# Patient Record
Sex: FEMALE | Race: WHITE | NOT HISPANIC OR LATINO | Employment: OTHER | URBAN - METROPOLITAN AREA
[De-identification: names, ages, dates, MRNs, and addresses within clinical notes are randomized per-mention and may not be internally consistent; named-entity substitution may affect disease eponyms.]

---

## 2017-01-10 DIAGNOSIS — M05.731 RHEUMATOID ARTHRITIS INVOLVING BOTH WRISTS WITH POSITIVE RHEUMATOID FACTOR: ICD-10-CM

## 2017-01-10 DIAGNOSIS — M05.732 RHEUMATOID ARTHRITIS INVOLVING BOTH WRISTS WITH POSITIVE RHEUMATOID FACTOR: ICD-10-CM

## 2017-01-10 RX ORDER — METHOTREXATE 2.5 MG/1
TABLET ORAL
Qty: 20 TABLET | Refills: 0 | Status: SHIPPED | OUTPATIENT
Start: 2017-01-10 | End: 2017-01-17

## 2017-01-16 ENCOUNTER — NURSE TRIAGE (OUTPATIENT)
Dept: ADMINISTRATIVE | Facility: CLINIC | Age: 72
End: 2017-01-16

## 2017-01-16 NOTE — TELEPHONE ENCOUNTER
Reason for Disposition   General information question, no triage required and triager able to answer question    Protocols used: ST INFORMATION ONLY CALL-A-AH    Patient has an appointment for lab work and with Dr. Garza but now patient has flu like symptoms.  Patient wants to cancel lab work and appointment, maybe reschedule.

## 2017-01-17 ENCOUNTER — OFFICE VISIT (OUTPATIENT)
Dept: RHEUMATOLOGY | Facility: CLINIC | Age: 72
End: 2017-01-17
Payer: MEDICARE

## 2017-01-17 ENCOUNTER — TELEPHONE (OUTPATIENT)
Dept: RHEUMATOLOGY | Facility: CLINIC | Age: 72
End: 2017-01-17

## 2017-01-17 ENCOUNTER — LAB VISIT (OUTPATIENT)
Dept: LAB | Facility: HOSPITAL | Age: 72
End: 2017-01-17
Attending: INTERNAL MEDICINE
Payer: MEDICARE

## 2017-01-17 VITALS
DIASTOLIC BLOOD PRESSURE: 69 MMHG | BODY MASS INDEX: 17.42 KG/M2 | HEART RATE: 90 BPM | WEIGHT: 98.31 LBS | SYSTOLIC BLOOD PRESSURE: 124 MMHG | HEIGHT: 63 IN

## 2017-01-17 DIAGNOSIS — M05.732 RHEUMATOID ARTHRITIS INVOLVING BOTH WRISTS WITH POSITIVE RHEUMATOID FACTOR: Primary | ICD-10-CM

## 2017-01-17 DIAGNOSIS — M05.731 RHEUMATOID ARTHRITIS INVOLVING BOTH WRISTS WITH POSITIVE RHEUMATOID FACTOR: Primary | ICD-10-CM

## 2017-01-17 DIAGNOSIS — M81.0 OSTEOPOROSIS: ICD-10-CM

## 2017-01-17 DIAGNOSIS — M05.732 RHEUMATOID ARTHRITIS INVOLVING BOTH WRISTS WITH POSITIVE RHEUMATOID FACTOR: ICD-10-CM

## 2017-01-17 DIAGNOSIS — M05.731 RHEUMATOID ARTHRITIS INVOLVING BOTH WRISTS WITH POSITIVE RHEUMATOID FACTOR: ICD-10-CM

## 2017-01-17 LAB
ALBUMIN SERPL BCP-MCNC: 3.1 G/DL
ALP SERPL-CCNC: 126 U/L
ALT SERPL W/O P-5'-P-CCNC: 11 U/L
ANION GAP SERPL CALC-SCNC: 8 MMOL/L
AST SERPL-CCNC: 42 U/L
BASOPHILS # BLD AUTO: 0.1 K/UL
BASOPHILS NFR BLD: 0.9 %
BILIRUB SERPL-MCNC: 0.3 MG/DL
BUN SERPL-MCNC: 11 MG/DL
CALCIUM SERPL-MCNC: 9.7 MG/DL
CHLORIDE SERPL-SCNC: 104 MMOL/L
CO2 SERPL-SCNC: 25 MMOL/L
CREAT SERPL-MCNC: 0.9 MG/DL
CRP SERPL-MCNC: 20.7 MG/L
DIFFERENTIAL METHOD: ABNORMAL
EOSINOPHIL # BLD AUTO: 0 K/UL
EOSINOPHIL NFR BLD: 0.5 %
ERYTHROCYTE [DISTWIDTH] IN BLOOD BY AUTOMATED COUNT: 14 %
ERYTHROCYTE [SEDIMENTATION RATE] IN BLOOD BY WESTERGREN METHOD: 85 MM/HR
EST. GFR  (AFRICAN AMERICAN): >60 ML/MIN/1.73 M^2
EST. GFR  (NON AFRICAN AMERICAN): >60 ML/MIN/1.73 M^2
GLUCOSE SERPL-MCNC: 118 MG/DL
HCT VFR BLD AUTO: 34.4 %
HGB BLD-MCNC: 11.4 G/DL
LYMPHOCYTES # BLD AUTO: 2 K/UL
LYMPHOCYTES NFR BLD: 29.7 %
MCH RBC QN AUTO: 31.2 PG
MCHC RBC AUTO-ENTMCNC: 33.1 %
MCV RBC AUTO: 94 FL
MONOCYTES # BLD AUTO: 0.8 K/UL
MONOCYTES NFR BLD: 12.1 %
NEUTROPHILS # BLD AUTO: 3.8 K/UL
NEUTROPHILS NFR BLD: 56.8 %
PLATELET # BLD AUTO: 412 K/UL
PMV BLD AUTO: 8.1 FL
POTASSIUM SERPL-SCNC: 4.6 MMOL/L
PROT SERPL-MCNC: 7 G/DL
RBC # BLD AUTO: 3.65 M/UL
SODIUM SERPL-SCNC: 137 MMOL/L
WBC # BLD AUTO: 6.7 K/UL

## 2017-01-17 PROCEDURE — 1157F ADVNC CARE PLAN IN RCRD: CPT | Mod: S$GLB,,, | Performed by: INTERNAL MEDICINE

## 2017-01-17 PROCEDURE — 99213 OFFICE O/P EST LOW 20 MIN: CPT | Mod: S$GLB,,, | Performed by: INTERNAL MEDICINE

## 2017-01-17 PROCEDURE — 36415 COLL VENOUS BLD VENIPUNCTURE: CPT

## 2017-01-17 PROCEDURE — 80053 COMPREHEN METABOLIC PANEL: CPT

## 2017-01-17 PROCEDURE — 85025 COMPLETE CBC W/AUTO DIFF WBC: CPT

## 2017-01-17 PROCEDURE — 86140 C-REACTIVE PROTEIN: CPT

## 2017-01-17 PROCEDURE — 99999 PR PBB SHADOW E&M-EST. PATIENT-LVL III: CPT | Mod: PBBFAC,,, | Performed by: INTERNAL MEDICINE

## 2017-01-17 PROCEDURE — 85651 RBC SED RATE NONAUTOMATED: CPT

## 2017-01-17 PROCEDURE — 1159F MED LIST DOCD IN RCRD: CPT | Mod: S$GLB,,, | Performed by: INTERNAL MEDICINE

## 2017-01-17 PROCEDURE — 1160F RVW MEDS BY RX/DR IN RCRD: CPT | Mod: S$GLB,,, | Performed by: INTERNAL MEDICINE

## 2017-01-17 RX ORDER — HYDROCODONE BITARTRATE AND ACETAMINOPHEN 5; 325 MG/1; MG/1
TABLET ORAL
Refills: 0 | COMMUNITY
Start: 2016-12-30 | End: 2017-01-27

## 2017-01-17 NOTE — TELEPHONE ENCOUNTER
I received a message that the patient wanted to cancel her follow up appt with Dr. Garza and her lab appointment. I attempted to call the pt to reschedule, but I did not get an answer nor was able to leave a voice message.

## 2017-01-17 NOTE — PROGRESS NOTES
"Subjective:       Patient ID: Aurelia Massey is a 71 y.o. female.    Chief Complaint: Seropositive rheumatoid arthritis  HPI 70 yo female with h/o Osteoporosis is here for follow-up for seropositive rheumatoid arthritis.  Currently on methotrexate 10 mg a week.  Since the last visit, she has been diagnosed with multiple hepatic masses -S/P biopsy -pending results of this time malignancy is being suspected.  Today, she doesn't have any pain in hands but does have mild pain in left shoulder.  No joint swelling.  Denies any chest pain, shortness of breath, palpitations.   Sister has RA  Review of Systems   Constitutional: Negative for fatigue and fever.   Eyes: Negative for pain and redness.   Respiratory: Negative for cough and shortness of breath.    Cardiovascular: Negative for chest pain and palpitations.   Gastrointestinal: Negative for abdominal distention and abdominal pain.   Musculoskeletal: Positive for arthralgias.   Neurological: Negative for tremors and seizures.   Psychiatric/Behavioral: Negative for agitation and hallucinations.         Objective:     Visit Vitals    /69 (BP Location: Left arm, Patient Position: Sitting, BP Method: Automatic)    Pulse 90    Ht 5' 3" (1.6 m)    Wt 44.6 kg (98 lb 5.2 oz)    BMI 17.42 kg/m2        Physical Exam   Constitutional: She is oriented to person, place, and time and well-developed, well-nourished, and in no distress.   HENT:   Head: Normocephalic and atraumatic.   Eyes: Conjunctivae and EOM are normal. Pupils are equal, round, and reactive to light.   Neck: Neck supple. No tracheal deviation present. No thyromegaly present.   Cardiovascular: Normal rate and regular rhythm.    Pulmonary/Chest: Effort normal and breath sounds normal.   Abdominal: Soft. Bowel sounds are normal.       Right Side Rheumatological Exam     Examination finds the shoulder, elbow, wrist, knee, 1st PIP, 1st MCP, 2nd PIP, 2nd MCP, 3rd PIP, 3rd MCP, 4th PIP, 4th MCP, 5th PIP " and 5th MCP normal.    Left Side Rheumatological Exam     Examination finds the elbow, wrist, knee, 1st PIP, 1st MCP, 2nd PIP, 2nd MCP, 3rd PIP, 3rd MCP, 4th PIP, 4th MCP, 5th PIP and 5th MCP normal.    The patient is tender to palpation of the shoulder.      Neurological: She is alert and oriented to person, place, and time.   Skin: Skin is warm and dry.     Psychiatric: Memory and affect normal.   Musculoskeletal: She exhibits no edema.                   Lab Results   Component Value Date    WBC 6.70 01/17/2017    HGB 11.4 (L) 01/17/2017    HCT 34.4 (L) 01/17/2017    MCV 94 01/17/2017     (H) 01/17/2017     CMP  Sodium   Date Value Ref Range Status   01/17/2017 137 136 - 145 mmol/L Final     Potassium   Date Value Ref Range Status   01/17/2017 4.6 3.5 - 5.1 mmol/L Final     Chloride   Date Value Ref Range Status   01/17/2017 104 95 - 110 mmol/L Final     CO2   Date Value Ref Range Status   01/17/2017 25 23 - 29 mmol/L Final     Glucose   Date Value Ref Range Status   01/17/2017 118 (H) 70 - 110 mg/dL Final     BUN, Bld   Date Value Ref Range Status   01/17/2017 11 8 - 23 mg/dL Final     Creatinine   Date Value Ref Range Status   01/17/2017 0.9 0.5 - 1.4 mg/dL Final     Calcium   Date Value Ref Range Status   01/17/2017 9.7 8.7 - 10.5 mg/dL Final     Total Protein   Date Value Ref Range Status   01/17/2017 7.0 6.0 - 8.4 g/dL Final     Albumin   Date Value Ref Range Status   01/17/2017 3.1 (L) 3.5 - 5.2 g/dL Final     Total Bilirubin   Date Value Ref Range Status   01/17/2017 0.3 0.1 - 1.0 mg/dL Final     Comment:     For infants and newborns, interpretation of results should be based  on gestational age, weight and in agreement with clinical  observations.  Premature Infant recommended reference ranges:  Up to 24 hours.............<8.0 mg/dL  Up to 48 hours............<12.0 mg/dL  3-5 days..................<15.0 mg/dL  6-29 days.................<15.0 mg/dL       Alkaline Phosphatase   Date Value Ref Range  Status   01/17/2017 126 55 - 135 U/L Final     AST   Date Value Ref Range Status   01/17/2017 42 (H) 10 - 40 U/L Final     ALT   Date Value Ref Range Status   01/17/2017 11 10 - 44 U/L Final     Anion Gap   Date Value Ref Range Status   01/17/2017 8 8 - 16 mmol/L Final     eGFR if    Date Value Ref Range Status   01/17/2017 >60 >60 mL/min/1.73 m^2 Final     eGFR if non    Date Value Ref Range Status   01/17/2017 >60 >60 mL/min/1.73 m^2 Final     Comment:     Calculation used to obtain the estimated glomerular filtration  rate (eGFR) is the CKD-EPI equation. Since race is unknown   in our information system, the eGFR values for   -American and Non--American patients are given   for each creatinine result.       Lab Results   Component Value Date    SEDRATE 85 (H) 01/17/2017     Lab Results   Component Value Date    CRP 20.7 (H) 01/17/2017   Results for JOSE ROLDAN (MRN [id number]) as of 12/13/2016 10:42   Ref. Range 10/31/2016 11:43   Anti-SSA Antibody Latest Ref Range: 0.00 - 19.99 EU 1.62   Anti-SSA Interpretation Latest Ref Range: Negative  Negative   CCP Antibodies Latest Ref Range: <5.0 U/mL 1084.6 (H)      Reviewed outside results from 9/1/2016  RF 54 ESR 38 KRISTIAN negative  Assessment:   Seropositive rheumatoid arthritis- TJC 1 SJC 0  Multiple hepatic and jd hepatic masses -S/P biopsy-pending results  Osteoporosis  ITP s/p splenectomy  GERD  Plan:   Hold methotrexate for now  Continue Norco for pain  On Fosamax for osteoporosis  Follow-up with oncology for further evaluation  Call if any worsening of joint pain or swelling  Return to clinic in 3 months

## 2017-01-17 NOTE — MR AVS SNAPSHOT
"    [facility]   [address]  [place] [address]  Phone: [phone]  Fax: [phone]                  Aurelia Massey   2017 1:00 PM   Office Visit    Description:  Female : 1945   Provider:  Melanie Garza MD   Department:  [facility]           Reason for Visit     Follow-up                To Do List           Goals (5 Years of Data)     None      [facility]     [facility] On Call Nurse Care Line -  Assistance  Registered nurses in the [facility] On Call Center provide clinical advisement, health education, appointment booking, and other advisory services.  Call for this free service at [phone].             Medications           Message regarding Medications     Verify the changes and/or additions to your medication regime listed below are the same as discussed with your clinician today.  If any of these changes or additions are incorrect, please notify your healthcare provider.             Verify that the below list of medications is an accurate representation of the medications you are currently taking.  If none reported, the list may be blank. If incorrect, please contact your healthcare provider. Carry this list with you in case of emergency.           Current Medications     alendronate (FOSAMAX) 70 MG tablet TAKE 1 TABLET ONCE WEEKLY    folic acid (FOLVITE) 1 MG tablet Take 1 tablet (1 mg total) by mouth once daily.    hydrocodone-acetaminophen 5-325mg (NORCO) 5-325 mg per tablet TAKE 1 TABLET BY MOUTH EVERY 6 HOURS AS NEEDED FOR SEVERE PAIN    methotrexate 2.5 MG Tab TAKE 4 TABLETS (10 MG TOTAL) BY MOUTH EVERY 7 DAYS.           Clinical Reference Information           Vital Signs - Last Recorded  Most recent update: 2017  2:34 PM by [place] Montana LPN    BP Pulse Ht Wt BMI    124/69 (BP Location: Left arm, Patient Position: Sitting, BP Method: Automatic) 90 5' 3" (1.6 m) 44.6 kg (98 lb 5.2 oz) 17.42 kg/m2      Blood Pressure       "    Most Recent Value    BP  124/69      Allergies as of 1/17/2017     Plaquenil [Hydroxychloroquine]      Immunizations Administered on Date of Encounter - 1/17/2017     None

## 2017-01-26 ENCOUNTER — HISTORICAL (OUTPATIENT)
Dept: ADMINISTRATIVE | Facility: HOSPITAL | Age: 72
End: 2017-01-26

## 2017-01-27 ENCOUNTER — HOSPITAL ENCOUNTER (EMERGENCY)
Facility: HOSPITAL | Age: 72
Discharge: HOME | End: 2017-01-27
Attending: EMERGENCY MEDICINE
Payer: MEDICARE

## 2017-01-27 VITALS
HEART RATE: 93 BPM | HEIGHT: 63 IN | WEIGHT: 102 LBS | RESPIRATION RATE: 18 BRPM | SYSTOLIC BLOOD PRESSURE: 124 MMHG | DIASTOLIC BLOOD PRESSURE: 66 MMHG | BODY MASS INDEX: 18.07 KG/M2 | OXYGEN SATURATION: 99 % | TEMPERATURE: 98 F

## 2017-01-27 DIAGNOSIS — C80.1 METASTATIC CARCINOMA INVOLVING LIVER WITH UNKNOWN PRIMARY SITE: Primary | ICD-10-CM

## 2017-01-27 DIAGNOSIS — R10.84 DIFFUSE ABDOMINAL PAIN: ICD-10-CM

## 2017-01-27 DIAGNOSIS — C78.7 METASTATIC CARCINOMA INVOLVING LIVER WITH UNKNOWN PRIMARY SITE: Primary | ICD-10-CM

## 2017-01-27 LAB
ALBUMIN SERPL BCP-MCNC: 3.1 G/DL
ALP SERPL-CCNC: 135 U/L
ALT SERPL W/O P-5'-P-CCNC: 15 U/L
ANION GAP SERPL CALC-SCNC: 11 MMOL/L
AST SERPL-CCNC: 51 U/L
BILIRUB SERPL-MCNC: 0.6 MG/DL
BILIRUB UR QL STRIP: ABNORMAL
BUN SERPL-MCNC: 11 MG/DL
CALCIUM SERPL-MCNC: 9.7 MG/DL
CHLORIDE SERPL-SCNC: 104 MMOL/L
CLARITY UR: ABNORMAL
CO2 SERPL-SCNC: 21 MMOL/L
COLOR UR: YELLOW
CREAT SERPL-MCNC: 0.8 MG/DL
EST. GFR  (AFRICAN AMERICAN): >60 ML/MIN/1.73 M^2
EST. GFR  (NON AFRICAN AMERICAN): >60 ML/MIN/1.73 M^2
GLUCOSE SERPL-MCNC: 83 MG/DL
GLUCOSE UR QL STRIP: NEGATIVE
HGB UR QL STRIP: NEGATIVE
KETONES UR QL STRIP: ABNORMAL
LACTATE SERPL-SCNC: 2 MMOL/L
LEUKOCYTE ESTERASE UR QL STRIP: NEGATIVE
LIPASE SERPL-CCNC: 39 U/L
NITRITE UR QL STRIP: NEGATIVE
PH UR STRIP: 7 [PH] (ref 5–8)
POTASSIUM SERPL-SCNC: 4.2 MMOL/L
PROT SERPL-MCNC: 7 G/DL
PROT UR QL STRIP: NEGATIVE
SODIUM SERPL-SCNC: 136 MMOL/L
SP GR UR STRIP: 1.02 (ref 1–1.03)
URN SPEC COLLECT METH UR: ABNORMAL
UROBILINOGEN UR STRIP-ACNC: 1 EU/DL

## 2017-01-27 PROCEDURE — 81003 URINALYSIS AUTO W/O SCOPE: CPT

## 2017-01-27 PROCEDURE — 25000003 PHARM REV CODE 250: Performed by: EMERGENCY MEDICINE

## 2017-01-27 PROCEDURE — 25500020 PHARM REV CODE 255

## 2017-01-27 PROCEDURE — 83605 ASSAY OF LACTIC ACID: CPT

## 2017-01-27 PROCEDURE — 36415 COLL VENOUS BLD VENIPUNCTURE: CPT

## 2017-01-27 PROCEDURE — 83690 ASSAY OF LIPASE: CPT

## 2017-01-27 PROCEDURE — 96374 THER/PROPH/DIAG INJ IV PUSH: CPT

## 2017-01-27 PROCEDURE — 80053 COMPREHEN METABOLIC PANEL: CPT

## 2017-01-27 PROCEDURE — 63600175 PHARM REV CODE 636 W HCPCS: Performed by: EMERGENCY MEDICINE

## 2017-01-27 PROCEDURE — 99284 EMERGENCY DEPT VISIT MOD MDM: CPT | Mod: 25

## 2017-01-27 RX ORDER — FENTANYL 25 UG/1
1 PATCH TRANSDERMAL
Status: DISCONTINUED | OUTPATIENT
Start: 2017-01-27 | End: 2017-01-27 | Stop reason: HOSPADM

## 2017-01-27 RX ORDER — OXYCODONE AND ACETAMINOPHEN 5; 325 MG/1; MG/1
1-2 TABLET ORAL EVERY 4 HOURS PRN
Qty: 30 TABLET | Refills: 0 | Status: SHIPPED | OUTPATIENT
Start: 2017-01-27 | End: 2017-07-21

## 2017-01-27 RX ORDER — MORPHINE SULFATE 10 MG/ML
5 INJECTION INTRAMUSCULAR; INTRAVENOUS; SUBCUTANEOUS
Status: COMPLETED | OUTPATIENT
Start: 2017-01-27 | End: 2017-01-27

## 2017-01-27 RX ADMIN — IOHEXOL 30 ML: 350 INJECTION, SOLUTION INTRAVENOUS at 09:01

## 2017-01-27 RX ADMIN — MORPHINE SULFATE 5 MG: 10 INJECTION INTRAVENOUS at 09:01

## 2017-01-27 RX ADMIN — FENTANYL 1 PATCH: 25 PATCH, EXTENDED RELEASE TRANSDERMAL at 12:01

## 2017-01-27 RX ADMIN — IOHEXOL 75 ML: 350 INJECTION, SOLUTION INTRAVENOUS at 09:01

## 2017-01-27 NOTE — DISCHARGE INSTRUCTIONS
Abdominal Pain    Abdominal pain is pain in the stomach or belly area. Everyone has this pain from time to time. In many cases it goes away on its own. But abdominal pain can sometimes be due to a serious problem, such as appendicitis. So its important to know when to seek help.  Causes of abdominal pain  There are many possible causes of abdominal pain. Common causes in adults include:  · Constipation, diarrhea, or gas  · Stomach acid flowing back up into the esophagus (acid reflux or heartburn)  · Severe acid reflux, called GERD (gastroesophageal reflux disease)  · A sore in the lining of the stomach or small intestine (peptic ulcer)  · Inflammation of the gallbladder, liver, or pancreas  · Gallstones or kidney stones  · Appendicitis   · Intestinal blockage   · An internal organ pushing through a muscle or other tissue (hernia)  · Urinary tract infections  · In women, menstrual cramps, fibroids, or endometriosis  · Inflammation or infection of the intestines  Diagnosing the cause of abdominal pain  Your healthcare provider will do a physical exam help find the cause of your pain. If needed, tests will be ordered. Belly pain has many possible causes. So it can be hard to find the reason for your pain. Giving details about your pain can help. Tell your provider where and when you feel the pain, and what makes it better or worse. Also let your provider know if you have other symptoms such as:  · Fever  · Tiredness  · Upset stomach (nausea)  · Vomiting  · Changes in bathroom habits  Treating abdominal pain  Some causes of pain need emergency medical treatment right away. These include appendicitis or a bowel blockage. Other problems can be treated with rest, fluids, or medicines. Your healthcare provider can give you specific instructions for treatment or self-care based on what is causing your pain.  If you have vomiting or diarrhea, sip water or other clear fluids. When you are ready to eat solid foods again,  start with small amounts of easy-to-digest, low-fat foods. These include apple sauce, toast, or crackers.   When to seek medical care  Call 911 or go to the hospital right away if you:  · Cant pass stool and are vomiting  · Are vomiting blood or have bloody diarrhea or black, tarry diarrhea  · Have chest, neck, or shoulder pain  · Feel like you might pass out  · Have pain in your shoulder blades with nausea  · Have sudden, severe belly pain  · Have new, severe pain unlike any you have felt before  · Have a belly that is rigid, hard, and tender to touch  Call your healthcare provider if you have:  · Pain for more than 5 days  · Bloating for more than 2 days  · Diarrhea for more than 5 days  · A fever of 100.4°F (38.0°C) or higher, or as directed by your provider  · Pain that gets worse  · Weight loss for no reason  · Continued lack of appetite  · Blood in your stool  How to prevent abdominal pain  Here are some tips to help prevent abdominal pain:  · Eat smaller amounts of food at one time.  · Avoid greasy, fried, or other high-fat foods.  · Avoid foods that give you gas.  · Exercise regularly.  · Drink plenty of fluids.  To help prevent GERD symptoms:  · Quit smoking.  · Reduce alcohol and certain foods that increase stomach acid.  · Avoid aspirin and over-the-counter pain and fever medicines (NSAIDS or nonsteroidal anti-inflammatory drugs), if possible  · Lose extra weight.  · Finish eating at least 2 hours before you go to bed or lie down.  · Raise the head of your bed.  © 6195-7384 [employer]. [address] [place] [address], [place] [address]. All rights reserved. This information is not intended as a substitute for professional medical care. Always follow your healthcare professional's instructions.

## 2017-01-27 NOTE — ED AVS SNAPSHOT
[facility] [address] [place] [address]               Aurelia Massey   2017  8:10 AM   ED    Description:  Female : 1945   Department:  [facility]           Your Care was Coordinated By:     Provider Role From To    Moy Garcia III, MD Attending Provider 17 0834 --      Reason for Visit     Abdominal Pain           Diagnoses this Visit        Comments    Metastatic carcinoma involving liver with unknown primary site    -  Primary     Diffuse abdominal pain           ED Disposition     None           To Do List           Follow-up Information     Follow up with Compa Lay Jr, MD In 3 days.    Specialty:  Family Medicine    Contact information:    [address] [place] [id number]  [phone]         These Medications        Disp Refills Start End    oxycodone-acetaminophen (PERCOCET) 5-325 mg per tablet 30 tablet 0 2017     Take 1-2 tablets by mouth every 4 (four) hours as needed for Pain. - Oral      [facility] On Call     [facility] On [facility] Nurse Care Line -  Assistance  Registered nurses in the [facility] On Call Center provide clinical advisement, health education, appointment booking, and other advisory services.  Call for this free service at [phone].             Medications           Message regarding Medications     Verify the changes and/or additions to your medication regime listed below are the same as discussed with your clinician today.  If any of these changes or additions are incorrect, please notify your healthcare provider.        START taking these NEW medications        Refills    oxycodone-acetaminophen (PERCOCET) 5-325 mg per tablet 0    Sig: Take 1-2 tablets by mouth every 4 (four) hours as needed for Pain.    Class: Print    Route: Oral      These medications were administered today        Dose Freq    morphine injection 5 mg 5 mg ED 1 Time    Sig: Inject 0.5 mLs (5 mg  "total) into the vein ED 1 Time.    Class: Normal    Route: Intravenous    omnipaque 350 iohexol 350 mg iodine/mL      Notes to Pharmacy: Created by cabinet override    omnipaque 350 iohexol 350 mg iodine/mL      Notes to Pharmacy: Created by cabinet override    fentaNYL 25 mcg/hr 1 patch 1 patch Every 72 hours    Sig: Place 1 patch onto the skin every 72 hours.    Class: Normal    Route: Transdermal      STOP taking these medications     folic acid (FOLVITE) 1 MG tablet Take 1 tablet (1 mg total) by mouth once daily.    alendronate (FOSAMAX) 70 MG tablet TAKE 1 TABLET ONCE WEEKLY    hydrocodone-acetaminophen 5-325mg (NORCO) 5-325 mg per tablet TAKE 1 TABLET BY MOUTH EVERY 6 HOURS AS NEEDED FOR SEVERE PAIN           Verify that the below list of medications is an accurate representation of the medications you are currently taking.  If none reported, the list may be blank. If incorrect, please contact your healthcare provider. Carry this list with you in case of emergency.           Current Medications     fentaNYL 25 mcg/hr 1 patch Place 1 patch onto the skin every 72 hours.    oxycodone-acetaminophen (PERCOCET) 5-325 mg per tablet Take 1-2 tablets by mouth every 4 (four) hours as needed for Pain.           Clinical Reference Information           Your Vitals Were     BP Pulse Temp Resp Height Weight    124/66 93 97.9 °F (36.6 °C) (Oral) 18 5' 3" (1.6 m) 46.3 kg (102 lb)    SpO2 BMI             99% 18.07 kg/m2         Allergies as of 1/27/2017        Reactions    Plaquenil [Hydroxychloroquine] Palpitations      Immunizations Administered on Date of Encounter - 1/27/2017     None      ED Micro, Lab, POCT     Start Ordered       Status Ordering Provider    01/27/17 0844 01/27/17 0843  Lipase  STAT      Final result     01/27/17 0844 01/27/17 0843  Lactic acid, plasma  STAT      Final result     01/27/17 0844 01/27/17 0843  Comprehensive metabolic panel  STAT      Final result     01/27/17 0844 01/27/17 0843  Urinalysis "  STAT      Final result       ED Imaging Orders     Start Ordered       Status Ordering Provider    01/27/17 0902 01/27/17 0902  CT Abdomen Pelvis With Contrast  1 time imaging      Final result         Discharge Instructions         Abdominal Pain    Abdominal pain is pain in the stomach or belly area. Everyone has this pain from time to time. In many cases it goes away on its own. But abdominal pain can sometimes be due to a serious problem, such as appendicitis. So its important to know when to seek help.  Causes of abdominal pain  There are many possible causes of abdominal pain. Common causes in adults include:  · Constipation, diarrhea, or gas  · Stomach acid flowing back up into the esophagus (acid reflux or heartburn)  · Severe acid reflux, called GERD (gastroesophageal reflux disease)  · A sore in the lining of the stomach or small intestine (peptic ulcer)  · Inflammation of the gallbladder, liver, or pancreas  · Gallstones or kidney stones  · Appendicitis   · Intestinal blockage   · An internal organ pushing through a muscle or other tissue (hernia)  · Urinary tract infections  · In women, menstrual cramps, fibroids, or endometriosis  · Inflammation or infection of the intestines  Diagnosing the cause of abdominal pain  Your healthcare provider will do a physical exam help find the cause of your pain. If needed, tests will be ordered. Belly pain has many possible causes. So it can be hard to find the reason for your pain. Giving details about your pain can help. Tell your provider where and when you feel the pain, and what makes it better or worse. Also let your provider know if you have other symptoms such as:  · Fever  · Tiredness  · Upset stomach (nausea)  · Vomiting  · Changes in bathroom habits  Treating abdominal pain  Some causes of pain need emergency medical treatment right away. These include appendicitis or a bowel blockage. Other problems can be treated with rest, fluids, or medicines. Your  healthcare provider can give you specific instructions for treatment or self-care based on what is causing your pain.  If you have vomiting or diarrhea, sip water or other clear fluids. When you are ready to eat solid foods again, start with small amounts of easy-to-digest, low-fat foods. These include apple sauce, toast, or crackers.   When to seek medical care  Call 911 or go to the hospital right away if you:  · Cant pass stool and are vomiting  · Are vomiting blood or have bloody diarrhea or black, tarry diarrhea  · Have chest, neck, or shoulder pain  · Feel like you might pass out  · Have pain in your shoulder blades with nausea  · Have sudden, severe belly pain  · Have new, severe pain unlike any you have felt before  · Have a belly that is rigid, hard, and tender to touch  Call your healthcare provider if you have:  · Pain for more than 5 days  · Bloating for more than 2 days  · Diarrhea for more than 5 days  · A fever of 100.4°F (38.0°C) or higher, or as directed by your provider  · Pain that gets worse  · Weight loss for no reason  · Continued lack of appetite  · Blood in your stool  How to prevent abdominal pain  Here are some tips to help prevent abdominal pain:  · Eat smaller amounts of food at one time.  · Avoid greasy, fried, or other high-fat foods.  · Avoid foods that give you gas.  · Exercise regularly.  · Drink plenty of fluids.  To help prevent GERD symptoms:  · Quit smoking.  · Reduce alcohol and certain foods that increase stomach acid.  · Avoid aspirin and over-the-counter pain and fever medicines (NSAIDS or nonsteroidal anti-inflammatory drugs), if possible  · Lose extra weight.  · Finish eating at least 2 hours before you go to bed or lie down.  · Raise the head of your bed.  © 1403-5656 [employer] [address] [place] [address], [place] [address]. All rights reserved. This information is not intended as a substitute for professional medical care. Always follow your healthcare  professional's instructions.           [facility] complies with applicable Federal civil rights laws and does not discriminate on the basis of race, color, national origin, age, disability, or sex.        Language Assistance Services     ATTENTION: Language assistance services are available, free of charge. Please call [phone].      ATENCIÓN: Si habla español, tiene a solano disposición servicios gratuitos de asistencia lingüística. Llame al [phone].     CHÚ Ý: N?u b?n nói Ti?ng Vi?t, có các d?ch v? h? tr? ngôn ng? mi?n phí dành cho b?n. G?i s? [phone].

## 2017-01-27 NOTE — ED PROVIDER NOTES
Encounter Date: 1/27/2017       History     Chief Complaint   Patient presents with    Abdominal Pain     mid. abd. pain started at 0300 / no N/V/D     Review of patient's allergies indicates:   Allergen Reactions    Plaquenil [hydroxychloroquine] Palpitations     HPI Comments: Chief complaint: Abdominal pain    History of present illness:Aurelia Massey is a 71 y.o. female who presents with  diffuse abdominal pain greatest on the right side.  She has approximately a 6 week history of abdominal pain and was recently diagnosed with metastatic hepatic carcinoma with unknown primary.  She reports that the pain became abruptly worse at approximately 3 AM and is located diffusely throughout the abdomen but greatest in the epigastrium and right side of the abdomen.  She has had no nausea, vomiting, diarrhea, melena, hematochezia or hematemesis.  She denies any dysuria and has had no fever.  He also has known history of cholelithiasis.    The history is provided by the patient.     Past Medical History   Diagnosis Date    Acute ITP     Cancer      No past medical history pertinent negatives.  Past Surgical History   Procedure Laterality Date    Splenectomy, total       Family History   Problem Relation Age of Onset    Rheum arthritis Sister     Cancer Mother     Cancer Father     Cancer Brother      Social History   Substance Use Topics    Smoking status: Never Smoker    Smokeless tobacco: None    Alcohol use No     Review of Systems   Constitutional: Negative for activity change, appetite change and fever.   HENT: Negative for voice change.    Eyes: Negative for visual disturbance.   Respiratory: Negative for apnea and shortness of breath.    Cardiovascular: Negative for chest pain.   Gastrointestinal: Positive for abdominal pain. Negative for vomiting.   Genitourinary: Negative for decreased urine volume.   Musculoskeletal: Negative for back pain and neck pain.   Skin: Negative for color change.    Neurological: Negative for weakness and headaches.   Hematological: Does not bruise/bleed easily.   Psychiatric/Behavioral: Negative for confusion.       Physical Exam   Initial Vitals   BP Pulse Resp Temp SpO2   01/27/17 0807 01/27/17 0807 01/27/17 0807 01/27/17 0807 01/27/17 0807   124/66 93 18 97.9 °F (36.6 °C) 99 %     Physical Exam    Nursing note and vitals reviewed.  Constitutional: She appears well-developed and well-nourished.   HENT:   Head: Normocephalic and atraumatic.   Eyes: Conjunctivae are normal.   Neck: Normal range of motion. Neck supple.   Cardiovascular: Normal rate.   Pulmonary/Chest: Effort normal and breath sounds normal. No respiratory distress.   Abdominal: Soft. She exhibits no distension. There is tenderness (Diffuse abdominal tenderness greaest on the side of the abdomen). There is no rebound and no guarding.   Musculoskeletal: Normal range of motion.   Neurological: She is alert and oriented to person, place, and time.   Skin: Skin is warm and dry. No erythema.   Psychiatric: She has a normal mood and affect.         ED Course   Procedures  Labs Reviewed   COMPREHENSIVE METABOLIC PANEL - Abnormal; Notable for the following:        Result Value    CO2 21 (*)     Albumin 3.1 (*)     AST 51 (*)     All other components within normal limits   URINALYSIS - Abnormal; Notable for the following:     Appearance, UA Hazy (*)     Ketones, UA 1+ (*)     Bilirubin (UA) 1+ (*)     All other components within normal limits   LIPASE   LACTIC ACID, PLASMA             Medical Decision Making:   History:   Old Records Summarized: records from previous admission(s).  Clinical Tests:   Lab Tests: Ordered and Reviewed  The following lab test(s) were unremarkable: CBC, CMP, Lipase, Lactate and Urinalysis  Radiological Study: Ordered and Reviewed  ED Management:  Aurelia Massey is a 71 y.o. female who presents with  worsening of her subacute abdominal pain which is predominantly upper abdominal pain.   I obtain a CT of the abdomen and pelvis to exclude bowel obstruction.  She has advanced been of her hepatic metastatic liver disease with compression of the hepatic vein.  She has no evidence of infection.  There is no evidence of venous thrombosis.  I discussed the case with Dr. Lamonte Whitehead who recommends fentanyl patch and oral oxycodone for pain control.  She is to begin chemotherapy in 3 days (Monday).  She is encouraged to return for worsening pain, persistent vomiting or fever.                   ED Course     Clinical Impression:   The primary encounter diagnosis was Metastatic carcinoma involving liver with unknown primary site. A diagnosis of Diffuse abdominal pain was also pertinent to this visit.          Moy Garcia III, MD  01/27/17 5962

## 2017-01-31 ENCOUNTER — HOSPITAL ENCOUNTER (INPATIENT)
Facility: HOSPITAL | Age: 72
LOS: 4 days | Discharge: HOME | DRG: 871 | End: 2017-02-04
Attending: EMERGENCY MEDICINE | Admitting: INTERNAL MEDICINE
Payer: MEDICARE

## 2017-01-31 DIAGNOSIS — M05.731 RHEUMATOID ARTHRITIS INVOLVING BOTH WRISTS WITH POSITIVE RHEUMATOID FACTOR: ICD-10-CM

## 2017-01-31 DIAGNOSIS — R65.21 SEPTIC SHOCK: ICD-10-CM

## 2017-01-31 DIAGNOSIS — J18.9 HCAP (HEALTHCARE-ASSOCIATED PNEUMONIA): Primary | ICD-10-CM

## 2017-01-31 DIAGNOSIS — R07.9 CHEST PAIN, UNSPECIFIED TYPE: ICD-10-CM

## 2017-01-31 DIAGNOSIS — A41.9 SEPTIC SHOCK: ICD-10-CM

## 2017-01-31 DIAGNOSIS — C22.1 CHOLANGIOCARCINOMA METASTATIC TO LIVER: ICD-10-CM

## 2017-01-31 DIAGNOSIS — M05.10 RHEUMATOID LUNG: ICD-10-CM

## 2017-01-31 DIAGNOSIS — C78.7 CHOLANGIOCARCINOMA METASTATIC TO LIVER: ICD-10-CM

## 2017-01-31 DIAGNOSIS — M05.732 RHEUMATOID ARTHRITIS INVOLVING BOTH WRISTS WITH POSITIVE RHEUMATOID FACTOR: ICD-10-CM

## 2017-01-31 DIAGNOSIS — R91.8 LUNG NODULES: ICD-10-CM

## 2017-01-31 DIAGNOSIS — Z90.81 H/O SPLENECTOMY: ICD-10-CM

## 2017-01-31 PROBLEM — R65.20 SEVERE SEPSIS: Status: ACTIVE | Noted: 2017-01-31

## 2017-01-31 LAB
ANION GAP SERPL CALC-SCNC: 14 MMOL/L
BASOPHILS NFR BLD: 0 %
BILIRUB UR QL STRIP: NEGATIVE
BUN SERPL-MCNC: 12 MG/DL
CALCIUM SERPL-MCNC: 9.7 MG/DL
CHLORIDE SERPL-SCNC: 101 MMOL/L
CLARITY UR: CLEAR
CO2 SERPL-SCNC: 19 MMOL/L
COLOR UR: YELLOW
CREAT SERPL-MCNC: 1 MG/DL
D DIMER PPP IA.FEU-MCNC: 3.04 MG/L FEU
DIFFERENTIAL METHOD: ABNORMAL
EOSINOPHIL NFR BLD: 0 %
ERYTHROCYTE [DISTWIDTH] IN BLOOD BY AUTOMATED COUNT: 14.2 %
EST. GFR  (AFRICAN AMERICAN): >60 ML/MIN/1.73 M^2
EST. GFR  (NON AFRICAN AMERICAN): 56 ML/MIN/1.73 M^2
GLUCOSE SERPL-MCNC: 86 MG/DL
GLUCOSE UR QL STRIP: NEGATIVE
HCT VFR BLD AUTO: 37.3 %
HGB BLD-MCNC: 12.1 G/DL
HGB UR QL STRIP: ABNORMAL
KETONES UR QL STRIP: ABNORMAL
LACTATE SERPL-SCNC: 1.3 MMOL/L
LACTATE SERPL-SCNC: 2.4 MMOL/L
LEUKOCYTE ESTERASE UR QL STRIP: NEGATIVE
LYMPHOCYTES NFR BLD: 6 %
MCH RBC QN AUTO: 30.6 PG
MCHC RBC AUTO-ENTMCNC: 32.5 %
MCV RBC AUTO: 94 FL
MONOCYTES NFR BLD: 0 %
NEUTROPHILS NFR BLD: 94 %
NITRITE UR QL STRIP: NEGATIVE
PH UR STRIP: 6 [PH] (ref 5–8)
PLATELET # BLD AUTO: 312 K/UL
PMV BLD AUTO: 8.6 FL
POTASSIUM SERPL-SCNC: 4.1 MMOL/L
PROT UR QL STRIP: ABNORMAL
RBC # BLD AUTO: 3.97 M/UL
SODIUM SERPL-SCNC: 134 MMOL/L
SP GR UR STRIP: 1.01 (ref 1–1.03)
TROPONIN I SERPL DL<=0.01 NG/ML-MCNC: 0.02 NG/ML
TROPONIN I SERPL DL<=0.01 NG/ML-MCNC: 0.02 NG/ML
URN SPEC COLLECT METH UR: ABNORMAL
UROBILINOGEN UR STRIP-ACNC: NEGATIVE EU/DL
WBC # BLD AUTO: 22.1 K/UL

## 2017-01-31 PROCEDURE — 96361 HYDRATE IV INFUSION ADD-ON: CPT

## 2017-01-31 PROCEDURE — 63600175 PHARM REV CODE 636 W HCPCS: Performed by: EMERGENCY MEDICINE

## 2017-01-31 PROCEDURE — 25500020 PHARM REV CODE 255

## 2017-01-31 PROCEDURE — 87040 BLOOD CULTURE FOR BACTERIA: CPT | Mod: 59

## 2017-01-31 PROCEDURE — 25000003 PHARM REV CODE 250: Performed by: NURSE PRACTITIONER

## 2017-01-31 PROCEDURE — 80048 BASIC METABOLIC PNL TOTAL CA: CPT

## 2017-01-31 PROCEDURE — 84484 ASSAY OF TROPONIN QUANT: CPT

## 2017-01-31 PROCEDURE — 99285 EMERGENCY DEPT VISIT HI MDM: CPT | Mod: 25

## 2017-01-31 PROCEDURE — 87086 URINE CULTURE/COLONY COUNT: CPT

## 2017-01-31 PROCEDURE — 96365 THER/PROPH/DIAG IV INF INIT: CPT

## 2017-01-31 PROCEDURE — 85379 FIBRIN DEGRADATION QUANT: CPT

## 2017-01-31 PROCEDURE — 25000003 PHARM REV CODE 250: Performed by: EMERGENCY MEDICINE

## 2017-01-31 PROCEDURE — 85027 COMPLETE CBC AUTOMATED: CPT

## 2017-01-31 PROCEDURE — 96375 TX/PRO/DX INJ NEW DRUG ADDON: CPT

## 2017-01-31 PROCEDURE — 83605 ASSAY OF LACTIC ACID: CPT

## 2017-01-31 PROCEDURE — 81003 URINALYSIS AUTO W/O SCOPE: CPT

## 2017-01-31 PROCEDURE — 85007 BL SMEAR W/DIFF WBC COUNT: CPT

## 2017-01-31 PROCEDURE — 36415 COLL VENOUS BLD VENIPUNCTURE: CPT

## 2017-01-31 PROCEDURE — 83605 ASSAY OF LACTIC ACID: CPT | Mod: 91

## 2017-01-31 PROCEDURE — 99223 1ST HOSP IP/OBS HIGH 75: CPT | Mod: ,,, | Performed by: INTERNAL MEDICINE

## 2017-01-31 PROCEDURE — 93005 ELECTROCARDIOGRAM TRACING: CPT

## 2017-01-31 PROCEDURE — 12000002 HC ACUTE/MED SURGE SEMI-PRIVATE ROOM

## 2017-01-31 RX ORDER — IBUPROFEN 600 MG/1
600 TABLET ORAL
Status: ACTIVE | OUTPATIENT
Start: 2017-01-31 | End: 2017-02-01

## 2017-01-31 RX ORDER — OXYCODONE AND ACETAMINOPHEN 5; 325 MG/1; MG/1
1 TABLET ORAL EVERY 4 HOURS PRN
Status: DISCONTINUED | OUTPATIENT
Start: 2017-02-01 | End: 2017-02-04 | Stop reason: HOSPADM

## 2017-01-31 RX ORDER — FOLIC ACID 1 MG/1
1 TABLET ORAL DAILY
COMMUNITY
End: 2017-01-31

## 2017-01-31 RX ORDER — ENOXAPARIN SODIUM 100 MG/ML
40 INJECTION SUBCUTANEOUS EVERY 24 HOURS
Status: DISCONTINUED | OUTPATIENT
Start: 2017-02-01 | End: 2017-02-04 | Stop reason: HOSPADM

## 2017-01-31 RX ORDER — ONDANSETRON 8 MG/1
4 TABLET, ORALLY DISINTEGRATING ORAL EVERY 6 HOURS PRN
COMMUNITY

## 2017-01-31 RX ORDER — MORPHINE SULFATE 2 MG/ML
4 INJECTION, SOLUTION INTRAMUSCULAR; INTRAVENOUS EVERY 4 HOURS PRN
Status: DISCONTINUED | OUTPATIENT
Start: 2017-01-31 | End: 2017-01-31

## 2017-01-31 RX ORDER — METHOTREXATE 2.5 MG/1
10 TABLET ORAL
COMMUNITY
End: 2017-01-31

## 2017-01-31 RX ORDER — MORPHINE SULFATE 2 MG/ML
4 INJECTION, SOLUTION INTRAMUSCULAR; INTRAVENOUS
Status: COMPLETED | OUTPATIENT
Start: 2017-01-31 | End: 2017-01-31

## 2017-01-31 RX ORDER — ONDANSETRON 2 MG/ML
4 INJECTION INTRAMUSCULAR; INTRAVENOUS EVERY 6 HOURS PRN
Status: DISCONTINUED | OUTPATIENT
Start: 2017-02-01 | End: 2017-02-04 | Stop reason: HOSPADM

## 2017-01-31 RX ORDER — MORPHINE SULFATE 4 MG/ML
4 INJECTION, SOLUTION INTRAMUSCULAR; INTRAVENOUS EVERY 4 HOURS PRN
Status: DISCONTINUED | OUTPATIENT
Start: 2017-01-31 | End: 2017-02-04 | Stop reason: HOSPADM

## 2017-01-31 RX ORDER — SODIUM CHLORIDE 9 MG/ML
INJECTION, SOLUTION INTRAVENOUS CONTINUOUS
Status: DISCONTINUED | OUTPATIENT
Start: 2017-01-31 | End: 2017-02-02

## 2017-01-31 RX ORDER — ONDANSETRON 2 MG/ML
4 INJECTION INTRAMUSCULAR; INTRAVENOUS EVERY 12 HOURS PRN
Status: DISCONTINUED | OUTPATIENT
Start: 2017-01-31 | End: 2017-01-31

## 2017-01-31 RX ORDER — ACETAMINOPHEN 325 MG/1
650 TABLET ORAL EVERY 6 HOURS PRN
Status: DISCONTINUED | OUTPATIENT
Start: 2017-01-31 | End: 2017-02-04 | Stop reason: HOSPADM

## 2017-01-31 RX ORDER — IPRATROPIUM BROMIDE AND ALBUTEROL SULFATE 2.5; .5 MG/3ML; MG/3ML
3 SOLUTION RESPIRATORY (INHALATION) EVERY 6 HOURS
Status: DISCONTINUED | OUTPATIENT
Start: 2017-02-01 | End: 2017-02-04 | Stop reason: HOSPADM

## 2017-01-31 RX ADMIN — MORPHINE SULFATE 4 MG: 2 INJECTION, SOLUTION INTRAMUSCULAR; INTRAVENOUS at 03:01

## 2017-01-31 RX ADMIN — SODIUM CHLORIDE 500 ML: 0.9 INJECTION, SOLUTION INTRAVENOUS at 10:01

## 2017-01-31 RX ADMIN — IOHEXOL 60 ML: 350 INJECTION, SOLUTION INTRAVENOUS at 04:01

## 2017-01-31 RX ADMIN — SODIUM CHLORIDE 1000 ML: 0.9 INJECTION, SOLUTION INTRAVENOUS at 03:01

## 2017-01-31 RX ADMIN — SODIUM CHLORIDE: 0.9 INJECTION, SOLUTION INTRAVENOUS at 09:01

## 2017-01-31 RX ADMIN — SODIUM CHLORIDE 1000 ML: 0.9 INJECTION, SOLUTION INTRAVENOUS at 06:01

## 2017-01-31 RX ADMIN — PIPERACILLIN SODIUM AND TAZOBACTAM SODIUM 4.5 G: 4; .5 INJECTION, POWDER, FOR SOLUTION INTRAVENOUS at 06:01

## 2017-01-31 RX ADMIN — ACETAMINOPHEN 650 MG: 325 TABLET, FILM COATED ORAL at 10:01

## 2017-01-31 RX ADMIN — VANCOMYCIN HYDROCHLORIDE 1000 MG: 1 INJECTION, POWDER, LYOPHILIZED, FOR SOLUTION INTRAVENOUS at 10:01

## 2017-01-31 NOTE — ED PROVIDER NOTES
"Encounter Date: 1/31/2017    SCRIBE #1 NOTE: I, Maryann Felder, am scribing for, and in the presence of, Dr. Campos.       History     Chief Complaint   Patient presents with    Chest Pain     s/s x 2 hr - denies nausea, SOB - reports "just had my first chemo tx (for liver CA)"      Review of patient's allergies indicates:   Allergen Reactions    Plaquenil [hydroxychloroquine] Palpitations     HPI Comments: 01/31/2017  2:18 PM     Chief Complaint: CP      The patient is a 71 y.o. 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 Pt described her CP as sharp. Her CP is exacerbated with breathing in. Pt took a pain pill, without any alleviation. No hx of similar symptoms. She also c/o chills. No rhinorrhea. No cough or SOB. No back pain. Her last chemotherapy treatment was 1 day ago. Pt denied radiation treatment. No PMHx of heart disease or blood clots. Pt has a past surgical history that includes Splenectomy, total.      The history is provided by the patient and the spouse.     Past Medical History   Diagnosis Date    Acute ITP     Cancer      liver first round chemo 1/30/2017      No past medical history pertinent negatives.  Past Surgical History   Procedure Laterality Date    Splenectomy, total       Family History   Problem Relation Age of Onset    Rheum arthritis Sister     Heart disease Sister      MI    Cancer Mother      leukemia    Heart disease Mother     Cancer Father      brain and lung    Cancer Brother      lung    Coronary artery disease Maternal Grandmother     Cancer Maternal Grandfather      lung     Social History   Substance Use Topics    Smoking status: Never Smoker    Smokeless tobacco: Never Used    Alcohol use No     Review of Systems   Constitutional: Positive for chills. Negative for fever.   HENT: Negative for rhinorrhea.    Eyes: " Negative for visual disturbance.   Respiratory: Negative for cough and shortness of breath.    Cardiovascular: Positive for chest pain.   Gastrointestinal: Negative for nausea.   Genitourinary: Negative for dysuria.   Musculoskeletal: Negative for back pain.   Skin: Negative for rash.   Neurological: Negative for weakness.   Hematological: Does not bruise/bleed easily.   Psychiatric/Behavioral: Negative for confusion.       Physical Exam   Initial Vitals   BP Pulse Resp Temp SpO2   01/31/17 1321 01/31/17 1321 01/31/17 1321 01/31/17 1321 01/31/17 1321   90/58 108 18 99.8 °F (37.7 °C) 96 %     Physical Exam    Nursing note and vitals reviewed.  Constitutional: She appears well-developed and well-nourished.   Pt has rigors on exam.   HENT:   Head: Normocephalic and atraumatic.   Mouth/Throat: Oropharynx is clear and moist.   Eyes: Conjunctivae and EOM are normal. Pupils are equal, round, and reactive to light.   Neck: Neck supple.   Cardiovascular: Normal rate, regular rhythm, normal heart sounds and intact distal pulses. Exam reveals no gallop and no friction rub.    No murmur heard.  Pulmonary/Chest: No accessory muscle usage. No tachypnea. She has rhonchi (Scattered rhonchi on right.).   Abdominal: Soft. She exhibits no distension. There is no tenderness.   Musculoskeletal:   Legs symmetric and non-tender.  No peripheral edema.   Neurological: She is alert and oriented to person, place, and time.   Skin: No rash noted. No erythema.   Psychiatric: She has a normal mood and affect.         ED Course   Procedures  Labs Reviewed   CBC W/ AUTO DIFFERENTIAL - Abnormal; Notable for the following:        Result Value    WBC 22.10 (*)     RBC 3.97 (*)     MPV 8.6 (*)     Gran% 94.0 (*)     Lymph% 6.0 (*)     Mono% 0.0 (*)     All other components within normal limits   BASIC METABOLIC PANEL - Abnormal; Notable for the following:     Sodium 134 (*)     CO2 19 (*)     eGFR if non  56 (*)     All other  components within normal limits   LACTIC ACID, PLASMA - Abnormal; Notable for the following:     Lactate (Lactic Acid) 2.4 (*)     All other components within normal limits   D DIMER, QUANTITATIVE - Abnormal; Notable for the following:     D-Dimer 3.04 (*)     All other components within normal limits   CULTURE, BLOOD   CULTURE, BLOOD   TROPONIN I   TROPONIN I                 Imaging Results         CTA Chest Non-Coronary (Final result) Result time:  01/31/17 17:09:29    Final result by Hilario Real Jr., MD (01/31/17 17:09:29)    Impression:     No CTA evidence of pulmonary embolus.  Small reticular nodular infiltrate in the right upper lobe laterally may represent an infectious process or neoplasm.  2 other soft tissue density nodules in the lung fields likely represent metastasis.  Large liver metastases are identified.  Prior granulomatous disease.      Electronically signed by: Hilario Real MD  Date:     01/31/17  Time:    17:09     Narrative:    Axial images are obtained through the chest during the  administration of IV contrast, 75 cc Omnipaque 350, and displayed at lung and soft tissue windows.  Multiplanar reformatted images were obtained through both pulmonary arterial trees and the mediastinum and aorta.     No pulmonary embolus is identified .     The cardiac size and contour is within normal limits.  Enlargement or aneurysm of the great vessels is not seen.  No dissection is seen in the aorta. Hemorrhage, adenopathy or soft tissue masses within the mediastinum are not identified.    Bilateral apical pleural scarring is noted.  Calcified granuloma in the right lower lung field and multiple calcified lymph nodes in the mediastinum and hilum are consistent with prior granulomatous disease.  A small reticular nodular infiltrate is identified in the right upper lobe laterally.  This could represent an infectious process or neoplasm in this patient with known liver metastases.  A 6 mm soft tissue  density nodule is noted in the right middle lobe as well as a 6 mm nodule adjacent to the heart and a 6 mm nodule in the left lingula.  No pneumothorax or pleural effusion is seen.    Large irregular liver masses are identified on the lower portion of the study consistent with neoplasm.  The spleen is absent.            X-Ray Chest AP Portable (Final result) Result time:  01/31/17 16:13:08    Procedure changed from X-Ray Chest PA And Lateral        Final result by Hilario Real Jr., MD (01/31/17 16:13:08)    Impression:     Negative portable chest.      Electronically signed by: Hilario Real MD  Date:     01/31/17  Time:    16:13     Narrative:    PORTABLE chest. at 1555     The mediastinal and cardiac size and contours are normal.  No intrapulmonary masses or infiltrates are seen. No pneumothorax is identified.            (radiology reading, CXR visualized by me)      The patient was informed of the incidental finding(s) as well as the need for PCP or specialist follow-up for reevaluation and possible further investigation or monitoring.       Scribe Attestation:   Scribe #1: I performed the above scribed service and the documentation accurately describes the services I performed. I attest to the accuracy of the note.    Attending Attestation:           Physician Attestation for Scribe:  Physician Attestation Statement for Scribe #1: I, Dr. Campos, reviewed documentation, as scribed by Maryann Felder in my presence, and it is both accurate and complete.         Aurelia Massey is a 71 y.o. female presenting with new onset of pleuritic chest pain with suspected healthcare associated pneumonia and possible lung metastases of known malignancy.  Infiltrate noted on CT.  No sign of PE.  Intermediate markers for sepsis with broad-spectrum antibodies initiated after cultures in the emergency department.  She does have low-grade temperature with leukocytosis also noted supporting possible infectious  cause.  No sign of respiratory distress here.  I do think she is appropriate for admission given multiple comorbidities, active chemotherapy, and possible infection.  2 L normal saline solution IV given in the emergency department.  I have discussed with Dr. Harris from the hospitalist service who will assume care.        ED Course   Comment By Time   EKG:  NSR, rate of 98, normal intervals and axis.  There are no acute ST or T wave changes suggestive of acute ischemia or infarction.   Cornelio Campos MD 01/31 8255     Clinical Impression:   The primary encounter diagnosis was Chest pain, unspecified type. A diagnosis of HCAP (healthcare-associated pneumonia) was also pertinent to this visit.          Cornelio Campos MD  01/31/17 8425

## 2017-01-31 NOTE — IP AVS SNAPSHOT
[address] [place] [address]  Phone: [phone]           Patient Discharge Instructions     Our goal is to set you up for success. This packet includes information on your condition, medications, and your home care. It will help you to care for yourself so you don't get sicker and need to go back to the hospital.     Please ask your nurse if you have any questions.        There are many details to remember when preparing to leave the hospital. Here is what you will need to do:    1. Take your medicine. If you are prescribed medications, review your Medication List in the following pages. You may have new medications to  at the pharmacy and others that you'll need to stop taking. Review the instructions for how and when to take your medications. Talk with your doctor or nurses if you are unsure of what to do.     2. Go to your follow-up appointments. Specific follow-up information is listed in the following pages. Your may be contacted by a transition nurse or clinical provider about future appointments. Be sure we have all of the phone numbers to reach you, if needed. Please contact your provider's office if you are unable to make an appointment.     3. Watch for warning signs. Your doctor or nurse will give you detailed warning signs to watch for and when to call for assistance. These instructions may also include educational information about your condition. If you experience any of warning signs to your health, call your doctor.               ** Verify the list of medication(s) below is accurate and up to date. Carry this with you in case of emergency. If your medications have changed, please notify your healthcare provider.             Medication List      START taking these medications        Additional Info                      levoFLOXacin 500 MG tablet   Commonly known as:  LEVAQUIN   Quantity:  7 tablet   Refills:  0   Dose:  500 mg    Start Date:   2/5/2017   Instructions:  Take 1 tablet (500 mg total) by mouth once daily.     Begin Date    AM    Noon    PM    Bedtime         CONTINUE taking these medications        Additional Info                      ondansetron 8 MG Tbdl   Commonly known as:  ZOFRAN-ODT   Refills:  0   Dose:  4 mg    Instructions:  Take 4 mg by mouth every 6 (six) hours as needed (nausea).     Begin Date    AM    Noon    PM    Bedtime       oxycodone-acetaminophen 5-325 mg per tablet   Commonly known as:  PERCOCET   Quantity:  30 tablet   Refills:  0   Dose:  1-2 tablet    Instructions:  Take 1-2 tablets by mouth every 4 (four) hours as needed for Pain.     Begin Date    AM    Noon    PM    Bedtime            Where to Get Your Medications      These medications were sent to [facility]/pharmacy #[id number] - [place], [place] - [address] AT [facility]  [address], [place] [address]     Phone:  [phone]     levoFLOXacin 500 MG tablet                  Please bring to all follow up appointments:    1. A copy of your discharge instructions.  2. All medicines you are currently taking in their original bottles.  3. Identification and insurance card.    Please arrive 15 minutes ahead of scheduled appointment time.    Please call 24 hours in advance if you must reschedule your appointment and/or time.        Follow-up Information     Follow up with SADIA Whitehead MD In 3 days.    Specialty:  Hematology and Oncology    Contact information:    [address] [facility]  [address]  [facility] [address]  [phone]          Discharge Instructions     Future Orders    Activity as tolerated     Call MD for:  difficulty breathing or increased cough     Call MD for:  increased confusion or weakness     Diet general     Questions:    Total calories:      Fat restriction, if any:      Protein restriction, if any:      Na restriction, if any:      Fluid restriction:      Additional restrictions:          Primary Diagnosis     Your primary diagnosis was:   "Healthcare-Associated Pneumonia      Admission Information     Date & Time Provider Department CSN    1/31/2017  1:23 PM Epi Harris MD [facility] [id number]      Care Providers     Provider Role Specialty Primary office phone    Epi Harris MD Attending Provider Internal Medicine [phone]    SADIA Whitehead MD Consulting Physician  Hematology and Oncology [phone]    Gilson Aburto MD Consulting Physician  Pulmonary Disease [phone]      Your Vitals Were     BP Pulse Temp Resp Height Weight    130/77 (BP Location: Right arm) 86 99 °F (37.2 °C) (Oral) 18 5' 3" (1.6 m) 49.4 kg (108 lb 14.5 oz)    SpO2 BMI             99% 19.29 kg/m2         Recent Lab Values     No lab values to display.      Pending Labs     Order Current Status    Blood culture Preliminary result    Blood culture Preliminary result    Culture, Respiratory with Gram Stain Preliminary result      Allergies as of 2/4/2017        Reactions    Plaquenil [Hydroxychloroquine] Palpitations      [facility]     [facility] Nurse Care Line - 24/7 Assistance  Unless otherwise directed by your provider, please contact [facility], our nurse care line that is available for 24/7 assistance.     Registered nurses in the [facility] provide clinical advisement, health education, appointment booking, and other advisory services.  Call for this free service at [phone].        Advance Directives     An advance directive is a document which, in the event you are no longer able to make decisions for yourself, tells your healthcare team what kind of treatment you do or do not want to receive, or who you would like to make those decisions for you.  If you do not currently have an advance directive, [facility] encourages you to create one.  For more information call:  [phone]), [phone] ([phone]),  or log on to [email or web address].        Language Assistance Services     ATTENTION: " Language assistance services are available, free of charge. Please call [phone].      ATENCIÓN: Si habla español, tiene a solano disposición servicios gratuitos de asistencia lingüística. Llame al [phone].     CHÚ Ý: N?u b?n nói Ti?ng Vi?t, có các d?ch v? h? tr? ngôn ng? mi?n phí dành cho b?n. G?i s? [phone].        Pneumonmia Discharge Instructions                 [facility] complies with applicable Federal civil rights laws and does not discriminate on the basis of race, color, national origin, age, disability, or sex.

## 2017-02-01 PROBLEM — R91.8 LUNG NODULES: Status: ACTIVE | Noted: 2017-02-01

## 2017-02-01 PROBLEM — R65.21 SEPTIC SHOCK: Status: ACTIVE | Noted: 2017-01-31

## 2017-02-01 PROBLEM — M05.10 RHEUMATOID LUNG: Status: ACTIVE | Noted: 2017-02-01

## 2017-02-01 LAB
ALBUMIN SERPL BCP-MCNC: 2.3 G/DL
ALP SERPL-CCNC: 96 U/L
ALT SERPL W/O P-5'-P-CCNC: 14 U/L
ANION GAP SERPL CALC-SCNC: 10 MMOL/L
AST SERPL-CCNC: 80 U/L
BASOPHILS # BLD AUTO: 0.1 K/UL
BASOPHILS NFR BLD: 0.4 %
BILIRUB DIRECT SERPL-MCNC: 0.4 MG/DL
BILIRUB SERPL-MCNC: 0.8 MG/DL
BUN SERPL-MCNC: 10 MG/DL
CALCIUM SERPL-MCNC: 8.6 MG/DL
CHLORIDE SERPL-SCNC: 107 MMOL/L
CO2 SERPL-SCNC: 20 MMOL/L
CREAT SERPL-MCNC: 0.8 MG/DL
DIFFERENTIAL METHOD: ABNORMAL
EOSINOPHIL # BLD AUTO: 0 K/UL
EOSINOPHIL NFR BLD: 0 %
ERYTHROCYTE [DISTWIDTH] IN BLOOD BY AUTOMATED COUNT: 13.9 %
EST. GFR  (AFRICAN AMERICAN): >60 ML/MIN/1.73 M^2
EST. GFR  (NON AFRICAN AMERICAN): >60 ML/MIN/1.73 M^2
GLUCOSE SERPL-MCNC: 98 MG/DL
HCT VFR BLD AUTO: 30.5 %
HGB BLD-MCNC: 10 G/DL
INR PPP: 1.2
LYMPHOCYTES # BLD AUTO: 1.3 K/UL
LYMPHOCYTES NFR BLD: 6.9 %
MCH RBC QN AUTO: 31 PG
MCHC RBC AUTO-ENTMCNC: 32.8 %
MCV RBC AUTO: 95 FL
MONOCYTES # BLD AUTO: 0.2 K/UL
MONOCYTES NFR BLD: 1.1 %
NEUTROPHILS # BLD AUTO: 17.3 K/UL
NEUTROPHILS NFR BLD: 91.6 %
PLATELET # BLD AUTO: 253 K/UL
PMV BLD AUTO: 9.5 FL
POTASSIUM SERPL-SCNC: 3.8 MMOL/L
PROT SERPL-MCNC: 5.7 G/DL
PROTHROMBIN TIME: 12.7 SEC
RBC # BLD AUTO: 3.23 M/UL
SODIUM SERPL-SCNC: 137 MMOL/L
TROPONIN I SERPL DL<=0.01 NG/ML-MCNC: 0.01 NG/ML
WBC # BLD AUTO: 18.9 K/UL

## 2017-02-01 PROCEDURE — 63600175 PHARM REV CODE 636 W HCPCS: Performed by: INTERNAL MEDICINE

## 2017-02-01 PROCEDURE — 63600175 PHARM REV CODE 636 W HCPCS: Performed by: EMERGENCY MEDICINE

## 2017-02-01 PROCEDURE — 25000003 PHARM REV CODE 250: Performed by: EMERGENCY MEDICINE

## 2017-02-01 PROCEDURE — 63600175 PHARM REV CODE 636 W HCPCS: Performed by: NURSE PRACTITIONER

## 2017-02-01 PROCEDURE — 80076 HEPATIC FUNCTION PANEL: CPT

## 2017-02-01 PROCEDURE — 80048 BASIC METABOLIC PNL TOTAL CA: CPT

## 2017-02-01 PROCEDURE — 20000000 HC ICU ROOM

## 2017-02-01 PROCEDURE — 99223 1ST HOSP IP/OBS HIGH 75: CPT | Mod: ,,, | Performed by: INTERNAL MEDICINE

## 2017-02-01 PROCEDURE — 94761 N-INVAS EAR/PLS OXIMETRY MLT: CPT

## 2017-02-01 PROCEDURE — 94640 AIRWAY INHALATION TREATMENT: CPT

## 2017-02-01 PROCEDURE — 25000242 PHARM REV CODE 250 ALT 637 W/ HCPCS: Performed by: NURSE PRACTITIONER

## 2017-02-01 PROCEDURE — 85610 PROTHROMBIN TIME: CPT

## 2017-02-01 PROCEDURE — 36415 COLL VENOUS BLD VENIPUNCTURE: CPT

## 2017-02-01 PROCEDURE — 84484 ASSAY OF TROPONIN QUANT: CPT

## 2017-02-01 PROCEDURE — 25000003 PHARM REV CODE 250: Performed by: INTERNAL MEDICINE

## 2017-02-01 PROCEDURE — 25000003 PHARM REV CODE 250: Performed by: NURSE PRACTITIONER

## 2017-02-01 PROCEDURE — 99233 SBSQ HOSP IP/OBS HIGH 50: CPT | Mod: ,,, | Performed by: INTERNAL MEDICINE

## 2017-02-01 PROCEDURE — 85025 COMPLETE CBC W/AUTO DIFF WBC: CPT

## 2017-02-01 RX ORDER — NOREPINEPHRINE BITARTRATE/D5W 8 MG/250ML
0.04 PLASTIC BAG, INJECTION (ML) INTRAVENOUS CONTINUOUS
Status: DISCONTINUED | OUTPATIENT
Start: 2017-02-01 | End: 2017-02-02

## 2017-02-01 RX ORDER — MOXIFLOXACIN HYDROCHLORIDE 400 MG/250ML
400 INJECTION, SOLUTION INTRAVENOUS
Status: DISCONTINUED | OUTPATIENT
Start: 2017-02-01 | End: 2017-02-04 | Stop reason: HOSPADM

## 2017-02-01 RX ORDER — PANTOPRAZOLE SODIUM 40 MG/1
40 TABLET, DELAYED RELEASE ORAL DAILY
Status: DISCONTINUED | OUTPATIENT
Start: 2017-02-01 | End: 2017-02-04 | Stop reason: HOSPADM

## 2017-02-01 RX ADMIN — ENOXAPARIN SODIUM 40 MG: 100 INJECTION SUBCUTANEOUS at 11:02

## 2017-02-01 RX ADMIN — PIPERACILLIN SODIUM AND TAZOBACTAM SODIUM 4.5 G: 4; .5 INJECTION, POWDER, FOR SOLUTION INTRAVENOUS at 11:02

## 2017-02-01 RX ADMIN — SODIUM CHLORIDE 1000 ML: 0.9 INJECTION, SOLUTION INTRAVENOUS at 12:02

## 2017-02-01 RX ADMIN — MORPHINE SULFATE 4 MG: 4 INJECTION INTRAVENOUS at 09:02

## 2017-02-01 RX ADMIN — IPRATROPIUM BROMIDE AND ALBUTEROL SULFATE 3 ML: .5; 3 SOLUTION RESPIRATORY (INHALATION) at 07:02

## 2017-02-01 RX ADMIN — PIPERACILLIN SODIUM AND TAZOBACTAM SODIUM 4.5 G: 4; .5 INJECTION, POWDER, FOR SOLUTION INTRAVENOUS at 03:02

## 2017-02-01 RX ADMIN — MOXIFLOXACIN HYDROCHLORIDE 400 MG: 400 INJECTION, SOLUTION INTRAVENOUS at 03:02

## 2017-02-01 RX ADMIN — IPRATROPIUM BROMIDE AND ALBUTEROL SULFATE 3 ML: .5; 3 SOLUTION RESPIRATORY (INHALATION) at 01:02

## 2017-02-01 RX ADMIN — VANCOMYCIN HYDROCHLORIDE 750 MG: 750 INJECTION, POWDER, LYOPHILIZED, FOR SOLUTION INTRAVENOUS at 10:02

## 2017-02-01 RX ADMIN — ONDANSETRON 4 MG: 2 INJECTION INTRAMUSCULAR; INTRAVENOUS at 05:02

## 2017-02-01 RX ADMIN — PANTOPRAZOLE SODIUM 40 MG: 40 TABLET, DELAYED RELEASE ORAL at 11:02

## 2017-02-01 RX ADMIN — SODIUM CHLORIDE: 0.9 INJECTION, SOLUTION INTRAVENOUS at 11:02

## 2017-02-01 RX ADMIN — PIPERACILLIN SODIUM AND TAZOBACTAM SODIUM 4.5 G: 4; .5 INJECTION, POWDER, FOR SOLUTION INTRAVENOUS at 09:02

## 2017-02-01 RX ADMIN — IPRATROPIUM BROMIDE AND ALBUTEROL SULFATE 3 ML: .5; 3 SOLUTION RESPIRATORY (INHALATION) at 08:02

## 2017-02-01 RX ADMIN — ONDANSETRON 4 MG: 2 INJECTION INTRAMUSCULAR; INTRAVENOUS at 07:02

## 2017-02-01 NOTE — ASSESSMENT & PLAN NOTE
CTA chest indicative of nodular infiltrate in RUL possibly infectious vs. Neoplasm.  Given immunocompromised state s/p splenectomy and recent chemotherapy-- will treat patient with IV Vanc & Zosyn.  Consult pulmonologist for evaluation.  Supplemental O2, nebulizer therapy.  Check sputum culture.

## 2017-02-01 NOTE — ED NOTES
Report called by day shift with patient transported to floor by stretcher. To room. No request or concern by family or patient.

## 2017-02-01 NOTE — PROGRESS NOTES
"Progress Note  Hospital Medicine  Patient Name:Aurelia Massey  MRN:  [id number]  Patient Class: IP- Inpatient  Admit Date: 1/31/2017  Length of Stay: 1 days  Expected Discharge Date:   Attending Physician: Epi Harris MD  Primary Care Provider:  SADIA Whitehead MD    SUBJECTIVE:     Principal Problem: HCAP (healthcare-associated pneumonia)  Initial history of present illness:Aurelia Massey is a 71 y.o. 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 She was admitted to the service of hospital medicine with HCAP and severe sepsis. She reported to ED with complaint of sudden onset sternal CP that began approximately 1 hour PTA. She states the pain was a pressure-like, severe pain "unlike anything I've ever had" with radiation to the upper RIGHT chest wall and was accompanied by mild SOB. She reported the pain worsened with inspiration, did not respond to oral home pain medication, and was eventually alleviated with morphine in the ED. She denies any cough, does endorse fever and chills. She had her first chemotherapy treatment Monday and is under the care of Dr. Lamonte Whitehead. She called his office prior to coming to ED and was told to report for emergency evaluation. She denies any nausea, vomiting, or abdominal pain at present.     PMH/PSH/SH/FH/Meds: reviewed.    Symptoms/Review of Systems: Off pressors. Weak. Denied complaints. No shortness of breath, cough, chest pain or headache, fever or abdominal pain.     Diet:  Adequate intake.    Activity level: Normal.    Pain:  Patient reports no pain.       OBJECTIVE:   Vital Signs (Most Recent):      Temp: 99.7 °F (37.6 °C) (02/01/17 0730)  Pulse: 93 (02/01/17 1000)  Resp: (!) 21 (02/01/17 1000)  BP: (!) 105/59 (02/01/17 1000)  SpO2: 95 % (02/01/17 1000)       Vital Signs Range (Last 24H):  Temp:  [98.8 °F (37.1 °C)-100.1 °F (37.8 °C)]   Pulse:  []   Resp:  " [18-26]   BP: ()/(41-66)   SpO2:  [94 %-100 %]     Weight: 45.4 kg (100 lb)  Body mass index is 17.71 kg/(m^2).    Intake/Output Summary (Last 24 hours) at 02/01/17 1020  Last data filed at 02/01/17 0930   Gross per 24 hour   Intake             1895 ml   Output                0 ml   Net             1895 ml     Physical Examination:  Constitutional: She is oriented to person, place, and time. She appears well-developed and well-nourished. No distress.   HENT:   Head: Normocephalic and atraumatic.   Eyes: Conjunctivae and EOM are normal. Pupils are equal, round, and reactive to light. Right eye exhibits no discharge. Left eye exhibits no discharge.   Neck: Normal range of motion. Neck supple. No thyromegaly present.   Cardiovascular: Regular rhythm, normal heart sounds and intact distal pulses.   Tachycardiac--103   Pulmonary/Chest: She has no wheezes. She exhibits no tenderness.   Appears dyspneic.  Breath sounds slightly diminished on RIGHT with faint rhonchi heard on auscultation.   Abdominal: Soft. Bowel sounds are normal. She exhibits no distension. There is no tenderness. There is no guarding.   Musculoskeletal: Normal range of motion. She exhibits no edema or tenderness.   Neurological: She is alert and oriented to person, place, and time.   Skin: Skin is warm and dry. No erythema. No pallor.   Psychiatric: She has a normal mood and affect. Her behavior is normal. Judgment and thought content normal.     CBC:    Recent Labs  Lab 01/31/17  1507 02/01/17  0439   WBC 22.10* 18.90*   RBC 3.97* 3.23*   HGB 12.1 10.0*   HCT 37.3 30.5*    253   MCV 94 95   MCH 30.6 31.0   MCHC 32.5 32.8   BMP    Recent Labs  Lab 01/27/17  0850 01/31/17  1506 02/01/17  0439   GLU 83 86 98    134* 137   K 4.2 4.1 3.8    101 107   CO2 21* 19* 20*   BUN 11 12 10   CREATININE 0.8 1.0 0.8   CALCIUM 9.7 9.7 8.6*      Diagnostic Results:  Microbiology Results (last 7 days)     Procedure Component Value Units Date/Time     Urine culture [[id number]] Collected:  01/31/17 2235    Order Status:  Sent Specimen:  Urine from Urine, Clean Catch Updated:  02/01/17 1013    Blood culture [[id number]] Collected:  01/31/17 1753    Order Status:  Completed Specimen:  Blood Updated:  02/01/17 0345     Blood Culture, Routine No Growth to date    Blood culture [[id number]] Collected:  01/31/17 1759    Order Status:  Completed Specimen:  Blood Updated:  02/01/17 0345     Blood Culture, Routine No Growth to date    Culture, Respiratory with Gram Stain [[id number]]     Order Status:  No result Specimen:  Respiratory          Significant Imaging: CTA chest: No CTA evidence of pulmonary embolus.  Small reticular nodular infiltrate in the right upper lobe laterally may represent an infectious process or neoplasm.  2 other soft tissue density nodules in the lung fields likely represent metastasis.  Large liver metastases are identified.  Prior granulomatous disease.      CXR: The mediastinal and cardiac size and contours are normal.  No intrapulmonary masses or infiltrates are seen. No pneumothorax is identified.     Assessment/Plan:     HCAP (healthcare-associated pneumonia)  CTA chest indicative of nodular infiltrate in RUL possibly infectious vs. Neoplasm. Given immunocompromised state s/p splenectomy and recent chemotherapy-- will treat patient with IV Vanc & Zosyn. Consult pulmonologist for evaluation. Supplemental O2, nebulizer therapy. Check sputum culture.  Add avelox.      Septic shock  Wean IV pressors as tolerated.   Continue IVF hydration.  Vanc/Zosyn/Avelox for HCAP as above. Follow blood cultures. Lactate has normalized.        Chest pain  Likely related to PNA. Will trend troponin, monitor on telemetry.        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 Consulted Dr. Lamonte Samaniego for inpatient evaluation and management. 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    VTE Risk Mitigation         Ordered     enoxaparin injection 40 mg  Daily     Route:  Subcutaneous        01/31/17 2038        Epi Harris MD  Department of Hospital Medicine   [facility]

## 2017-02-01 NOTE — H&P
"[facility] Hospital Medicine  History & Physical    Patient Name: Aurelia Massey  MRN: [id number]  Admission Date: 1/31/2017  Attending Physician: Epi Harris MD   Primary Care Provider: SADIA Whitehead MD         Patient information was obtained from patient, spouse/SO and ER records.     Subjective:     Principal Problem:HCAP (healthcare-associated pneumonia)    Chief Complaint:  Chest pain     HPI: Aurelia Massey is a 71 y.o. 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  She was admitted to the service of hospital medicine with HCAP and severe sepsis.  She reported to ED with complaint of sudden onset sternal CP that began approximately 1 hour PTA.  She states the pain was a pressure-like, severe pain "unlike anything I've ever had" with radiation to the upper RIGHT chest wall and was accompanied by mild SOB.  She reported the pain worsened with inspiration, did not respond to oral home pain medication, and was eventually alleviated with morphine in the ED.  She denies any cough, does endorse fever and chills.  She had her first chemotherapy treatment Monday and is under the care of Dr. Lamonte Whitehead.  She called his office prior to coming to ED and was told to report for emergency evaluation.  She denies any nausea, vomiting, or abdominal pain at present.  Other pertinent medical history as below:    Past Medical History   Diagnosis Date    Acute ITP     Cancer      liver first round chemo 1/30/2017        Past Surgical History   Procedure Laterality Date    Splenectomy, total         Review of patient's allergies indicates:   Allergen Reactions    Plaquenil [hydroxychloroquine] Palpitations       No current facility-administered medications on file prior to encounter.      Current Outpatient Prescriptions on File Prior to Encounter   Medication Sig    oxycodone-acetaminophen " (PERCOCET) 5-325 mg per tablet Take 1-2 tablets by mouth every 4 (four) hours as needed for Pain.     Family History     Problem Relation (Age of Onset)    Cancer Mother, Father, Brother, Maternal Grandfather    Coronary artery disease Maternal Grandmother    Heart disease Sister, Mother    Rheum arthritis Sister        Social History Main Topics    Smoking status: Never Smoker    Smokeless tobacco: Never Used    Alcohol use No    Drug use: No    Sexual activity: Not on file     Review of Systems   Constitutional: Positive for chills, fatigue and fever. Negative for activity change and appetite change.   HENT: Negative for congestion, postnasal drip, sinus pressure, sore throat and trouble swallowing.    Eyes: Negative for photophobia and visual disturbance.   Respiratory: Positive for chest tightness and shortness of breath. Negative for cough and wheezing.    Cardiovascular: Positive for chest pain. Negative for palpitations and leg swelling.   Gastrointestinal: Negative for abdominal distention, abdominal pain, blood in stool, constipation, diarrhea, nausea and vomiting.   Genitourinary: Negative for difficulty urinating, dysuria, flank pain, frequency and hematuria.   Musculoskeletal: Positive for arthralgias. Negative for back pain, myalgias, neck pain and neck stiffness.   Skin: Negative for color change.   Allergic/Immunologic: Positive for immunocompromised state.   Neurological: Positive for headaches (HA earlier today). Negative for dizziness, weakness and light-headedness.   Hematological: Does not bruise/bleed easily.   Psychiatric/Behavioral: Negative for agitation and confusion. The patient is not nervous/anxious.      Objective:     Vital Signs (Most Recent):  Temp: 100.1 °F (37.8 °C) (01/31/17 2216)  Pulse: 106 (01/31/17 1948)  Resp: 18 (01/31/17 1927)  BP: (!) 105/55 (01/31/17 1948)  SpO2: 96 % (01/31/17 1948) Vital Signs (24h Range):  Temp:  [99.8 °F (37.7 °C)-100.1 °F (37.8 °C)] 100.1 °F  (37.8 °C)  Pulse:  [] 106  Resp:  [18] 18  SpO2:  [94 %-100 %] 96 %  BP: ()/(55-66) 105/55     Weight: 45.4 kg (100 lb)  Body mass index is 17.71 kg/(m^2).    Physical Exam   Constitutional: She is oriented to person, place, and time. She appears well-developed and well-nourished. No distress.   HENT:   Head: Normocephalic and atraumatic.   Eyes: Conjunctivae and EOM are normal. Pupils are equal, round, and reactive to light. Right eye exhibits no discharge. Left eye exhibits no discharge.   Neck: Normal range of motion. Neck supple. No thyromegaly present.   Cardiovascular: Regular rhythm, normal heart sounds and intact distal pulses.    Tachycardiac--103   Pulmonary/Chest: She has no wheezes. She exhibits no tenderness.   Appears dyspneic.  Breath sounds slightly diminished on RIGHT with faint rhonchi heard on auscultation.   Abdominal: Soft. Bowel sounds are normal. She exhibits no distension. There is no tenderness. There is no guarding.   Musculoskeletal: Normal range of motion. She exhibits no edema or tenderness.   Neurological: She is alert and oriented to person, place, and time.   Skin: Skin is warm and dry. No erythema. No pallor.   Psychiatric: She has a normal mood and affect. Her behavior is normal. Judgment and thought content normal.        Significant Labs:   CBC:   Recent Labs  Lab 01/31/17  1507   WBC 22.10*   HGB 12.1   HCT 37.3        CMP:   Recent Labs  Lab 01/31/17  1506   *   K 4.1      CO2 19*   GLU 86   BUN 12   CREATININE 1.0   CALCIUM 9.7   ANIONGAP 14   EGFRNONAA 56*     Lactic Acid:   Recent Labs  Lab 01/31/17  1507 01/31/17  2157   LACTATE 2.4* 1.3     Troponin:   Recent Labs  Lab 01/31/17  1506 01/31/17  2157   TROPONINI 0.016 0.019     Urine Studies:   Recent Labs  Lab 01/31/17  2234   COLORU Yellow   APPEARANCEUA Clear   PHUR 6.0   SPECGRAV 1.015   PROTEINUA Trace*   GLUCUA Negative   KETONESU 1+*   BILIRUBINUA Negative   OCCULTUA Trace*   NITRITE  Negative   UROBILINOGEN Negative   LEUKOCYTESUR Negative       Significant Imaging: CTA chest: No CTA evidence of pulmonary embolus.  Small reticular nodular infiltrate in the right upper lobe laterally may represent an infectious process or neoplasm.  2 other soft tissue density nodules in the lung fields likely represent metastasis.  Large liver metastases are identified.  Prior granulomatous disease.     CXR:   The mediastinal and cardiac size and contours are normal.  No intrapulmonary masses or infiltrates are seen. No pneumothorax is identified.    Assessment/Plan:     * HCAP (healthcare-associated pneumonia)  CTA chest indicative of nodular infiltrate in RUL possibly infectious vs. Neoplasm.  Given immunocompromised state s/p splenectomy and recent chemotherapy-- will treat patient with IV Vanc & Zosyn.  Consult pulmonologist for evaluation.  Supplemental O2, nebulizer therapy.  Check sputum culture.      Septic shock  Associated with tachycardia, leukocytosis, lactic acidosis, fever, and hypotension despite 30 cc/kg fluid bolus.  Will bolus 1L NS.  Transfer to ICU for intensive monitoring, central line placement, and vasopressor administration.  Vanc/Zosyn for HCAP as above.  Follow blood cultures. Lactate has normalized.      Chest pain  Likely related to PNA.  Will trend troponin, monitor on telemetry.      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  Consulted Dr. Lamonte Samaniego for inpatient evaluation and management.  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      VTE Risk Mitigation         Ordered     enoxaparin injection 40 mg  Daily     Route:  Subcutaneous        01/31/17 2038   Peptic ulcer prophylaxis: Protonix     Cordelia Mota NP  Department of Hospital Medicine   [facility]

## 2017-02-01 NOTE — PLAN OF CARE
Problem: Patient Care Overview  Goal: Plan of Care Review  Outcome: Revised  On room air all day without SOB or dyspnea. C/O pain throughout the day but refusing pain meds. Ambulated hallway this am to bathroom without difficulty. Poor appetite, Boost shakes provided. Skin intact. Safety maintained.

## 2017-02-01 NOTE — NURSING
[]Hover for attribution information  Pt arrived via stretcher accompanied by spouse. Pt ambulates to restroom without falls/injuries. VS obtained. Telemetry monitor connect. Pt oriented to room. Denies pain. Reports SOB. Oriented to room. Plan of care discussed. Will continue to monitor.

## 2017-02-01 NOTE — SUBJECTIVE & OBJECTIVE
Past Medical History   Diagnosis Date    Acute ITP     Cancer      liver first round chemo 1/30/2017        Past Surgical History   Procedure Laterality Date    Splenectomy, total         Review of patient's allergies indicates:   Allergen Reactions    Plaquenil [hydroxychloroquine] Palpitations       No current facility-administered medications on file prior to encounter.      Current Outpatient Prescriptions on File Prior to Encounter   Medication Sig    oxycodone-acetaminophen (PERCOCET) 5-325 mg per tablet Take 1-2 tablets by mouth every 4 (four) hours as needed for Pain.     Family History     Problem Relation (Age of Onset)    Cancer Mother, Father, Brother, Maternal Grandfather    Coronary artery disease Maternal Grandmother    Heart disease Sister, Mother    Rheum arthritis Sister        Social History Main Topics    Smoking status: Never Smoker    Smokeless tobacco: Never Used    Alcohol use No    Drug use: No    Sexual activity: Not on file     Review of Systems   Constitutional: Positive for chills, fatigue and fever. Negative for activity change and appetite change.   HENT: Negative for congestion, postnasal drip, sinus pressure, sore throat and trouble swallowing.    Eyes: Negative for photophobia and visual disturbance.   Respiratory: Positive for chest tightness and shortness of breath. Negative for cough and wheezing.    Cardiovascular: Positive for chest pain. Negative for palpitations and leg swelling.   Gastrointestinal: Negative for abdominal distention, abdominal pain, blood in stool, constipation, diarrhea, nausea and vomiting.   Genitourinary: Negative for difficulty urinating, dysuria, flank pain, frequency and hematuria.   Musculoskeletal: Positive for arthralgias. Negative for back pain, myalgias, neck pain and neck stiffness.   Skin: Negative for color change.   Allergic/Immunologic: Positive for immunocompromised state.   Neurological: Positive for headaches (HA earlier today).  Negative for dizziness, weakness and light-headedness.   Hematological: Does not bruise/bleed easily.   Psychiatric/Behavioral: Negative for agitation and confusion. The patient is not nervous/anxious.      Objective:     Vital Signs (Most Recent):  Temp: 100.1 °F (37.8 °C) (01/31/17 2216)  Pulse: 106 (01/31/17 1948)  Resp: 18 (01/31/17 1927)  BP: (!) 105/55 (01/31/17 1948)  SpO2: 96 % (01/31/17 1948) Vital Signs (24h Range):  Temp:  [99.8 °F (37.7 °C)-100.1 °F (37.8 °C)] 100.1 °F (37.8 °C)  Pulse:  [] 106  Resp:  [18] 18  SpO2:  [94 %-100 %] 96 %  BP: ()/(55-66) 105/55     Weight: 45.4 kg (100 lb)  Body mass index is 17.71 kg/(m^2).    Physical Exam   Constitutional: She is oriented to person, place, and time. She appears well-developed and well-nourished. No distress.   HENT:   Head: Normocephalic and atraumatic.   Eyes: Conjunctivae and EOM are normal. Pupils are equal, round, and reactive to light. Right eye exhibits no discharge. Left eye exhibits no discharge.   Neck: Normal range of motion. Neck supple. No thyromegaly present.   Cardiovascular: Regular rhythm, normal heart sounds and intact distal pulses.    Tachycardiac--103   Pulmonary/Chest: She has no wheezes. She exhibits no tenderness.   Appears dyspneic.  Breath sounds slightly diminished on RIGHT with faint rhonchi heard on auscultation.   Abdominal: Soft. Bowel sounds are normal. She exhibits no distension. There is no tenderness. There is no guarding.   Musculoskeletal: Normal range of motion. She exhibits no edema or tenderness.   Neurological: She is alert and oriented to person, place, and time.   Skin: Skin is warm and dry. No erythema. No pallor.   Psychiatric: She has a normal mood and affect. Her behavior is normal. Judgment and thought content normal.        Significant Labs:   CBC:   Recent Labs  Lab 01/31/17  1507   WBC 22.10*   HGB 12.1   HCT 37.3        CMP:   Recent Labs  Lab 01/31/17  1506   *   K 4.1       CO2 19*   GLU 86   BUN 12   CREATININE 1.0   CALCIUM 9.7   ANIONGAP 14   EGFRNONAA 56*     Lactic Acid:   Recent Labs  Lab 01/31/17  1507 01/31/17  2157   LACTATE 2.4* 1.3     Troponin:   Recent Labs  Lab 01/31/17  1506 01/31/17  2157   TROPONINI 0.016 0.019     Urine Studies:   Recent Labs  Lab 01/31/17  2234   COLORU Yellow   APPEARANCEUA Clear   PHUR 6.0   SPECGRAV 1.015   PROTEINUA Trace*   GLUCUA Negative   KETONESU 1+*   BILIRUBINUA Negative   OCCULTUA Trace*   NITRITE Negative   UROBILINOGEN Negative   LEUKOCYTESUR Negative       Significant Imaging: CTA chest: No CTA evidence of pulmonary embolus.  Small reticular nodular infiltrate in the right upper lobe laterally may represent an infectious process or neoplasm.  2 other soft tissue density nodules in the lung fields likely represent metastasis.  Large liver metastases are identified.  Prior granulomatous disease.     CXR:   The mediastinal and cardiac size and contours are normal.  No intrapulmonary masses or infiltrates are seen. No pneumothorax is identified.

## 2017-02-01 NOTE — PROGRESS NOTES
02/01/17 0110   Vital Signs   SpO2 96 %   Pulse Oximetry Type Intermittent   Pulse (!) 112   Resp 20   Positioning Sitting in chair   O2 Device (Oxygen Therapy) nasal cannula   Flow (L/min) 2   RML Breath Sounds coarse   Aerosol Therapy   $ Treatment (Aerosol Therapy) aerosol neb given   SVN/Inhaler Treatment Route mask   Patient Tolerance good   Post-Treatment   Post-treatment Heart Rate (beats/min) 97   Post-treatment Resp Rate (breaths/min) 18   All Fields Breath Sounds aeration increased

## 2017-02-01 NOTE — PLAN OF CARE
02/01/17 0814   Vital Signs   SpO2 97 %   Pulse Oximetry Type Continuous   Pulse 96   Resp (!) 22   All Fields Breath Sounds Lower:;diminished   Positioning HOB elevated 30 degrees   O2 Device (Oxygen Therapy) room air   Aerosol Therapy   $ Treatment (Aerosol Therapy) aerosol neb given   SVN/Inhaler Treatment Route mask;with air   Patient Tolerance good   Post-Treatment   Post-treatment Heart Rate (beats/min) 102   Post-treatment Resp Rate (breaths/min) 20   All Fields Breath Sounds crackles, fine;aeration increased   Pt assessed, no distress noted. Pt receives Duoneb Q6, tols txs well.

## 2017-02-01 NOTE — ASSESSMENT & PLAN NOTE
S/p 1 dose chemotherapy.  Consulted Dr. Lamonte Samaniego for inpatient evaluation and management.  ?pulmonary source

## 2017-02-01 NOTE — CONSULTS
Aurelia Massey [id number] is a 71 y.o. female who has been consulted for vancomycin dosing.    The patient has the following labs:     Date Creatinine (mg/dl)    BUN WBC Count   1/31/2017 Estimated Creatinine Clearance: 36.4 mL/min (based on Cr of 1). Lab Results   Component Value Date    BUN 12 01/31/2017     Lab Results   Component Value Date    WBC 22.10 (H) 01/31/2017        Current weight is 45.4 kg (100 lb)     The patient received 1000 mg on 01/31 at 2100 as first dose.  The patient will be started on vancomycin at a dose of 750 mg every 24 hours (16 mg/kg/dose).  The vancomycin trough has been ordered for 02/02 at 2030.  Target trough goal is 15 to 20 mg/dL for pneumonia.    Patient will be followed by pharmacy for changes in renal function, toxicity, and efficacy.   Thank you for allowing us to participate in this patient's care.     Nabeel Cano, PharmD

## 2017-02-01 NOTE — ASSESSMENT & PLAN NOTE
Associated with tachycardia, leukocytosis, lactic acidosis, fever, and hypotension despite 30 cc/kg fluid bolus.  Will bolus 1L NS.  Transfer to ICU for intensive monitoring, central line placement, and vasopressor administration.  Vanc/Zosyn for HCAP as above.  Follow blood cultures. Lactate has normalized.

## 2017-02-01 NOTE — PLAN OF CARE
Problem: Patient Care Overview  Goal: Plan of Care Review  Outcome: Ongoing (interventions implemented as appropriate)  Pt transferred in to ICU for hypotension. Pt given NS bolus on floor and BP improved upon arrival to ICU. Pt denied all pain and SOB. Pt BP stable without pressor support at this time.  at bedside. Safety measures in place.

## 2017-02-01 NOTE — CONSULTS
"2/1/2017      Admit Date: 1/31/2017  Aurelia Massey  New Patient Consult    Chief Complaint   Patient presents with    Chest Pain     s/s x 2 hr - denies nausea, SOB - reports "just had my first chemo tx (for liver CA)"        History of Present Illness:  Pt is never smoker with h/o itp post splenectomy.  Has rheumatoid dz and is off rx.   She developed ruq pain couple months agos with abrupt 20+ lbs loss.  Liver bx with maligancy and chemo dosed 3 days ago.  She had acute rigors, chest heaviness, nausea and sob.  Problem started yesterday morning.  Chest pain was heavy and sharp.  Pain pill no relief.    Pt was evaualted cxr which was similar to November film and ct.  Ct viewed directly-scattered mild reticular changes espicailly upper lungs, bronchiectasis suggested, could patchy areas that look more inflamatory than malignant to my review.     Pt 3 day out from chemo and is feeling fatigued but better.         PFSH:  Past Medical History   Diagnosis Date    Acute ITP     Cancer      liver first round chemo 1/30/2017      Past Surgical History   Procedure Laterality Date    Splenectomy, total       Social History   Substance Use Topics    Smoking status: Never Smoker    Smokeless tobacco: Never Used    Alcohol use No     Family History   Problem Relation Age of Onset    Rheum arthritis Sister     Heart disease Sister      MI    Cancer Mother      leukemia    Heart disease Mother     Cancer Father      brain and lung    Cancer Brother      lung    Coronary artery disease Maternal Grandmother     Cancer Maternal Grandfather      lung     Review of patient's allergies indicates:   Allergen Reactions    Plaquenil [hydroxychloroquine] Palpitations       Performance Status:Performance Status:The patient's activity level is functions out of house.    Review of Systems:  a review of eleven systems covering constitutional, Psych, Eye, HEENT, Respiratory, Cardiac, GI, , Musculoskeletal, Endocrine, " "Dermatologicwas negative except the above mentioned abnormalities and for any pertinent findings as listed below: wt loss, abd pain as above, rheumatoid off rx and stable.       Exam:Comprehensive exam done.   Visit Vitals    BP (!) 114/55    Pulse 96    Temp 98.8 °F (37.1 °C) (Axillary)    Resp (!) 25    Ht 5' 3" (1.6 m)    Wt 45.4 kg (100 lb)    SpO2 97%    Breastfeeding No    BMI 17.71 kg/m2     Exam included Vitals as listed, and patient's appearance and affect and alertness and mood, oral exam for yeast and hygiene and pharynx lesions and Mallapatti (M) score, neck with inspection for jvd and masses and thyroid abnormalities and lymph nodes (supraclavicular and infraclavicular nodes also examined and noted if abn), chest exam included symmetry and effort and fremitus and percussion and auscultation, cardiac exam included rhythm and gallops and murmur and rubs and jvd and edema, abdominal exam for mass and hepatosplenomegaly and tenderness and hernias and bowel sounds, Musculoskeletal exam with muscle tone and posture and mobility/gait and  strenght, and skin for rashes and cyanosis and pallor and turgor, extremity for clubbing.  Findings were normal except as listed below:  M3 , slender, moist mouth, post rales bilat, nl percussion, nl cor, no jvd or edema, tachy, liver min tender/enlarged,no spleen. No clubbing.    Radiographs reviewed: view by direct vision  Ct viewed directly-scattered mild reticular changes espicailly upper lungs, bronchiectasis suggested, could patchy areas that look more inflamatory than malignant to my review.  cxr stable from nov    Labs reviewed    Results for JOSE ROLDAN (MRN [id number]) as of 2/1/2017 13:47   Ref. Range 2/1/2017 04:39   WBC Latest Ref Range: 3.90 - 12.70 K/uL 18.90 (H)   RBC Latest Ref Range: 4.00 - 5.40 M/uL 3.23 (L)   Hemoglobin Latest Ref Range: 12.0 - 16.0 g/dL 10.0 (L)   Hematocrit Latest Ref Range: 37.0 - 48.5 % 30.5 (L)   MCV Latest Ref " Range: 82 - 98 fL 95   MCH Latest Ref Range: 27.0 - 31.0 pg 31.0   MCHC Latest Ref Range: 32.0 - 36.0 % 32.8   RDW Latest Ref Range: 11.5 - 14.5 % 13.9   Platelets Latest Ref Range: 150 - 350 K/uL 253   MPV Latest Ref Range: 9.2 - 12.9 fL 9.5   Gran% Latest Ref Range: 38.0 - 73.0 % 91.6 (H)   Gran # Latest Ref Range: 1.8 - 7.7 K/uL 17.3 (H)   Lymph% Latest Ref Range: 18.0 - 48.0 % 6.9 (L)   Lymph # Latest Ref Range: 1.0 - 4.8 K/uL 1.3   Mono% Latest Ref Range: 4.0 - 15.0 % 1.1 (L)   Mono # Latest Ref Range: 0.3 - 1.0 K/uL 0.2 (L)   Eosinophil% Latest Ref Range: 0.0 - 8.0 % 0.0   Eos # Latest Ref Range: 0.0 - 0.5 K/uL 0.0   Basophil% Latest Ref Range: 0.0 - 1.9 % 0.4   Baso # Latest Ref Range: 0.00 - 0.20 K/uL 0.10   Protime Latest Ref Range: 9.0 - 12.5 sec 12.7 (H)   Coumadin Monitoring INR Latest Ref Range: 0.8 - 1.2  1.2   Sodium Latest Ref Range: 136 - 145 mmol/L 137   Potassium Latest Ref Range: 3.5 - 5.1 mmol/L 3.8   Chloride Latest Ref Range: 95 - 110 mmol/L 107   CO2 Latest Ref Range: 23 - 29 mmol/L 20 (L)   Anion Gap Latest Ref Range: 8 - 16 mmol/L 10   BUN, Bld Latest Ref Range: 8 - 23 mg/dL 10   Creatinine Latest Ref Range: 0.5 - 1.4 mg/dL 0.8   eGFR if non African American Latest Ref Range: >60 mL/min/1.73 m^2 >60   eGFR if  Latest Ref Range: >60 mL/min/1.73 m^2 >60   Glucose Latest Ref Range: 70 - 110 mg/dL 98   Calcium Latest Ref Range: 8.7 - 10.5 mg/dL 8.6 (L)   Alkaline Phosphatase Latest Ref Range: 55 - 135 U/L 96   Total Protein Latest Ref Range: 6.0 - 8.4 g/dL 5.7 (L)   Albumin Latest Ref Range: 3.5 - 5.2 g/dL 2.3 (L)   Total Bilirubin Latest Ref Range: 0.1 - 1.0 mg/dL 0.8   Bilirubin, Direct Latest Ref Range: 0.1 - 0.3 mg/dL 0.4 (H)   AST Latest Ref Range: 10 - 40 U/L 80 (H)     Impression:  Patient Active Problem List   Diagnosis    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  Had splenectomy.  Has not had chr infections.     Expect acute pneumonia despite ct/cxr appearance.  abx and culures needed.  Sputum culture if avail would help.      Could go to floor from icu.  Consider abx with chemo or if rigors in future.     Lungs are not symptomatic chronically - doubt regular rx would help.  F/u  Lung nodules on ct will be deferred to oncology--lesions too sm to bx.  Suspect inflammatory.

## 2017-02-02 PROBLEM — E87.6 HYPOKALEMIA: Status: ACTIVE | Noted: 2017-02-02

## 2017-02-02 LAB
ALBUMIN SERPL BCP-MCNC: 2.1 G/DL
ALP SERPL-CCNC: 90 U/L
ALT SERPL W/O P-5'-P-CCNC: 11 U/L
ANION GAP SERPL CALC-SCNC: 8 MMOL/L
ANISOCYTOSIS BLD QL SMEAR: SLIGHT
AST SERPL-CCNC: 58 U/L
BACTERIA UR CULT: NO GROWTH
BASOPHILS NFR BLD: 0 %
BILIRUB SERPL-MCNC: 0.6 MG/DL
BUN SERPL-MCNC: 7 MG/DL
CALCIUM SERPL-MCNC: 8.5 MG/DL
CHLORIDE SERPL-SCNC: 110 MMOL/L
CO2 SERPL-SCNC: 20 MMOL/L
CREAT SERPL-MCNC: 0.7 MG/DL
DIFFERENTIAL METHOD: ABNORMAL
EOSINOPHIL NFR BLD: 0 %
ERYTHROCYTE [DISTWIDTH] IN BLOOD BY AUTOMATED COUNT: 14.9 %
EST. GFR  (AFRICAN AMERICAN): >60 ML/MIN/1.73 M^2
EST. GFR  (NON AFRICAN AMERICAN): >60 ML/MIN/1.73 M^2
GLUCOSE SERPL-MCNC: 118 MG/DL
HCT VFR BLD AUTO: 30.5 %
HGB BLD-MCNC: 10.1 G/DL
LYMPHOCYTES NFR BLD: 7 %
MCH RBC QN AUTO: 31.1 PG
MCHC RBC AUTO-ENTMCNC: 32.9 %
MCV RBC AUTO: 94 FL
MONOCYTES NFR BLD: 1 %
NEUTROPHILS NFR BLD: 92 %
PLATELET # BLD AUTO: 245 K/UL
PLATELET BLD QL SMEAR: ABNORMAL
PMV BLD AUTO: 9.4 FL
POTASSIUM SERPL-SCNC: 3 MMOL/L
PROT SERPL-MCNC: 5.5 G/DL
RBC # BLD AUTO: 3.23 M/UL
SODIUM SERPL-SCNC: 138 MMOL/L
VANCOMYCIN TROUGH SERPL-MCNC: 5.2 UG/ML
WBC # BLD AUTO: 15.6 K/UL

## 2017-02-02 PROCEDURE — 25000242 PHARM REV CODE 250 ALT 637 W/ HCPCS: Performed by: NURSE PRACTITIONER

## 2017-02-02 PROCEDURE — 27000221 HC OXYGEN, UP TO 24 HOURS

## 2017-02-02 PROCEDURE — 94761 N-INVAS EAR/PLS OXIMETRY MLT: CPT

## 2017-02-02 PROCEDURE — 36415 COLL VENOUS BLD VENIPUNCTURE: CPT

## 2017-02-02 PROCEDURE — 80053 COMPREHEN METABOLIC PANEL: CPT

## 2017-02-02 PROCEDURE — 85007 BL SMEAR W/DIFF WBC COUNT: CPT

## 2017-02-02 PROCEDURE — 25000003 PHARM REV CODE 250: Performed by: EMERGENCY MEDICINE

## 2017-02-02 PROCEDURE — 63600175 PHARM REV CODE 636 W HCPCS: Performed by: INTERNAL MEDICINE

## 2017-02-02 PROCEDURE — 99233 SBSQ HOSP IP/OBS HIGH 50: CPT | Mod: ,,, | Performed by: INTERNAL MEDICINE

## 2017-02-02 PROCEDURE — 63600175 PHARM REV CODE 636 W HCPCS: Performed by: NURSE PRACTITIONER

## 2017-02-02 PROCEDURE — 12000002 HC ACUTE/MED SURGE SEMI-PRIVATE ROOM

## 2017-02-02 PROCEDURE — 80202 ASSAY OF VANCOMYCIN: CPT

## 2017-02-02 PROCEDURE — 25000003 PHARM REV CODE 250: Performed by: INTERNAL MEDICINE

## 2017-02-02 PROCEDURE — 85027 COMPLETE CBC AUTOMATED: CPT

## 2017-02-02 PROCEDURE — 25000003 PHARM REV CODE 250: Performed by: NURSE PRACTITIONER

## 2017-02-02 PROCEDURE — 94640 AIRWAY INHALATION TREATMENT: CPT

## 2017-02-02 RX ORDER — POTASSIUM CHLORIDE 20 MEQ/1
40 TABLET, EXTENDED RELEASE ORAL ONCE
Status: COMPLETED | OUTPATIENT
Start: 2017-02-02 | End: 2017-02-02

## 2017-02-02 RX ORDER — DIPHENHYDRAMINE HCL 25 MG
25 CAPSULE ORAL EVERY 6 HOURS PRN
Status: DISCONTINUED | OUTPATIENT
Start: 2017-02-03 | End: 2017-02-04 | Stop reason: HOSPADM

## 2017-02-02 RX ADMIN — PIPERACILLIN SODIUM AND TAZOBACTAM SODIUM 4.5 G: 4; .5 INJECTION, POWDER, FOR SOLUTION INTRAVENOUS at 11:02

## 2017-02-02 RX ADMIN — POTASSIUM CHLORIDE 40 MEQ: 20 TABLET, EXTENDED RELEASE ORAL at 12:02

## 2017-02-02 RX ADMIN — PIPERACILLIN SODIUM AND TAZOBACTAM SODIUM 4.5 G: 4; .5 INJECTION, POWDER, FOR SOLUTION INTRAVENOUS at 12:02

## 2017-02-02 RX ADMIN — MOXIFLOXACIN HYDROCHLORIDE 400 MG: 400 INJECTION, SOLUTION INTRAVENOUS at 03:02

## 2017-02-02 RX ADMIN — IPRATROPIUM BROMIDE AND ALBUTEROL SULFATE 3 ML: .5; 3 SOLUTION RESPIRATORY (INHALATION) at 12:02

## 2017-02-02 RX ADMIN — DIPHENHYDRAMINE HYDROCHLORIDE 25 MG: 25 CAPSULE ORAL at 11:02

## 2017-02-02 RX ADMIN — PIPERACILLIN SODIUM AND TAZOBACTAM SODIUM 4.5 G: 4; .5 INJECTION, POWDER, FOR SOLUTION INTRAVENOUS at 03:02

## 2017-02-02 RX ADMIN — ENOXAPARIN SODIUM 40 MG: 100 INJECTION SUBCUTANEOUS at 12:02

## 2017-02-02 RX ADMIN — IPRATROPIUM BROMIDE AND ALBUTEROL SULFATE 3 ML: .5; 3 SOLUTION RESPIRATORY (INHALATION) at 07:02

## 2017-02-02 RX ADMIN — SODIUM CHLORIDE: 0.9 INJECTION, SOLUTION INTRAVENOUS at 03:02

## 2017-02-02 RX ADMIN — PANTOPRAZOLE SODIUM 40 MG: 40 TABLET, DELAYED RELEASE ORAL at 09:02

## 2017-02-02 NOTE — PLAN OF CARE
02/02/17 1230   Patient Assessment/Suction   Level of Consciousness (AVPU) alert   Respiratory Effort Normal   All Lung Fields Breath Sounds crackles fine   PRE-TX-O2-ETCO2   O2 Device (Oxygen Therapy) nasal cannula   Flow (L/min) 1   SpO2 97 %   Pulse 95   Resp (!) 22   Positioning HOB elevated 45 degrees   Aerosol Therapy   $ Aerosol Therapy Charges Aerosol Treatment   Respiratory Treatment Status given   SVN/Inhaler Treatment Route mask;with air   Position During Treatment HOB at 45 degrees   Patient Tolerance good   Post-Treatment   Post-treatment Heart Rate (beats/min) 92   Post-treatment Resp Rate (breaths/min) 20   All Fields Breath Sounds aeration increased   Pt assessed, no distress noted. Pt receives Duoneb Q6, tols txs well.

## 2017-02-02 NOTE — NURSING
Dr. Harris here earlier and now pt seen by Dr. Aburto. Transfer orders received and initiated.Report called to PCU and given to Camila. Pt transferred to room 206 per wc and accompanied by myself and spouse.No acute distress or discomfort noted. Up in chair at bedside and italo well

## 2017-02-02 NOTE — NURSING
Pt to room 206 via wheelchair. NAD. VSS. Pt sitting up in chair. Denies pain.  at bedside. Will continue to monitor.

## 2017-02-02 NOTE — PLAN OF CARE
Problem: Patient Care Overview  Goal: Plan of Care Review  Outcome: Ongoing (interventions implemented as appropriate)  Medicated for c/o right abd pain x 1 with morphine. Dozed for short periods. Awake most of night. Light off per patient request.  at bedside.    Problem: Sepsis/Septic Shock (Adult)  Intervention: Provide Oxygenation/Ventilation/Perfusion Support  WBC decreased from 18.9 to 15.6. Afebrile. Resp even and unlabored with 2 liters/nasal cannula. No further c/o hallucinations of dead people

## 2017-02-02 NOTE — PLAN OF CARE
02/02/17 0015   Vital Signs   SpO2 100 %   Pulse 88   Resp 15   All Fields Breath Sounds diminished   O2 Device (Oxygen Therapy) nasal cannula   Flow (L/min) 2   Aerosol Therapy   $ Treatment (Aerosol Therapy) aerosol neb given   SVN/Inhaler Treatment Route mask   Patient Tolerance good   Post-Treatment   Post-treatment Heart Rate (beats/min) 86   Post-treatment Resp Rate (breaths/min) 16   All Fields Breath Sounds unchanged       Pt. Stated the treatments were making her throat sore

## 2017-02-02 NOTE — PROGRESS NOTES
"Progress Note  Hospital Medicine  Patient Name:Aurelia Massey  MRN:  [id number]  Patient Class: IP- Inpatient  Admit Date: 1/31/2017  Length of Stay: 2 days  Expected Discharge Date:   Attending Physician: Epi Harris MD  Primary Care Provider:  SADIA Whitehead MD    SUBJECTIVE:     Principal Problem: HCAP (healthcare-associated pneumonia)  Initial history of present illness:Aurelia Massey is a 71 y.o. 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 She was admitted to the service of hospital medicine with HCAP and severe sepsis. She reported to ED with complaint of sudden onset sternal CP that began approximately 1 hour PTA. She states the pain was a pressure-like, severe pain "unlike anything I've ever had" with radiation to the upper RIGHT chest wall and was accompanied by mild SOB. She reported the pain worsened with inspiration, did not respond to oral home pain medication, and was eventually alleviated with morphine in the ED. She denies any cough, does endorse fever and chills. She had her first chemotherapy treatment Monday and is under the care of Dr. Lamonte Whitehead. She called his office prior to coming to ED and was told to report for emergency evaluation. She denies any nausea, vomiting, or abdominal pain at present.     PMH/PSH/SH/FH/Meds: reviewed.    Symptoms/Review of Systems: Off pressors. Weak, feeling a little stronger. Denied complaints. No shortness of breath, cough, chest pain or headache, fever or abdominal pain.     Diet:  Adequate intake.    Activity level: Normal.    Pain:  Patient reports no pain.       OBJECTIVE:   Vital Signs (Most Recent):      Temp: 98.7 °F (37.1 °C) (02/02/17 1100)  Pulse: 97 (02/02/17 1100)  Resp: (!) 29 (02/02/17 1100)  BP: 118/82 (02/02/17 1100)  SpO2: 97 % (02/02/17 1100)       Vital Signs Range (Last 24H):  Temp:  [97.5 °F (36.4 °C)-99 °F (37.2 °C)]   Pulse:  " []   Resp:  [15-44]   BP: ()/(55-82)   SpO2:  [93 %-100 %]     Weight: 49.4 kg (108 lb 14.5 oz)  Body mass index is 19.29 kg/(m^2).    Intake/Output Summary (Last 24 hours) at 02/02/17 1143  Last data filed at 02/02/17 0800   Gross per 24 hour   Intake             3480 ml   Output             1325 ml   Net             2155 ml     Physical Examination:  Constitutional: She is oriented to person, place, and time. She appears well-developed and well-nourished. No distress.   HENT:   Head: Normocephalic and atraumatic.   Eyes: Conjunctivae and EOM are normal. Pupils are equal, round, and reactive to light. Right eye exhibits no discharge. Left eye exhibits no discharge.   Neck: Normal range of motion. Neck supple. No thyromegaly present.   Cardiovascular: Regular rhythm, normal heart sounds and intact distal pulses.   Tachycardiac--103   Pulmonary/Chest: She has no wheezes. She exhibits no tenderness.   Bibasal rales.  Abdominal: Soft. Bowel sounds are normal. She exhibits no distension. There is no tenderness. There is no guarding.   Musculoskeletal: Normal range of motion. She exhibits no edema or tenderness.   Neurological: She is alert and oriented to person, place, and time.   Skin: Skin is warm and dry. No erythema. No pallor.   Psychiatric: She has a normal mood and affect. Her behavior is normal. Judgment and thought content normal.     CBC:    Recent Labs  Lab 01/31/17  1507 02/01/17  0439 02/02/17  0321   WBC 22.10* 18.90* 15.60*   RBC 3.97* 3.23* 3.23*   HGB 12.1 10.0* 10.1*   HCT 37.3 30.5* 30.5*    253 245   MCV 94 95 94   MCH 30.6 31.0 31.1*   MCHC 32.5 32.8 32.9   BMP    Recent Labs  Lab 01/31/17  1506 02/01/17  0439 02/02/17  0321   GLU 86 98 118*   * 137 138   K 4.1 3.8 3.0*    107 110   CO2 19* 20* 20*   BUN 12 10 7*   CREATININE 1.0 0.8 0.7   CALCIUM 9.7 8.6* 8.5*      Diagnostic Results:  Microbiology Results (last 7 days)     Procedure Component Value Units Date/Time     Urine culture [[id number]] Collected:  01/31/17 2235    Order Status:  Completed Specimen:  Urine from Urine, Clean Catch Updated:  02/02/17 1055     Urine Culture, Routine No growth    Blood culture [[id number]] Collected:  01/31/17 1753    Order Status:  Completed Specimen:  Blood Updated:  02/01/17 2212     Blood Culture, Routine No Growth to date     Blood Culture, Routine No Growth to date    Blood culture [[id number]] Collected:  01/31/17 1759    Order Status:  Completed Specimen:  Blood Updated:  02/01/17 2212     Blood Culture, Routine No Growth to date     Blood Culture, Routine No Growth to date    Culture, Respiratory with Gram Stain [[id number]]     Order Status:  No result Specimen:  Respiratory          Significant Imaging: CTA chest: No CTA evidence of pulmonary embolus.  Small reticular nodular infiltrate in the right upper lobe laterally may represent an infectious process or neoplasm.  2 other soft tissue density nodules in the lung fields likely represent metastasis.  Large liver metastases are identified.  Prior granulomatous disease.      CXR: The mediastinal and cardiac size and contours are normal.  No intrapulmonary masses or infiltrates are seen. No pneumothorax is identified.     Assessment/Plan:     HCAP (healthcare-associated pneumonia)  CTA chest indicative of nodular infiltrate in RUL possibly infectious vs. Neoplasm. Given immunocompromised state s/p splenectomy and recent chemotherapy-- will treat patient with IV Vanc & Zosyn. Consult pulmonologist for evaluation. Supplemental O2, nebulizer therapy. Check sputum culture.  Add avelox.      Septic shock - Improving  Heplock IVF  Vanc/Zosyn/Avelox for HCAP as above. Follow blood cultures. Lactate has normalized.     Chest pain  Likely related to PNA. Will trend troponin, monitor on telemetry.     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 Follow Dr. Lamonte Samaniego's recommendations.     Rheumatoid arthritis involving  both wrists with positive rheumatoid factor  PRN opiates for pain.    Transfer to [facility].     VTE Risk Mitigation         Ordered     Place sequential compression device  Until discontinued      02/01/17 1820     enoxaparin injection 40 mg  Daily     Route:  Subcutaneous        01/31/17 2038        Epi Harris MD  Department of Hospital Medicine   [facility]

## 2017-02-02 NOTE — CONSULTS
Consultation was requested per Dr. Harris.    DATE OF CONSULTATION:  02/01/2017    HISTORY OF PRESENT ILLNESS:  This 71-year-old female was admitted to the   hospital with shortness of breath and pleuritic chest pain.  She has been found   to have right upper lobe infiltrate per CAT scan study.  Temperature was 100.1.    She did have shortness of breath, but denied cough.  She is felt to have   hospital-associated pneumonia and has been started on antibiotics per Dr. Harris.  Cultures are pending.    She recently was hospitalized with abdominal pain and was found to have multiple   masses in the liver consistent with metastatic disease.  She had a liver   biopsy.  This showed poorly differentiated carcinoma.  She had upper and lower   endoscopy without positive findings.  Mammogram was done, results are pending.    At this time, she would be referred to as metastatic cancer of unknown primary.    I suspect that she has a cholangiocarcinoma or pancreaticobiliary cancer,   multiple masses in the liver with perihepatic lymphadenopathy was found on last   determination.    She has been started on palliative chemotherapy day 1 course 1 of Abraxane.    Gemzar was given 01/30/2017.  Chemotherapy is on hold for now.    Peripheral veins are poor.  She will need Port-A-Catheter placement at a later   date.  Chronic abdominal pain in the right upper quadrant is a problem for her.    She was seen in the Emergency Room on last Friday and was started on Duragesic   patch every 3 days and she takes Norco p.r.n. pain.  This will be continued.    Other history includes ITP with a history of splenectomy many years in the past   and she also has a recent diagnosis of rheumatoid arthritis treated with   methotrexate and folic acid.    She does not smoke.  She does not drink alcohol with regularity.  She does not   have a history of allergies to medications or drug reactions.    Long-term medications include Fosamax 70 mg weekly,  folic acid 1 mg daily,   methotrexate 2.5 mg four weekly.    Appetite is poor.  She does admit to shortness of breath and pleuritic chest   pain on deep inspiration.  She does not have palpable lymphadenopathy.  She does   have bilateral rhonchi on auscultation of the chest showed.  Breathing is   unlabored, O2 is in place.  She has a regular rhythm without murmur.  Abdomen is   nontender.  I do not palpate hepatomegaly or splenomegaly.  Abdomen is not   distended.  Calves are nontender.  She does not have peripheral edema, petechiae   or purpura.  She does not have palpable lymphadenopathy.  Grossly, she is   neurologically intact.  She does not have pallor, jaundice or icterus.    White blood count is 22,000, hemoglobin is running 10 to 12.  CAT scan of the   chest showed infiltrate in the right upper lobe.  Pulmonary emboli was not seen.    Chest x-ray was clear.    This individual comes in with pleuritic chest pain, shortness of breath,   low-grade temperature of 100.1 with infiltrate found in right upper lobe on CAT   scan study.  Clinical diagnosis is hospital-associated pneumonia.    She is on antibiotics per Dr. Harris.  Cultures are pending.    She has metastatic cancer of unknown primary, recently started on Abraxane,   Gemzar chemotherapy for palliation.  Chemotherapy is on hold for now.  I suspect   she has a cholangiocarcinoma or possible pancreaticobiliary carcinoma as her   primary of origin.    She has history of ITP status post splenectomy, stable at this time.  She has   history of rheumatoid arthritis, previously treated with Plaquenil with poor   tolerability and recently placed on methotrexate and folic acid with better   tolerance.    She is being given Lovenox prophylaxis to reduce the risk of blood clot events   here.  We will apply SCDs to the legs to try and reduce the risk of blood clot   events here.    Thank you for the consultation.  We will follow up on her intermittently while   in  [facility].      JAN  dd: 02/01/2017 18:17:23 (CST)  td: 02/01/2017 19:44:50 (CST)  Doc ID   #[id number]  Job ID #[id number]    CC:

## 2017-02-02 NOTE — PROGRESS NOTES
Progress Note  PULMONARY    Admit Date: 1/31/2017 2/2/2017        SUBJECTIVE:     Weak but feeling better, no c/o.      PFSH and Allergies reviewed.    OBJECTIVE:     Vitals (Most recent):  Vitals:    02/02/17 1300   BP: (!) 147/75   Pulse: 109   Resp: (!) 29   Temp:        Vitals (24 hour range):  Temp:  [97.5 °F (36.4 °C)-99 °F (37.2 °C)]   Pulse:  []   Resp:  [15-44]   BP: ()/(55-82)   SpO2:  [93 %-100 %]       Intake/Output Summary (Last 24 hours) at 02/02/17 1527  Last data filed at 02/02/17 0800   Gross per 24 hour   Intake             3300 ml   Output             1325 ml   Net             1975 ml          Physical Exam:  The patient's neuro status (alertness,orientation,cognitive function,motor skills,), pharyngeal exam (oral lesions, hygiene, abn dentition,), Neck (jvd,mass,thyroid,nodes in neck and above/below clavicle),RESPIRATORY(symmetry,effort,fremitus,percussion,auscultation),  Cor(rhythm,heart tones including gallops,perfusion,edema)ABD(distention,hepatic&splenomegaly,tenderness,masses), Skin(rash,cyanosis),Psyc(affect,judgement,).  Exam negative except for these pertinent findings:    Alert and frail, no jvd or edema, good bs, soft abd.     Diagnostic Results:  All labs last 4 entries of CBC,CMP/BMP,AGB, MICRO reviewed.  All rediographs of chest, lungs, & abd reviewed by direct vision last 3 days.       Results for JOSE ROLDAN (MRN [id number]) as of 2/2/2017 15:27   Ref. Range 2/2/2017 03:21   WBC Latest Ref Range: 3.90 - 12.70 K/uL 15.60 (H)   RBC Latest Ref Range: 4.00 - 5.40 M/uL 3.23 (L)   Hemoglobin Latest Ref Range: 12.0 - 16.0 g/dL 10.1 (L)   Hematocrit Latest Ref Range: 37.0 - 48.5 % 30.5 (L)   MCV Latest Ref Range: 82 - 98 fL 94   MCH Latest Ref Range: 27.0 - 31.0 pg 31.1 (H)   MCHC Latest Ref Range: 32.0 - 36.0 % 32.9   RDW Latest Ref Range: 11.5 - 14.5 % 14.9 (H)   Platelets Latest Ref Range: 150 - 350 K/uL 245   MPV Latest Ref Range: 9.2 - 12.9 fL 9.4   Gran% Latest  Ref Range: 38.0 - 73.0 % 92.0 (H)   Lymph% Latest Ref Range: 18.0 - 48.0 % 7.0 (L)   Mono% Latest Ref Range: 4.0 - 15.0 % 1.0 (L)   Eosinophil% Latest Ref Range: 0.0 - 8.0 % 0.0   Basophil% Latest Ref Range: 0.0 - 1.9 % 0.0   Aniso Unknown Slight   Platelet Estimate Unknown Appears normal   Sodium Latest Ref Range: 136 - 145 mmol/L 138   Potassium Latest Ref Range: 3.5 - 5.1 mmol/L 3.0 (L)   Chloride Latest Ref Range: 95 - 110 mmol/L 110   CO2 Latest Ref Range: 23 - 29 mmol/L 20 (L)   Anion Gap Latest Ref Range: 8 - 16 mmol/L 8   BUN, Bld Latest Ref Range: 8 - 23 mg/dL 7 (L)   Creatinine Latest Ref Range: 0.5 - 1.4 mg/dL 0.7   eGFR if non African American Latest Ref Range: >60 mL/min/1.73 m^2 >60   eGFR if  Latest Ref Range: >60 mL/min/1.73 m^2 >60   Glucose Latest Ref Range: 70 - 110 mg/dL 118 (H)   Calcium Latest Ref Range: 8.7 - 10.5 mg/dL 8.5 (L)   Alkaline Phosphatase Latest Ref Range: 55 - 135 U/L 90   Total Protein Latest Ref Range: 6.0 - 8.4 g/dL 5.5 (L)   Albumin Latest Ref Range: 3.5 - 5.2 g/dL 2.1 (L)   Total Bilirubin Latest Ref Range: 0.1 - 1.0 mg/dL 0.6   AST Latest Ref Range: 10 - 40 U/L 58 (H)   ALT Latest Ref Range: 10 - 44 U/L 11             ASSESSMENT/PLAN:     Patient Active Problem List   Diagnosis    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      Consider abx for prn use/post chemo with h/o spleen  resection and current sepsis.  Re check cxr , can go to floor.                                      .

## 2017-02-02 NOTE — CONSULTS
70 y/o female with pleuritic chest pain, SOB, no cough, Temp 100.1.  Rhonchi R>L chest.  L/S NP, Abd NT  Calf NT L & R  WBC 22,000  CXR clear, CAT RUL infiltrate.  Hospital acquired pneumonia, on antibiotics.  C/S pending.    Metastatic cancer of unknown primary.  Suspect cholangiocarcinoma or pancreaticobiliary cancer.  Multiple masses in the liver, para hepatic lymphadenopathy.  Day 1 Course 1 Abraxane/ Gemzar chemotherapy on 1/30/17.  Chemo on hold for now.    Hx of ITP, S/P splenectomy  RA, treated with MTX, folic acid.  Consult dictated.

## 2017-02-03 LAB
ALBUMIN SERPL BCP-MCNC: 2.1 G/DL
ALP SERPL-CCNC: 105 U/L
ALT SERPL W/O P-5'-P-CCNC: 18 U/L
ANION GAP SERPL CALC-SCNC: 8 MMOL/L
AST SERPL-CCNC: 54 U/L
BASOPHILS # BLD AUTO: 0 K/UL
BASOPHILS NFR BLD: 0 %
BILIRUB SERPL-MCNC: 0.7 MG/DL
BUN SERPL-MCNC: 8 MG/DL
CALCIUM SERPL-MCNC: 8.5 MG/DL
CHLORIDE SERPL-SCNC: 111 MMOL/L
CO2 SERPL-SCNC: 20 MMOL/L
CREAT SERPL-MCNC: 0.6 MG/DL
DIFFERENTIAL METHOD: ABNORMAL
EOSINOPHIL # BLD AUTO: 0.1 K/UL
EOSINOPHIL NFR BLD: 0.8 %
ERYTHROCYTE [DISTWIDTH] IN BLOOD BY AUTOMATED COUNT: 14.6 %
EST. GFR  (AFRICAN AMERICAN): >60 ML/MIN/1.73 M^2
EST. GFR  (NON AFRICAN AMERICAN): >60 ML/MIN/1.73 M^2
GLUCOSE SERPL-MCNC: 108 MG/DL
HCT VFR BLD AUTO: 30.8 %
HGB BLD-MCNC: 10.1 G/DL
LYMPHOCYTES # BLD AUTO: 1.4 K/UL
LYMPHOCYTES NFR BLD: 19.7 %
MCH RBC QN AUTO: 31 PG
MCHC RBC AUTO-ENTMCNC: 33 %
MCV RBC AUTO: 94 FL
MONOCYTES # BLD AUTO: 0 K/UL
MONOCYTES NFR BLD: 0.6 %
NEUTROPHILS # BLD AUTO: 5.6 K/UL
NEUTROPHILS NFR BLD: 78.9 %
NRBC BLD-RTO: 0 /100 WBC
PLATELET # BLD AUTO: 242 K/UL
PMV BLD AUTO: 9.8 FL
POTASSIUM SERPL-SCNC: 3.4 MMOL/L
PROT SERPL-MCNC: 5.5 G/DL
RBC # BLD AUTO: 3.27 M/UL
SODIUM SERPL-SCNC: 139 MMOL/L
WBC # BLD AUTO: 7.1 K/UL

## 2017-02-03 PROCEDURE — 94761 N-INVAS EAR/PLS OXIMETRY MLT: CPT

## 2017-02-03 PROCEDURE — 25000242 PHARM REV CODE 250 ALT 637 W/ HCPCS: Performed by: NURSE PRACTITIONER

## 2017-02-03 PROCEDURE — 99232 SBSQ HOSP IP/OBS MODERATE 35: CPT | Mod: ,,, | Performed by: INTERNAL MEDICINE

## 2017-02-03 PROCEDURE — 36415 COLL VENOUS BLD VENIPUNCTURE: CPT

## 2017-02-03 PROCEDURE — 85025 COMPLETE CBC W/AUTO DIFF WBC: CPT

## 2017-02-03 PROCEDURE — 25000003 PHARM REV CODE 250: Performed by: INTERNAL MEDICINE

## 2017-02-03 PROCEDURE — 97116 GAIT TRAINING THERAPY: CPT

## 2017-02-03 PROCEDURE — 12000002 HC ACUTE/MED SURGE SEMI-PRIVATE ROOM

## 2017-02-03 PROCEDURE — 63600175 PHARM REV CODE 636 W HCPCS: Performed by: INTERNAL MEDICINE

## 2017-02-03 PROCEDURE — 80053 COMPREHEN METABOLIC PANEL: CPT

## 2017-02-03 PROCEDURE — 63600175 PHARM REV CODE 636 W HCPCS: Performed by: NURSE PRACTITIONER

## 2017-02-03 PROCEDURE — 87205 SMEAR GRAM STAIN: CPT

## 2017-02-03 PROCEDURE — 25000003 PHARM REV CODE 250: Performed by: EMERGENCY MEDICINE

## 2017-02-03 PROCEDURE — 25000003 PHARM REV CODE 250: Performed by: NURSE PRACTITIONER

## 2017-02-03 PROCEDURE — 97161 PT EVAL LOW COMPLEX 20 MIN: CPT

## 2017-02-03 PROCEDURE — 87070 CULTURE OTHR SPECIMN AEROBIC: CPT

## 2017-02-03 PROCEDURE — 94640 AIRWAY INHALATION TREATMENT: CPT

## 2017-02-03 RX ORDER — POTASSIUM CHLORIDE 20 MEQ/1
40 TABLET, EXTENDED RELEASE ORAL ONCE
Status: COMPLETED | OUTPATIENT
Start: 2017-02-03 | End: 2017-02-03

## 2017-02-03 RX ORDER — LEVOFLOXACIN 500 MG/1
500 TABLET, FILM COATED ORAL DAILY
Qty: 7 TABLET | Refills: 2 | Status: SHIPPED | OUTPATIENT
Start: 2017-02-03 | End: 2017-02-04

## 2017-02-03 RX ORDER — POLYETHYLENE GLYCOL 3350 17 G/17G
17 POWDER, FOR SOLUTION ORAL DAILY PRN
Status: DISCONTINUED | OUTPATIENT
Start: 2017-02-03 | End: 2017-02-04 | Stop reason: HOSPADM

## 2017-02-03 RX ADMIN — PIPERACILLIN SODIUM AND TAZOBACTAM SODIUM 4.5 G: 4; .5 INJECTION, POWDER, FOR SOLUTION INTRAVENOUS at 06:02

## 2017-02-03 RX ADMIN — IPRATROPIUM BROMIDE AND ALBUTEROL SULFATE 3 ML: .5; 3 SOLUTION RESPIRATORY (INHALATION) at 01:02

## 2017-02-03 RX ADMIN — VANCOMYCIN HYDROCHLORIDE 500 MG: 500 INJECTION, POWDER, LYOPHILIZED, FOR SOLUTION INTRAVENOUS at 03:02

## 2017-02-03 RX ADMIN — POLYETHYLENE GLYCOL 3350 17 G: 17 POWDER, FOR SOLUTION ORAL at 06:02

## 2017-02-03 RX ADMIN — POTASSIUM CHLORIDE 40 MEQ: 1500 TABLET, EXTENDED RELEASE ORAL at 01:02

## 2017-02-03 RX ADMIN — VANCOMYCIN HYDROCHLORIDE 500 MG: 500 INJECTION, POWDER, LYOPHILIZED, FOR SOLUTION INTRAVENOUS at 08:02

## 2017-02-03 RX ADMIN — PIPERACILLIN SODIUM AND TAZOBACTAM SODIUM 4.5 G: 4; .5 INJECTION, POWDER, FOR SOLUTION INTRAVENOUS at 11:02

## 2017-02-03 RX ADMIN — PANTOPRAZOLE SODIUM 40 MG: 40 TABLET, DELAYED RELEASE ORAL at 08:02

## 2017-02-03 RX ADMIN — IPRATROPIUM BROMIDE AND ALBUTEROL SULFATE 3 ML: .5; 3 SOLUTION RESPIRATORY (INHALATION) at 07:02

## 2017-02-03 RX ADMIN — MOXIFLOXACIN HYDROCHLORIDE 400 MG: 400 INJECTION, SOLUTION INTRAVENOUS at 04:02

## 2017-02-03 RX ADMIN — ENOXAPARIN SODIUM 40 MG: 100 INJECTION SUBCUTANEOUS at 11:02

## 2017-02-03 RX ADMIN — PIPERACILLIN SODIUM AND TAZOBACTAM SODIUM 4.5 G: 4; .5 INJECTION, POWDER, FOR SOLUTION INTRAVENOUS at 03:02

## 2017-02-03 RX ADMIN — VANCOMYCIN HYDROCHLORIDE 500 MG: 500 INJECTION, POWDER, LYOPHILIZED, FOR SOLUTION INTRAVENOUS at 09:02

## 2017-02-03 NOTE — PLAN OF CARE
02/02/17 1930   Patient Assessment/Suction   Level of Consciousness (AVPU) alert   Respiratory Effort Normal   All Lung Fields Breath Sounds diminished   Cough Type good;nonproductive   PRE-TX-O2-ETCO2   Oxygen Concentration (%) 21   SpO2 98 %   Pulse 96   Resp 20   Aerosol Therapy   $ Aerosol Therapy Charges Aerosol Treatment   Respiratory Treatment Status given   SVN/Inhaler Treatment Route mask;with oxygen   Post-Treatment   Post-treatment Heart Rate (beats/min) 94   Post-treatment Resp Rate (breaths/min) 18   All Fields Breath Sounds diminished

## 2017-02-03 NOTE — PROGRESS NOTES
Progress Note  PULMONARY    Admit Date: 1/31/2017  2/3/2017        SUBJECTIVE:     Feb 2,Weak but feeling better, no c/o.   feb 3, better today but as expected weak.    PFSH and Allergies reviewed.    OBJECTIVE:     Vitals (Most recent):  Vitals:    02/03/17 1348   BP: 119/64   Pulse: 92   Resp: 16   Temp: 98.3 °F (36.8 °C)       Vitals (24 hour range):  Temp:  [97.1 °F (36.2 °C)-98.3 °F (36.8 °C)]   Pulse:  []   Resp:  [12-20]   BP: (101-129)/(64-78)   SpO2:  [95 %-99 %]     No intake or output data in the 24 hours ending 02/03/17 1626       Physical Exam:  The patient's neuro status (alertness,orientation,cognitive function,motor skills,), pharyngeal exam (oral lesions, hygiene, abn dentition,), Neck (jvd,mass,thyroid,nodes in neck and above/below clavicle),RESPIRATORY(symmetry,effort,fremitus,percussion,auscultation),  Cor(rhythm,heart tones including gallops,perfusion,edema)ABD(distention,hepatic&splenomegaly,tenderness,masses), Skin(rash,cyanosis),Psyc(affect,judgement,).  Exam negative except for these pertinent findings:    Alert and frail, no jvd or edema, good bs, soft abd.  Stable 2/3/2017      Diagnostic Results:  All labs last 4 entries of CBC,CMP/BMP,AGB, MICRO reviewed.  All rediographs of chest, lungs, & abd reviewed by direct vision last 3 days.        Results for JOSE ROLDAN (MRN [id number]) as of 2/3/2017 16:24   Ref. Range 2/3/2017 04:17   WBC Latest Ref Range: 3.90 - 12.70 K/uL 7.10   RBC Latest Ref Range: 4.00 - 5.40 M/uL 3.27 (L)   Hemoglobin Latest Ref Range: 12.0 - 16.0 g/dL 10.1 (L)   Hematocrit Latest Ref Range: 37.0 - 48.5 % 30.8 (L)   MCV Latest Ref Range: 82 - 98 fL 94   MCH Latest Ref Range: 27.0 - 31.0 pg 31.0   MCHC Latest Ref Range: 32.0 - 36.0 % 33.0   RDW Latest Ref Range: 11.5 - 14.5 % 14.6 (H)   Platelets Latest Ref Range: 150 - 350 K/uL 242   MPV Latest Ref Range: 9.2 - 12.9 fL 9.8   Gran% Latest Ref Range: 38.0 - 73.0 % 78.9 (H)   Gran # Latest Ref Range: 1.8 - 7.7  K/uL 5.6   Lymph% Latest Ref Range: 18.0 - 48.0 % 19.7   Lymph # Latest Ref Range: 1.0 - 4.8 K/uL 1.4   Mono% Latest Ref Range: 4.0 - 15.0 % 0.6 (L)   Mono # Latest Ref Range: 0.3 - 1.0 K/uL 0.0 (L)   Eosinophil% Latest Ref Range: 0.0 - 8.0 % 0.8   Eos # Latest Ref Range: 0.0 - 0.5 K/uL 0.1   Basophil% Latest Ref Range: 0.0 - 1.9 % 0.0   Baso # Latest Ref Range: 0.00 - 0.20 K/uL 0.00   nRBC Latest Ref Range: 0 /100 WBC 0   Sodium Latest Ref Range: 136 - 145 mmol/L 139   Potassium Latest Ref Range: 3.5 - 5.1 mmol/L 3.4 (L)   Chloride Latest Ref Range: 95 - 110 mmol/L 111 (H)   CO2 Latest Ref Range: 23 - 29 mmol/L 20 (L)   Anion Gap Latest Ref Range: 8 - 16 mmol/L 8   BUN, Bld Latest Ref Range: 8 - 23 mg/dL 8   Creatinine Latest Ref Range: 0.5 - 1.4 mg/dL 0.6   eGFR if non African American Latest Ref Range: >60 mL/min/1.73 m^2 >60   eGFR if  Latest Ref Range: >60 mL/min/1.73 m^2 >60   Glucose Latest Ref Range: 70 - 110 mg/dL 108   Calcium Latest Ref Range: 8.7 - 10.5 mg/dL 8.5 (L)   Alkaline Phosphatase Latest Ref Range: 55 - 135 U/L 105   Total Protein Latest Ref Range: 6.0 - 8.4 g/dL 5.5 (L)   Albumin Latest Ref Range: 3.5 - 5.2 g/dL 2.1 (L)   Total Bilirubin Latest Ref Range: 0.1 - 1.0 mg/dL 0.7   AST Latest Ref Range: 10 - 40 U/L 54 (H)   ALT Latest Ref Range: 10 - 44 U/L 18     cxr looks good feb 3, direct view    ASSESSMENT/PLAN:     Patient Active Problem List   Diagnosis    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      Consider abx for prn use/post chemo with h/o spleen resection and current sepsis.  Re check cxr , can go to floor.    Feb 3, doing better, no pos cultures, could go outpt 2/4 on 7 days levaquin,  Would give script for levaquin to start for any febrile illness with h/o spleen removal.      Dr Whitehead indicates chemo on hold.                                  .

## 2017-02-03 NOTE — CONSULTS
Aurelia Massey [id number] is a 71 y.o. female who has been consulted for vancomycin dosing.    The patient has the following labs:     Date Creatinine (mg/dl)    BUN WBC Count   2/2/2017 Estimated Creatinine Clearance: 56.7 mL/min (based on Cr of 0.7). Lab Results   Component Value Date    BUN 7 (L) 02/02/2017     Lab Results   Component Value Date    WBC 15.60 (H) 02/02/2017        Current weight is 49.4 kg (108 lb 14.5 oz)    Pt is receiving vancomycin 750 mg every 24 hours.  Vancomycin trough from 02/02 at 2025 was 5.2 mg/dL.  Target trough range is 15-20 mg/dL for pneumonia.   Trough was drawn about 2 hours early and anticipate it is sub/therapeutic. Pharmacy will increase regimen to a vancomycin dose of 500 mg every 12 hours. A vancomycin trough has been ordered prior to 4th dose due 02/04 at 0830.      Patient will be followed by pharmacy for changes in renal function, toxicity, and efficacy.  Thank you for allowing us to participate in this patient's care.     Nabeel Cano, JerilynD

## 2017-02-03 NOTE — PLAN OF CARE
Problem: Patient Care Overview  Goal: Plan of Care Review  Outcome: Ongoing (interventions implemented as appropriate)  Pt  denies shortness of breath. Receiving an assortment of iv antibiotics. Tolerating Iv rto right forearm. No redness, swelling, or tenderness. Safety maintained.  at bedside.

## 2017-02-03 NOTE — PT/OT/SLP EVAL
Physical Therapy  Evaluation    Aurelia Massey   MRN: [id number]   Admitting Diagnosis: HCAP (healthcare-associated pneumonia)    PT Received On: 17  PT Start Time: 0952     PT Stop Time: 1015    PT Total Time (min): 23 min       Billable Minutes:  Evaluation 10 and Gait [id number]    Diagnosis: HCAP (healthcare-associated pneumonia)      Past Medical History   Diagnosis Date    Acute ITP     Cancer      liver first round chemo 2017       Past Surgical History   Procedure Laterality Date    Splenectomy, total         Referring physician:   Date referred to PT: 2017    General Precautions: Standard,    Orthopedic Precautions: N/A   Braces:              Patient History:  Lives With: spouse  Living Arrangements: house  Equipment Currently Used at Home: none  DME owned (not currently used): none    Previous Level of Function:  Ambulation Skills: independent  Transfer Skills: independent  ADL Skills: independent    Subjective:  Communicated with nurse Hernandez prior to session.  Pt stated feeling better, my  will help me walk  Chief Complaint: no c/o pain  Patient goals: get home soon    Pain Ratin/10                    Objective:   Patient found with: telemetry     Cognitive Exam:  Oriented to: Person, Place, Time and Situation    Follows Commands/attention: Follows multistep  commands  Communication: clear/fluent  Safety awareness/insight to disability: intact    Physical Exam:  Postural examination/scapula alignment: Rounded shoulder and Head forward    Skin integrity: Dry  Edema: None noted     Sensation:   Intact    Upper Extremity Range of Motion:  Right Upper Extremity: WFL  Left Upper Extremity: WFL    Upper Extremity Strength:  Right Upper Extremity: WFL  Left Upper Extremity: WFL    Lower Extremity Range of Motion:  Right Lower Extremity: WFL  Left Lower Extremity: WFL    Lower Extremity Strength:  Right Lower Extremity: 4-/5  Left Lower Extremity: 4-/5     Fine motor  coordination:      Gross motor coordination:     Functional Mobility:  Bed Mobility:  Rolling/Turning Right: Independent  Supine to Sit: Independent    Transfers:  Sit <> Stand Assistance: Supervision  Sit <> Stand Assistive Device: No Assistive Device  Bed <> Chair Technique: Stand Pivot  Bed <> Chair Assistance: Supervision  Bed <> Chair Assistive Device: No Assistive Device    Gait:   Gait Distance: 240ft 100ft  Assistance 1: Contact Guard Assistance  Gait Assistive Device: Hand held assist    Stairs:      Balance:   Static Sit: GOOD-: Takes MODERATE challenges from all directions but inconsistently  Dynamic Sit: GOOD-: Maintains balance through MODERATE excursions of active trunk movement,     Static Stand: FAIR+: Takes MINIMAL challenges from all directions  Dynamic stand: FAIR+: Needs CLOSE SUPERVISION during gait and is able to right self with minor LOB    Therapeutic Activities and Exercises:  Pt ambulated to hallways with RW then HHA with no loss of balance  To sink to brush teeth with Supervision  OOB to chair with pillow behind back  Pt encouraged ankle pumps and frequent ambulation with spouse    AM-PAC 6 CLICK MOBILITY  How much help from another person does this patient currently need?   1 = Unable, Total/Dependent Assistance  2 = A lot, Maximum/Moderate Assistance  3 = A little, Minimum/Contact Guard/Supervision  4 = None, Modified [place]/Independent          AM-PAC Raw Score CMS G-Code Modifier Level of Impairment Assistance   6 % Total / Unable   7 - 9 CM 80 - 100% Maximal Assist   10 - 14 CL 60 - 80% Moderate Assist   15 - 19 CK 40 - 60% Moderate Assist   20 - 22 CJ 20 - 40% Minimal Assist   23 CI 1-20% SBA / CGA   24 CH 0% Independent/ Mod I     Patient left up in chair with all lines intact, call button in reach and spouse present.    Assessment:   Aurelia Massey is a 71 y.o. female with a medical diagnosis of HCAP (healthcare-associated pneumonia) and presents with   Functional mobility and did well ambulating to hallways. Pt was indep, drives and home with spouse- no acute PT needs.    Rehab identified problem list/impairments:      Rehab potential is fair.    Activity tolerance: Fair    Discharge recommendations: Discharge Facility/Level Of Care Needs: home with home health     Barriers to discharge:      Equipment recommendations: Equipment Needed After Discharge: none     GOALS:   Physical Therapy Goals     Not on file          PLAN:    Patient to be seen  (eval and treat- no acute PT needs) to address the above listed problems via    Plan of Care expires: 02/03/17  Plan of Care reviewed with: patient, spouse          Kristal Munoz, PT  02/03/2017

## 2017-02-03 NOTE — PROGRESS NOTES
"Progress Note  Hospital Medicine  Patient Name:Aurelia Massey  MRN:  [id number]  Patient Class: IP- Inpatient  Admit Date: 1/31/2017  Length of Stay: 3 days  Expected Discharge Date:   Attending Physician: Epi Harris MD  Primary Care Provider:  SADIA Whitehead MD    SUBJECTIVE:     Principal Problem: HCAP (healthcare-associated pneumonia)  Initial history of present illness:Aurelia Massey is a 71 y.o. 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 She was admitted to the service of hospital medicine with HCAP and severe sepsis. She reported to ED with complaint of sudden onset sternal CP that began approximately 1 hour PTA. She states the pain was a pressure-like, severe pain "unlike anything I've ever had" with radiation to the upper RIGHT chest wall and was accompanied by mild SOB. She reported the pain worsened with inspiration, did not respond to oral home pain medication, and was eventually alleviated with morphine in the ED. She denies any cough, does endorse fever and chills. She had her first chemotherapy treatment Monday and is under the care of Dr. Lamonte Whitehead. She called his office prior to coming to ED and was told to report for emergency evaluation. She denies any nausea, vomiting, or abdominal pain at present.     PMH/PSH/SH/FH/Meds: reviewed.    Symptoms/Review of Systems: Feeling a little stronger. Denied complaints. No shortness of breath, cough, chest pain or headache, fever or abdominal pain. Last night patient developed very failnt posterior back rash which resolved and no itching reported.  Diet:  Adequate intake.    Activity level: Normal.    Pain:  Patient reports no pain.       OBJECTIVE:   Vital Signs (Most Recent):      Temp: 97.1 °F (36.2 °C) (02/03/17 0730)  Pulse: 88 (02/03/17 0730)  Resp: 16 (02/03/17 0730)  BP: 126/73 (02/03/17 0730)  SpO2: 99 % (02/03/17 0730)       Vital " Signs Range (Last 24H):  Temp:  [97.1 °F (36.2 °C)-98.4 °F (36.9 °C)]   Pulse:  []   Resp:  [12-41]   BP: (101-147)/(68-78)   SpO2:  [95 %-100 %]     Weight: 49.4 kg (108 lb 14.5 oz)  Body mass index is 19.29 kg/(m^2).    Intake/Output Summary (Last 24 hours) at 02/03/17 1212  Last data filed at 02/02/17 1532   Gross per 24 hour   Intake              100 ml   Output              450 ml   Net             -350 ml     Physical Examination:  Constitutional: She is oriented to person, place, and time. She appears well-developed and well-nourished. No distress.   HENT:   Head: Normocephalic and atraumatic.   Eyes: Conjunctivae and EOM are normal. Pupils are equal, round, and reactive to light. Right eye exhibits no discharge. Left eye exhibits no discharge.   Neck: Normal range of motion. Neck supple. No thyromegaly present.   Cardiovascular: Regular rhythm, normal heart sounds and intact distal pulses.   Tachycardiac--103   Pulmonary/Chest: She has no wheezes. She exhibits no tenderness.   Bibasal rales.  Abdominal: Soft. Bowel sounds are normal. She exhibits no distension. There is no tenderness. There is no guarding.   Musculoskeletal: Normal range of motion. She exhibits no edema or tenderness.   Neurological: She is alert and oriented to person, place, and time.   Skin: Skin is warm and dry. No erythema. No pallor.   Psychiatric: She has a normal mood and affect. Her behavior is normal. Judgment and thought content normal.     CBC:    Recent Labs  Lab 02/01/17 0439 02/02/17 0321 02/03/17 0417   WBC 18.90* 15.60* 7.10   RBC 3.23* 3.23* 3.27*   HGB 10.0* 10.1* 10.1*   HCT 30.5* 30.5* 30.8*    245 242   MCV 95 94 94   MCH 31.0 31.1* 31.0   MCHC 32.8 32.9 33.0   BMP    Recent Labs  Lab 02/01/17  0439 02/02/17  0321 02/03/17 0417   GLU 98 118* 108    138 139   K 3.8 3.0* 3.4*    110 111*   CO2 20* 20* 20*   BUN 10 7* 8   CREATININE 0.8 0.7 0.6   CALCIUM 8.6* 8.5* 8.5*      Diagnostic  Results:  Microbiology Results (last 7 days)     Procedure Component Value Units Date/Time    Culture, Respiratory with Gram Stain [[id number]] Collected:  02/03/17 0832    Order Status:  Sent Specimen:  Respiratory from Sputum Updated:  02/03/17 0833    Blood culture [[id number]] Collected:  01/31/17 1753    Order Status:  Completed Specimen:  Blood Updated:  02/02/17 2212     Blood Culture, Routine No Growth to date     Blood Culture, Routine No Growth to date     Blood Culture, Routine No Growth to date    Blood culture [[id number]] Collected:  01/31/17 1759    Order Status:  Completed Specimen:  Blood Updated:  02/02/17 2212     Blood Culture, Routine No Growth to date     Blood Culture, Routine No Growth to date     Blood Culture, Routine No Growth to date    Urine culture [[id number]] Collected:  01/31/17 2235    Order Status:  Completed Specimen:  Urine from Urine, Clean Catch Updated:  02/02/17 1055     Urine Culture, Routine No growth         Significant Imaging: CTA chest: No CTA evidence of pulmonary embolus.  Small reticular nodular infiltrate in the right upper lobe laterally may represent an infectious process or neoplasm.  2 other soft tissue density nodules in the lung fields likely represent metastasis.  Large liver metastases are identified.  Prior granulomatous disease.      CXR: The mediastinal and cardiac size and contours are normal.  No intrapulmonary masses or infiltrates are seen. No pneumothorax is identified.    CXR: Negative portable chest.     Assessment/Plan:     HCAP (healthcare-associated pneumonia)  CTA chest indicative of nodular infiltrate in RUL possibly infectious vs. Neoplasm. Given immunocompromised state s/p splenectomy and recent chemotherapy-- will treat patient with IV Vanc & Zosyn. Consult pulmonologist for evaluation. Supplemental O2, nebulizer therapy. Check sputum culture.  Continue avelox.      Septic shock - Improving  Heplock IVF  Vanc/Zosyn/Avelox for HCAP as above.  Follow blood cultures. Lactate has normalized.     Chest pain  Likely related to PNA. Will trend troponin, monitor on telemetry.    Hypokalemia  Replete KCl. Follow BMP.     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 Follow Dr. Lamonte Samaniego's recommendations.     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    Discussed with significant other.  VTE Risk Mitigation         Ordered     Place sequential compression device  Until discontinued      02/01/17 1820     enoxaparin injection 40 mg  Daily     Route:  Subcutaneous        01/31/17 2038        Epi Harris MD  Department of Hospital Medicine   [facility]

## 2017-02-03 NOTE — CONSULTS
Aurelia Massey [id number] is a 71 y.o. female who has been consulted for vancomycin dosing.    The patient has the following labs:     Date Creatinine (mg/dl)    BUN WBC Count   2/3/2017 Estimated Creatinine Clearance: 56.7 mL/min (based on Cr of 0.7). Lab Results   Component Value Date    BUN 7 (L) 02/02/2017     Lab Results   Component Value Date    WBC 15.60 (H) 02/02/2017        Current weight is 49.4 kg (108 lb 14.5 oz)    Vancomycin trough has been changed to 2/4/17 at 1500.    Target goal is 15-20 mg/dL.     Patient will be followed by pharmacy for changes in renal function, toxicity, and efficacy.    Thank you for allowing us to participate in this patient's care.     Oneal Hayden RPH

## 2017-02-03 NOTE — PLAN OF CARE
Problem: Patient Care Overview  Goal: Plan of Care Review  Outcome: Ongoing (interventions implemented as appropriate)  Rivas aerosol tx well BBS decreased room air sats 96%

## 2017-02-04 VITALS
BODY MASS INDEX: 19.3 KG/M2 | RESPIRATION RATE: 18 BRPM | WEIGHT: 108.94 LBS | OXYGEN SATURATION: 99 % | HEIGHT: 63 IN | DIASTOLIC BLOOD PRESSURE: 77 MMHG | SYSTOLIC BLOOD PRESSURE: 130 MMHG | TEMPERATURE: 99 F | HEART RATE: 86 BPM

## 2017-02-04 PROBLEM — R07.9 CHEST PAIN: Status: RESOLVED | Noted: 2017-01-31 | Resolved: 2017-02-04

## 2017-02-04 PROBLEM — R65.21 SEPTIC SHOCK: Status: RESOLVED | Noted: 2017-01-31 | Resolved: 2017-02-04

## 2017-02-04 PROBLEM — E87.6 HYPOKALEMIA: Status: RESOLVED | Noted: 2017-02-02 | Resolved: 2017-02-04

## 2017-02-04 PROBLEM — A41.9 SEPTIC SHOCK: Status: RESOLVED | Noted: 2017-01-31 | Resolved: 2017-02-04

## 2017-02-04 LAB
ALBUMIN SERPL BCP-MCNC: 2.3 G/DL
ALP SERPL-CCNC: 100 U/L
ALT SERPL W/O P-5'-P-CCNC: 38 U/L
ANION GAP SERPL CALC-SCNC: 8 MMOL/L
AST SERPL-CCNC: 69 U/L
BASOPHILS # BLD AUTO: ABNORMAL K/UL
BASOPHILS NFR BLD: 0 %
BILIRUB SERPL-MCNC: 0.7 MG/DL
BUN SERPL-MCNC: 8 MG/DL
BURR CELLS BLD QL SMEAR: ABNORMAL
CALCIUM SERPL-MCNC: 8.8 MG/DL
CHLORIDE SERPL-SCNC: 109 MMOL/L
CO2 SERPL-SCNC: 23 MMOL/L
CREAT SERPL-MCNC: 0.7 MG/DL
DIFFERENTIAL METHOD: ABNORMAL
EOSINOPHIL # BLD AUTO: ABNORMAL K/UL
EOSINOPHIL NFR BLD: 4 %
ERYTHROCYTE [DISTWIDTH] IN BLOOD BY AUTOMATED COUNT: 13.7 %
EST. GFR  (AFRICAN AMERICAN): >60 ML/MIN/1.73 M^2
EST. GFR  (NON AFRICAN AMERICAN): >60 ML/MIN/1.73 M^2
GLUCOSE SERPL-MCNC: 105 MG/DL
HCT VFR BLD AUTO: 32.9 %
HGB BLD-MCNC: 10.7 G/DL
LYMPHOCYTES # BLD AUTO: ABNORMAL K/UL
LYMPHOCYTES NFR BLD: 42 %
MCH RBC QN AUTO: 30.7 PG
MCHC RBC AUTO-ENTMCNC: 32.6 %
MCV RBC AUTO: 94 FL
MONOCYTES # BLD AUTO: ABNORMAL K/UL
MONOCYTES NFR BLD: 1 %
NEUTROPHILS NFR BLD: 53 %
PLATELET # BLD AUTO: 292 K/UL
PLATELET BLD QL SMEAR: ABNORMAL
PMV BLD AUTO: 8.9 FL
POTASSIUM SERPL-SCNC: 4 MMOL/L
PROT SERPL-MCNC: 5.6 G/DL
RBC # BLD AUTO: 3.48 M/UL
SODIUM SERPL-SCNC: 140 MMOL/L
TARGETS BLD QL SMEAR: ABNORMAL
WBC # BLD AUTO: 2.7 K/UL

## 2017-02-04 PROCEDURE — 25000003 PHARM REV CODE 250: Performed by: INTERNAL MEDICINE

## 2017-02-04 PROCEDURE — 25000003 PHARM REV CODE 250: Performed by: EMERGENCY MEDICINE

## 2017-02-04 PROCEDURE — 99900035 HC TECH TIME PER 15 MIN (STAT)

## 2017-02-04 PROCEDURE — 63600175 PHARM REV CODE 636 W HCPCS: Performed by: NURSE PRACTITIONER

## 2017-02-04 PROCEDURE — 36415 COLL VENOUS BLD VENIPUNCTURE: CPT

## 2017-02-04 PROCEDURE — 85007 BL SMEAR W/DIFF WBC COUNT: CPT

## 2017-02-04 PROCEDURE — 25000242 PHARM REV CODE 250 ALT 637 W/ HCPCS: Performed by: NURSE PRACTITIONER

## 2017-02-04 PROCEDURE — 80053 COMPREHEN METABOLIC PANEL: CPT

## 2017-02-04 PROCEDURE — 99232 SBSQ HOSP IP/OBS MODERATE 35: CPT | Mod: ,,, | Performed by: INTERNAL MEDICINE

## 2017-02-04 PROCEDURE — 94640 AIRWAY INHALATION TREATMENT: CPT

## 2017-02-04 PROCEDURE — 85027 COMPLETE CBC AUTOMATED: CPT

## 2017-02-04 PROCEDURE — 94761 N-INVAS EAR/PLS OXIMETRY MLT: CPT

## 2017-02-04 RX ORDER — LEVOFLOXACIN 500 MG/1
500 TABLET, FILM COATED ORAL DAILY
Qty: 7 TABLET | Refills: 0 | Status: SHIPPED | OUTPATIENT
Start: 2017-02-05 | End: 2017-02-12

## 2017-02-04 RX ADMIN — PIPERACILLIN SODIUM AND TAZOBACTAM SODIUM 4.5 G: 4; .5 INJECTION, POWDER, FOR SOLUTION INTRAVENOUS at 10:02

## 2017-02-04 RX ADMIN — IPRATROPIUM BROMIDE AND ALBUTEROL SULFATE 3 ML: .5; 3 SOLUTION RESPIRATORY (INHALATION) at 12:02

## 2017-02-04 RX ADMIN — ONDANSETRON 4 MG: 2 INJECTION INTRAMUSCULAR; INTRAVENOUS at 07:02

## 2017-02-04 RX ADMIN — PANTOPRAZOLE SODIUM 40 MG: 40 TABLET, DELAYED RELEASE ORAL at 07:02

## 2017-02-04 NOTE — CONSULTS
Aurelia Massey [id number] is a 71 y.o. female who has been consulted for vancomycin dosing.    The patient has the following labs:     Date Creatinine (mg/dl)    BUN WBC Count   2/4/2017 Estimated Creatinine Clearance: 56.7 mL/min (based on Cr of 0.7). Lab Results   Component Value Date    BUN 8 02/04/2017     Lab Results   Component Value Date    WBC 2.70 (L) 02/04/2017        Current weight is 49.4 kg (108 lb 14.5 oz)    Pt was receiving vancomycin 500mg q12h. Due to compatibility issues, vancomycin was retimed and next dose rescheduled for >12hours after previous. Due to concern for low serum levels will restart vancomycin at a dose of 750mg (15mg/kg) q12h.The vancomycin trough has been ordered for 2/5 at 1330.    Patient will be followed by pharmacy for changes in renal function, toxicity, and efficacy.    Thank you for allowing us to participate in this patient's care.    Leidy Chan, PharmD

## 2017-02-04 NOTE — PLAN OF CARE
Problem: Patient Care Overview  Goal: Plan of Care Review  Outcome: Ongoing (interventions implemented as appropriate)  POC reviewed with pt, understanding verbalized. Pt denies SOB. Pt complain of mild pain but denies medication.  at bedside. Pt tolerating IV antibiotics well. Bed in lowest position, wheels locked, call light within reach. Will continue to monitor.

## 2017-02-04 NOTE — PROGRESS NOTES
Progress Note  PULMONARY    Admit Date: 1/31/2017 2/4/2017        SUBJECTIVE:     Feb 2,Weak but feeling better, no c/o.   feb 3, better today but as expected weak.  Feb 4, feels better and asking to go home.  PFSH and Allergies reviewed.    OBJECTIVE:     Vitals (Most recent):  Vitals:    02/04/17 0740   BP: 128/81   Pulse: 93   Resp: 18   Temp: 97.5 °F (36.4 °C)       Vitals (24 hour range):  Temp:  [97.5 °F (36.4 °C)-98.3 °F (36.8 °C)]   Pulse:  [65-98]   Resp:  [16-18]   BP: (105-146)/(64-91)   SpO2:  [95 %-100 %]       Intake/Output Summary (Last 24 hours) at 02/04/17 1044  Last data filed at 02/04/17 0400   Gross per 24 hour   Intake             1200 ml   Output                0 ml   Net             1200 ml          Physical Exam:  The patient's neuro status (alertness,orientation,cognitive function,motor skills,), pharyngeal exam (oral lesions, hygiene, abn dentition,), Neck (jvd,mass,thyroid,nodes in neck and above/below clavicle),RESPIRATORY(symmetry,effort,fremitus,percussion,auscultation),  Cor(rhythm,heart tones including gallops,perfusion,edema)ABD(distention,hepatic&splenomegaly,tenderness,masses), Skin(rash,cyanosis),Psyc(affect,judgement,).  Exam negative except for these pertinent findings:    Alert and frail, no jvd or edema, good bs, soft abd.  Stable 2/4/2017  Up in chair    Diagnostic Results:  All labs last 4 entries of CBC,CMP/BMP,AGB, MICRO reviewed.  All rediographs of chest, lungs, & abd reviewed by direct vision last 3 days.      Results for JOSE ROLDNA (MRN [id number]) as of 2/4/2017 10:45   Ref. Range 2/4/2017 04:33   WBC Latest Ref Range: 3.90 - 12.70 K/uL 2.70 (L)   RBC Latest Ref Range: 4.00 - 5.40 M/uL 3.48 (L)   Hemoglobin Latest Ref Range: 12.0 - 16.0 g/dL 10.7 (L)   Hematocrit Latest Ref Range: 37.0 - 48.5 % 32.9 (L)   MCV Latest Ref Range: 82 - 98 fL 94   MCH Latest Ref Range: 27.0 - 31.0 pg 30.7   MCHC Latest Ref Range: 32.0 - 36.0 % 32.6   RDW Latest Ref Range: 11.5 -  14.5 % 13.7   Platelets Latest Ref Range: 150 - 350 K/uL 292   MPV Latest Ref Range: 9.2 - 12.9 fL 8.9 (L)   Gran% Latest Ref Range: 38.0 - 73.0 % 53.0   Lymph% Latest Ref Range: 18.0 - 48.0 % 42.0   Lymph # Latest Ref Range: 1.0 - 4.8 K/uL CANCELED   Mono% Latest Ref Range: 4.0 - 15.0 % 1.0 (L)   Mono # Latest Ref Range: 0.3 - 1.0 K/uL CANCELED   Eosinophil% Latest Ref Range: 0.0 - 8.0 % 4.0   Eos # Latest Ref Range: 0.0 - 0.5 K/uL CANCELED   Basophil% Latest Ref Range: 0.0 - 1.9 % 0.0   Baso # Latest Ref Range: 0.00 - 0.20 K/uL CANCELED   Platelet Estimate Unknown Appears normal   Dearborn Cells Unknown Occasional   Target Cells Unknown Occasional   Sodium Latest Ref Range: 136 - 145 mmol/L 140   Potassium Latest Ref Range: 3.5 - 5.1 mmol/L 4.0   Chloride Latest Ref Range: 95 - 110 mmol/L 109   CO2 Latest Ref Range: 23 - 29 mmol/L 23   Anion Gap Latest Ref Range: 8 - 16 mmol/L 8   BUN, Bld Latest Ref Range: 8 - 23 mg/dL 8   Creatinine Latest Ref Range: 0.5 - 1.4 mg/dL 0.7   eGFR if non African American Latest Ref Range: >60 mL/min/1.73 m^2 >60   eGFR if  Latest Ref Range: >60 mL/min/1.73 m^2 >60   Glucose Latest Ref Range: 70 - 110 mg/dL 105   Calcium Latest Ref Range: 8.7 - 10.5 mg/dL 8.8   Alkaline Phosphatase Latest Ref Range: 55 - 135 U/L 100   Total Protein Latest Ref Range: 6.0 - 8.4 g/dL 5.6 (L)   Albumin Latest Ref Range: 3.5 - 5.2 g/dL 2.3 (L)          cxr looks good feb 3, direct view    ASSESSMENT/PLAN:     Patient Active Problem List   Diagnosis    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      Consider abx for prn use/post chemo with h/o spleen resection and current sepsis.  Re check cxr , can go to floor.    Feb 3, doing better, no pos cultures, could go outpt 2/4 on 7 days levaquin,  Would give script for levaquin to start for any febrile illness with h/o spleen removal.      Dr Whitehead indicates chemo on hold.      Feb 4, pt feeling well, needs to be outpt and good performance to resume chemo.  levaquin should be adequate.  Will make resp rx prn as ppt nausea?                            .

## 2017-02-04 NOTE — DISCHARGE SUMMARY
"[facility]  Discharge Summary      Patient Name: Aurelia Massey  MRN: [id number]  Admission Date: 1/31/2017  Hospital Length of Stay: 4 days  Discharge Date and Time: 2/4/2017 4:12 PM  Attending Physician: No att. providers found   Discharging Provider: Windy Mora MD  Primary Care Provider: SADIA Whitehead MD      HPI:   Aurelia Massey is a 71 y.o. 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  She was admitted to the service of hospital medicine with HCAP and severe sepsis.  She reported to ED with complaint of sudden onset sternal CP that began approximately 1 hour PTA.  She states the pain was a pressure-like, severe pain "unlike anything I've ever had" with radiation to the upper RIGHT chest wall and was accompanied by mild SOB.  She reported the pain worsened with inspiration, did not respond to oral home pain medication, and was eventually alleviated with morphine in the ED.  She denies any cough, does endorse fever and chills.  She had her first chemotherapy treatment Monday and is under the care of Dr. Lamonte Whitehead.  She called his office prior to coming to ED and was told to report for emergency evaluation.  She denies any nausea, vomiting, or abdominal pain at present.  Other pertinent medical history as below:    * No surgery found *      Indwelling Lines/Drains at time of discharge:   Lines/Drains/Airways          No matching active lines, drains, or airways        Hospital Course:   Pt sent to ICU for symptoms of septic shock. IV vanc and zosyn given. Pulmonology consulted for management. After improvement she was transferred to the floor. She remained hemodynamically stable. Pulmonology found her stable for oral antibiotic treatment. Patient examined at bedside on day of discharge. Exam findings within normal limits. She was deemed safe for " discharge.         Consults:   Consults         Status Ordering Provider     Inpatient consult to Oncology  Once     Provider:  SADIA Whitehead MD    Completed SOLOMON CHRISTINE     Inpatient consult to Pulmonology  Once     Provider:  Gilson Aburto MD    Completed ANDRESITSOLOMON     IP consult to case management  Once     Provider:  (Not yet assigned)    Acknowledged ELSA BAUMAN     Pharmacy to dose Vancomycin consult  Once     Provider:  (Not yet assigned)    Acknowledged TOI STEVENSON          Significant Diagnostic Studies: Labs:   CMP   Recent Labs  Lab 02/03/17  0417 02/04/17  0433    140   K 3.4* 4.0   * 109   CO2 20* 23    105   BUN 8 8   CREATININE 0.6 0.7   CALCIUM 8.5* 8.8   PROT 5.5* 5.6*   ALBUMIN 2.1* 2.3*   BILITOT 0.7 0.7   ALKPHOS 105 100   AST 54* 69*   ALT 18 38   ANIONGAP 8 8   ESTGFRAFRICA >60 >60   EGFRNONAA >60 >60    and CBC   Recent Labs  Lab 02/03/17  0417 02/04/17  0433   WBC 7.10 2.70*   HGB 10.1* 10.7*   HCT 30.8* 32.9*    292       Pending Diagnostic Studies:     None        Final Active Diagnoses:    Diagnosis Date Noted POA    PRINCIPAL PROBLEM:  HCAP (healthcare-associated pneumonia) [J18.9] 01/31/2017 Yes    Rheumatoid lung [M05.10] 02/01/2017 Yes    Lung nodules [R91.8] 02/01/2017 Yes    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 Complete 7 days of levaquin at home. Follow up with pcp/ pulmonology.       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 Whitehead.      Rheumatoid lung        Lung nodules        Chest pain, resolved as of 2/4/2017  Likely related to PNA.  Will trend troponin, monitor on telemetry.      Septic shock, resolved as of 2/4/2017  Associated with tachycardia, leukocytosis, lactic acidosis, fever, and hypotension despite 30 cc/kg fluid bolus.  Will bolus 1L NS.  Transfer to ICU for intensive monitoring, central line placement, and vasopressor administration.  Vanc/Zosyn for HCAP as above.  Follow blood cultures. Lactate has normalized.      Hypokalemia, resolved as of 2/4/2017          Discharged Condition: good    Disposition: Home or Self Care    Follow Up:  Follow-up Information     Follow up with SADIA Whitehead MD In 3 days.    Specialty:  Hematology and Oncology    Contact information:    [address] [facility]  [address] [place] [id number]  [phone]          Patient Instructions:     Diet general     Activity as tolerated     Call MD for:  difficulty breathing or increased cough     Call MD for:  increased confusion or weakness       Medications:  Reconciled Home Medications:   Discharge Medication List as of 2/4/2017  1:07 PM      CONTINUE these medications which have CHANGED    Details   levoFLOXacin (LEVAQUIN) 500 MG tablet Take 1 tablet (500 mg total) by mouth once daily., Starting 2/5/2017, Until Sun 2/12/17, Normal         CONTINUE these medications which have NOT CHANGED    Details   ondansetron (ZOFRAN-ODT) 8 MG TbDL Take 4 mg by mouth every 6 (six) hours as needed (nausea)., Until Discontinued, Historical Med      oxycodone-acetaminophen (PERCOCET) 5-325 mg per tablet Take 1-2 tablets by mouth every 4 (four) hours as needed for Pain., Starting 1/27/2017, Until Discontinued, Print           Time spent on the discharge of patient: 34 minutes    Windy Mora MD  Department of Hospital  Medicine  [facility]

## 2017-02-04 NOTE — PLAN OF CARE
Problem: Patient Care Overview  Goal: Plan of Care Review  Outcome: Ongoing (interventions implemented as appropriate)  Pt AAO  Continent to bowel and bladder  Denies pain and discomfort at present time   present at the bedside  IV infusing with no adverse reactions  Pt up sitting in the chair next to the bed  Call light in reach   Will continue to monitor

## 2017-02-04 NOTE — PLAN OF CARE
Problem: Patient Care Overview  Goal: Plan of Care Review  Outcome: Ongoing (interventions implemented as appropriate)  Pt rec duoneb treatment at this time, states it makes her nauseated. 98% sats on room air with 87 bpm HR. Pt states she's breathing good.

## 2017-02-05 LAB
BACTERIA BLD CULT: NORMAL
BACTERIA BLD CULT: NORMAL
BACTERIA SPEC AEROBE CULT: NORMAL
GRAM STN SPEC: NORMAL

## 2017-02-07 NOTE — PHYSICIAN QUERY
"PT Name: Aurelia Massey  MR #: [id number]  Physician Query Form - Nutrition Clarification   Reviewer  Ext Catherine Borges [phone]  2/8 Withdrawn. JT  This form is a permanent document in the medical record.     Query Date: February 7, 2017  By submitting this query, we are merely seeking further clarification of documentation.. Please utilize your independent clinical judgment when addressing the question(s) below.    The Medical record reflects the following:   Indicators     Supporting Clinical Findings Location in Medical Record   X Wt/ BMI/ Usual Body Weight BMI 19 flowsheet   X Alb          TProt            Pre-Albumin Albumin 2.3  Total Protein 5.7    X Acute or Chronic illness Cholangiocarcinoma w/ mets to liver, septic shock d/t PNA, appetite is poor Dc sum, H&P    D. Whitehead consult    % of estimated energy intake over a time frame from p.o., TF or TPN      Weight status over a time frame      Subcutaneous fat and/or muscle loss      Reduced  strength      "Delayed wound healing:, "Failure to thrive"      "Fluid accumulation" or "Edema"      "Hypoalbuminemia"     X Other:  Received Chemo 1/30/2017 H&P, dc sum     AND / ASPEN Clinical Characteristics (October 2011)  A minimum of two characteristics is recommended for diagnosing either moderate or severe malnutrition    Mild Malnutrition Moderate Malnutrition Severe Malnutrition    Energy Intake from p.o., TF or TPN. < 75% intake of estimated energy needs for less than 7 days. < 75% intake of estimated energy needs for greater than 7 days. < 50% intake of estimated energy needs for > 5 days   Weight Loss 1-2% in 1 month  5% in 3 months  7.5% in 6 months  10% in one year 1-2 % in 1 week  5% in 1 month  7.5% in 3 months  10% in 6 months  20% in 1 year > 2% in 1 week  > 5% in 1 month  >7.5% in 3 months  >10% in 6 months  >20% in 1 year   Physical Findings None *Mild subcutaneous fat and/or muscle loss  *Mild fluid accumulation  *Stage II " decubitus  *Surgical wound or non-healing wound *Mod subcutaneous fat and/or muscle loss  *Mod/severe fluid accumulation  *Stage III or IV decubitus  *Non-healing surgical wound     Provider, please specify the diagnosis or diagnoses associated with above clinical findings.                                [ ] Mild Protein-Calorie Malnutrition  [ ] Moderate Protein-Calorie Malnutrition  [ ] Severe Protein-CalorieMalnutrition  [ ] Cachexia  [ ] Anorexia  [ ] Other Nutritional Diagnosis (Specify) ____________________________________  [ ] Clinically Undetermined    Please document in your progress notes daily for the duration of treatment, until resolved, and include in your discharge summary.

## 2017-02-09 LAB
ALBUMIN SERPL-MCNC: 3.3 G/DL (ref 3.1–4.7)
ALP SERPL-CCNC: 104 IU/L (ref 40–104)
ALT (SGPT): 51 IU/L (ref 3–33)
AST SERPL-CCNC: 61 IU/L (ref 10–40)
BASOPHILS NFR BLD: 0 K/UL (ref 0–0.2)
BASOPHILS NFR BLD: 0.5 %
BILIRUB SERPL-MCNC: 0.6 MG/DL (ref 0.3–1)
BUN SERPL-MCNC: 8 MG/DL (ref 8–20)
CALCIUM SERPL-MCNC: 9 MG/DL (ref 7.7–10.4)
CHLORIDE: 102 MMOL/L (ref 98–110)
CO2 SERPL-SCNC: 24.7 MMOL/L (ref 22.8–31.6)
CREATININE: 0.89 MG/DL (ref 0.6–1.4)
EOSINOPHIL NFR BLD: 0.1 K/UL (ref 0–0.7)
EOSINOPHIL NFR BLD: 1.8 %
ERYTHROCYTE [DISTWIDTH] IN BLOOD BY AUTOMATED COUNT: 14.5 % (ref 12.5–14.5)
GLUCOSE: 85 MG/DL (ref 70–99)
GRAN #: 0.5 K/UL (ref 1.4–6.5)
GRAN%: 13.3 %
HCT VFR BLD AUTO: 34.7 % (ref 36–48)
HGB BLD-MCNC: 11.3 G/DL (ref 12–15)
IMMATURE GRANS (ABS): 0 K/UL (ref 0–1)
IMMATURE GRANULOCYTES: 0.3 %
LYMPH #: 2.7 K/UL (ref 1.2–3.4)
LYMPH%: 71.2 %
MCH RBC QN AUTO: 30.7 PG (ref 25–35)
MCHC RBC AUTO-ENTMCNC: 32.6 G/DL (ref 31–36)
MCV RBC AUTO: 94.3 FL (ref 79–98)
MONO #: 0.5 K/UL (ref 0.1–0.6)
MONO%: 12.9 %
NUCLEATED RBCS: 0 %
NUCLEATED RED BLOOD CELLS: 0 /100 WBC
PERFORMED BY:: ABNORMAL
PLATELET # BLD AUTO: 409 K/UL (ref 140–440)
PMV BLD AUTO: 9.9 FL (ref 7.4–10.4)
POTASSIUM SERPL-SCNC: 3.8 MMOL/L (ref 3.5–5)
PROT SERPL-MCNC: 6.9 G/DL (ref 6–8.2)
RBC # BLD AUTO: 3.68 M/UL (ref 3.5–5.5)
SODIUM: 135 MMOL/L (ref 134–144)
WBC # BLD: 3.8 K/UL (ref 5–10)

## 2017-02-16 LAB
ALBUMIN SERPL-MCNC: 3.4 G/DL (ref 3.1–4.7)
ALP SERPL-CCNC: 109 IU/L (ref 40–104)
ALT (SGPT): 31 IU/L (ref 3–33)
AST SERPL-CCNC: 48 IU/L (ref 10–40)
BASOPHILS NFR BLD: 0.1 K/UL (ref 0–0.2)
BASOPHILS NFR BLD: 1.8 %
BILIRUB SERPL-MCNC: 0.4 MG/DL (ref 0.3–1)
BUN SERPL-MCNC: 12 MG/DL (ref 8–20)
CALCIUM SERPL-MCNC: 8.9 MG/DL (ref 7.7–10.4)
CHLORIDE: 101 MMOL/L (ref 98–110)
CO2 SERPL-SCNC: 24.7 MMOL/L (ref 22.8–31.6)
CREATININE: 0.78 MG/DL (ref 0.6–1.4)
EOSINOPHIL NFR BLD: 0.5 K/UL (ref 0–0.7)
EOSINOPHIL NFR BLD: 6.5 %
ERYTHROCYTE [DISTWIDTH] IN BLOOD BY AUTOMATED COUNT: 16.2 % (ref 12.5–14.5)
GLUCOSE: 95 MG/DL (ref 70–99)
GRAN #: 2.1 K/UL (ref 1.4–6.5)
GRAN%: 29.1 %
HCT VFR BLD AUTO: 33.5 % (ref 36–48)
HGB BLD-MCNC: 11.2 G/DL (ref 12–15)
IMMATURE GRANS (ABS): 0 K/UL (ref 0–1)
IMMATURE GRANULOCYTES: 0.3 %
LYMPH #: 3.6 K/UL (ref 1.2–3.4)
LYMPH%: 48.8 %
MCH RBC QN AUTO: 32.3 PG (ref 25–35)
MCHC RBC AUTO-ENTMCNC: 33.4 G/DL (ref 31–36)
MCV RBC AUTO: 96.5 FL (ref 79–98)
MONO #: 1 K/UL (ref 0.1–0.6)
MONO%: 13.5 %
NUCLEATED RBCS: 0 %
NUCLEATED RED BLOOD CELLS: 0 /100 WBC
PERFORMED BY:: ABNORMAL
PLATELET # BLD AUTO: 605 K/UL (ref 140–440)
PMV BLD AUTO: 9.3 FL (ref 8.8–12.7)
POTASSIUM SERPL-SCNC: 4.3 MMOL/L (ref 3.5–5)
PROT SERPL-MCNC: 6.6 G/DL (ref 6–8.2)
RBC # BLD AUTO: 3.47 M/UL (ref 3.5–5.5)
SODIUM: 136 MMOL/L (ref 134–144)
WBC # BLD: 7.4 K/UL (ref 5–10)

## 2017-02-22 ENCOUNTER — HOSPITAL ENCOUNTER (OUTPATIENT)
Dept: PREADMISSION TESTING | Facility: HOSPITAL | Age: 72
Discharge: HOME | End: 2017-02-22
Attending: SURGERY
Payer: MEDICARE

## 2017-02-22 ENCOUNTER — OFFICE VISIT (OUTPATIENT)
Dept: SURGERY | Facility: CLINIC | Age: 72
End: 2017-02-22
Payer: MEDICARE

## 2017-02-22 VITALS — BODY MASS INDEX: 18.25 KG/M2 | WEIGHT: 103 LBS | HEIGHT: 63 IN

## 2017-02-22 DIAGNOSIS — I87.2 VENOUS INSUFFICIENCY, PERIPHERAL: Primary | ICD-10-CM

## 2017-02-22 PROCEDURE — 99900103 DSU ONLY-NO CHARGE-INITIAL HR (STAT)

## 2017-02-22 PROCEDURE — 99999 PR PBB SHADOW E&M-EST. PATIENT-LVL III: CPT | Mod: PBBFAC,,, | Performed by: SURGERY

## 2017-02-22 PROCEDURE — 1159F MED LIST DOCD IN RCRD: CPT | Mod: S$GLB,,, | Performed by: SURGERY

## 2017-02-22 PROCEDURE — 1160F RVW MEDS BY RX/DR IN RCRD: CPT | Mod: S$GLB,,, | Performed by: SURGERY

## 2017-02-22 PROCEDURE — 1126F AMNT PAIN NOTED NONE PRSNT: CPT | Mod: S$GLB,,, | Performed by: SURGERY

## 2017-02-22 PROCEDURE — 99900104 DSU ONLY-NO CHARGE-EA ADD'L HR (STAT)

## 2017-02-22 PROCEDURE — 1157F ADVNC CARE PLAN IN RCRD: CPT | Mod: S$GLB,,, | Performed by: SURGERY

## 2017-02-22 PROCEDURE — 99214 OFFICE O/P EST MOD 30 MIN: CPT | Mod: S$GLB,,, | Performed by: SURGERY

## 2017-02-22 RX ORDER — SODIUM CHLORIDE 9 MG/ML
INJECTION, SOLUTION INTRAVENOUS CONTINUOUS
Status: CANCELLED | OUTPATIENT
Start: 2017-02-24

## 2017-02-22 NOTE — PROGRESS NOTES
Subjective:       Patient ID: Aurelia Massey is a 71 y.o. female.    Chief Complaint: Consult (needs port placement)    HPI Comments: Referred by Dr. Lamonte Whitehead for port.    Patient with diagnosis of metastatic cholangiocarcinoma has received 2 rounds of chemotherapy and is in need of venous access.  No spontaneous complaints except weight loss.     Review of Systems   Constitutional: Positive for appetite change and unexpected weight change. Negative for chills, diaphoresis, fatigue and fever.   HENT: Negative for hearing loss, sore throat, trouble swallowing and voice change.    Eyes: Negative for visual disturbance.   Respiratory: Negative for cough, shortness of breath and wheezing.    Cardiovascular: Negative for chest pain, palpitations and leg swelling.   Gastrointestinal: Negative for abdominal distention, abdominal pain, anal bleeding, blood in stool, constipation, diarrhea, nausea, rectal pain and vomiting.   Genitourinary: Negative for difficulty urinating, dysuria, flank pain, frequency, hematuria, menstrual problem and urgency.   Musculoskeletal: Negative for arthralgias, back pain, joint swelling, myalgias and neck pain.   Skin: Negative for pallor and rash.   Neurological: Negative for dizziness, syncope, weakness and headaches.   Hematological: Negative for adenopathy. Does not bruise/bleed easily.   Psychiatric/Behavioral: Negative for suicidal ideas. The patient is not nervous/anxious.        Objective:      Physical Exam   Constitutional: She is oriented to person, place, and time. She appears well-developed and well-nourished. No distress.   HENT:   Head: Normocephalic and atraumatic.   Right Ear: External ear normal.   Left Ear: External ear normal.   Eyes: Conjunctivae are normal. Pupils are equal, round, and reactive to light. Right eye exhibits no discharge. Left eye exhibits no discharge.   Neck: No tracheal deviation present. No thyromegaly present.   Cardiovascular: Normal rate  and regular rhythm.    Pulmonary/Chest: Effort normal. No respiratory distress.   Musculoskeletal: She exhibits no edema or tenderness.   Lymphadenopathy:     She has no cervical adenopathy.   Neurological: She is alert and oriented to person, place, and time. No cranial nerve deficit.   Skin: Skin is warm and dry. No rash noted. She is not diaphoretic. No pallor.   Psychiatric: She has a normal mood and affect. Her behavior is normal. Judgment and thought content normal.       Assessment:       1. Venous insufficiency, peripheral        Plan:       For OP port on 2/24/17.  All aspects of procedure including risks and possible complications were discussed in detail with the patient who agrees to proceed with procedure.  All questions were answered and consent was obtained. Preop orders were written.

## 2017-02-23 ENCOUNTER — ANESTHESIA EVENT (OUTPATIENT)
Dept: SURGERY | Facility: HOSPITAL | Age: 72
End: 2017-02-23
Payer: MEDICARE

## 2017-02-24 ENCOUNTER — ANESTHESIA (OUTPATIENT)
Dept: SURGERY | Facility: HOSPITAL | Age: 72
End: 2017-02-24
Payer: MEDICARE

## 2017-02-24 ENCOUNTER — SURGERY (OUTPATIENT)
Age: 72
End: 2017-02-24

## 2017-02-24 ENCOUNTER — HOSPITAL ENCOUNTER (OUTPATIENT)
Facility: HOSPITAL | Age: 72
Discharge: HOME | End: 2017-02-24
Attending: SURGERY | Admitting: SURGERY
Payer: MEDICARE

## 2017-02-24 DIAGNOSIS — I87.2 VENOUS INSUFFICIENCY, PERIPHERAL: ICD-10-CM

## 2017-02-24 LAB
ALBUMIN SERPL BCP-MCNC: 2.8 G/DL
ALP SERPL-CCNC: 100 U/L
ALT SERPL W/O P-5'-P-CCNC: 21 U/L
ANION GAP SERPL CALC-SCNC: 9 MMOL/L
ANISOCYTOSIS BLD QL SMEAR: SLIGHT
AST SERPL-CCNC: 34 U/L
BASOPHILS NFR BLD: 1 %
BILIRUB SERPL-MCNC: 0.5 MG/DL
BUN SERPL-MCNC: 7 MG/DL
CALCIUM SERPL-MCNC: 9.1 MG/DL
CHLORIDE SERPL-SCNC: 102 MMOL/L
CO2 SERPL-SCNC: 20 MMOL/L
CREAT SERPL-MCNC: 0.8 MG/DL
DIFFERENTIAL METHOD: ABNORMAL
EOSINOPHIL NFR BLD: 0 %
ERYTHROCYTE [DISTWIDTH] IN BLOOD BY AUTOMATED COUNT: 15.9 %
EST. GFR  (AFRICAN AMERICAN): >60 ML/MIN/1.73 M^2
EST. GFR  (NON AFRICAN AMERICAN): >60 ML/MIN/1.73 M^2
GLUCOSE SERPL-MCNC: 118 MG/DL
HCT VFR BLD AUTO: 33.5 %
HGB BLD-MCNC: 11 G/DL
LYMPHOCYTES NFR BLD: 5 %
MCH RBC QN AUTO: 31.4 PG
MCHC RBC AUTO-ENTMCNC: 32.8 %
MCV RBC AUTO: 96 FL
MONOCYTES NFR BLD: 0 %
NEUTROPHILS NFR BLD: 94 %
PLATELET # BLD AUTO: 344 K/UL
PLATELET BLD QL SMEAR: ABNORMAL
PMV BLD AUTO: 8.8 FL
POTASSIUM SERPL-SCNC: 4.6 MMOL/L
PROT SERPL-MCNC: 6.2 G/DL
RBC # BLD AUTO: 3.51 M/UL
SODIUM SERPL-SCNC: 131 MMOL/L
WBC # BLD AUTO: 6.2 K/UL

## 2017-02-24 PROCEDURE — 36561 INSERT TUNNELED CV CATH: CPT | Mod: ,,, | Performed by: SURGERY

## 2017-02-24 PROCEDURE — 99900104 DSU ONLY-NO CHARGE-EA ADD'L HR (STAT): Performed by: SURGERY

## 2017-02-24 PROCEDURE — 85007 BL SMEAR W/DIFF WBC COUNT: CPT

## 2017-02-24 PROCEDURE — 71000033 HC RECOVERY, INTIAL HOUR: Performed by: SURGERY

## 2017-02-24 PROCEDURE — 77001 FLUOROGUIDE FOR VEIN DEVICE: CPT | Mod: 26,,, | Performed by: SURGERY

## 2017-02-24 PROCEDURE — D9220A PRA ANESTHESIA: Mod: CRNA,,, | Performed by: NURSE ANESTHETIST, CERTIFIED REGISTERED

## 2017-02-24 PROCEDURE — 99900103 DSU ONLY-NO CHARGE-INITIAL HR (STAT): Performed by: SURGERY

## 2017-02-24 PROCEDURE — 63600175 PHARM REV CODE 636 W HCPCS: Performed by: SURGERY

## 2017-02-24 PROCEDURE — 80053 COMPREHEN METABOLIC PANEL: CPT

## 2017-02-24 PROCEDURE — 85027 COMPLETE CBC AUTOMATED: CPT

## 2017-02-24 PROCEDURE — 63600175 PHARM REV CODE 636 W HCPCS: Performed by: NURSE ANESTHETIST, CERTIFIED REGISTERED

## 2017-02-24 PROCEDURE — C1788 PORT, INDWELLING, IMP: HCPCS | Performed by: SURGERY

## 2017-02-24 PROCEDURE — 36000707: Performed by: SURGERY

## 2017-02-24 PROCEDURE — 71000015 HC POSTOP RECOV 1ST HR: Performed by: SURGERY

## 2017-02-24 PROCEDURE — 71000016 HC POSTOP RECOV ADDL HR: Performed by: SURGERY

## 2017-02-24 PROCEDURE — 36000706: Performed by: SURGERY

## 2017-02-24 PROCEDURE — 36415 COLL VENOUS BLD VENIPUNCTURE: CPT

## 2017-02-24 PROCEDURE — 37000009 HC ANESTHESIA EA ADD 15 MINS: Performed by: SURGERY

## 2017-02-24 PROCEDURE — 25000003 PHARM REV CODE 250: Performed by: ANESTHESIOLOGY

## 2017-02-24 PROCEDURE — 27200651 HC AIRWAY, LMA: Performed by: NURSE ANESTHETIST, CERTIFIED REGISTERED

## 2017-02-24 PROCEDURE — D9220A PRA ANESTHESIA: Mod: ANES,,, | Performed by: ANESTHESIOLOGY

## 2017-02-24 PROCEDURE — 25000003 PHARM REV CODE 250: Performed by: SURGERY

## 2017-02-24 PROCEDURE — 25000003 PHARM REV CODE 250: Performed by: NURSE ANESTHETIST, CERTIFIED REGISTERED

## 2017-02-24 PROCEDURE — 37000008 HC ANESTHESIA 1ST 15 MINUTES: Performed by: SURGERY

## 2017-02-24 DEVICE — PORT POWER MRI 8FR: Type: IMPLANTABLE DEVICE | Site: CHEST | Status: FUNCTIONAL

## 2017-02-24 RX ORDER — ONDANSETRON 2 MG/ML
INJECTION INTRAMUSCULAR; INTRAVENOUS
Status: DISCONTINUED | OUTPATIENT
Start: 2017-02-24 | End: 2017-02-24

## 2017-02-24 RX ORDER — BUPIVACAINE HYDROCHLORIDE AND EPINEPHRINE 2.5; 5 MG/ML; UG/ML
INJECTION, SOLUTION EPIDURAL; INFILTRATION; INTRACAUDAL; PERINEURAL
Status: DISCONTINUED | OUTPATIENT
Start: 2017-02-24 | End: 2017-02-24 | Stop reason: HOSPADM

## 2017-02-24 RX ORDER — FENTANYL CITRATE 50 UG/ML
25 INJECTION, SOLUTION INTRAMUSCULAR; INTRAVENOUS EVERY 5 MIN PRN
Status: DISCONTINUED | OUTPATIENT
Start: 2017-02-24 | End: 2017-02-24 | Stop reason: HOSPADM

## 2017-02-24 RX ORDER — HEPARIN 100 UNIT/ML
SYRINGE INTRAVENOUS
Status: DISCONTINUED | OUTPATIENT
Start: 2017-02-24 | End: 2017-02-24 | Stop reason: HOSPADM

## 2017-02-24 RX ORDER — FENTANYL CITRATE 50 UG/ML
INJECTION, SOLUTION INTRAMUSCULAR; INTRAVENOUS
Status: DISCONTINUED | OUTPATIENT
Start: 2017-02-24 | End: 2017-02-24

## 2017-02-24 RX ORDER — FERROUS SULFATE, DRIED 160(50) MG
2 TABLET, EXTENDED RELEASE ORAL EVERY OTHER DAY
COMMUNITY

## 2017-02-24 RX ORDER — SODIUM CHLORIDE 0.9 % (FLUSH) 0.9 %
3 SYRINGE (ML) INJECTION
Status: DISCONTINUED | OUTPATIENT
Start: 2017-02-24 | End: 2017-02-24 | Stop reason: HOSPADM

## 2017-02-24 RX ORDER — PHENYLEPHRINE HYDROCHLORIDE 10 MG/ML
INJECTION INTRAVENOUS
Status: DISCONTINUED | OUTPATIENT
Start: 2017-02-24 | End: 2017-02-24

## 2017-02-24 RX ORDER — HYDROMORPHONE HYDROCHLORIDE 2 MG/ML
1 INJECTION, SOLUTION INTRAMUSCULAR; INTRAVENOUS; SUBCUTANEOUS EVERY 4 HOURS PRN
Status: DISCONTINUED | OUTPATIENT
Start: 2017-02-24 | End: 2017-02-24 | Stop reason: HOSPADM

## 2017-02-24 RX ORDER — LIDOCAINE HYDROCHLORIDE 10 MG/ML
1 INJECTION, SOLUTION EPIDURAL; INFILTRATION; INTRACAUDAL; PERINEURAL ONCE
Status: COMPLETED | OUTPATIENT
Start: 2017-02-24 | End: 2017-02-24

## 2017-02-24 RX ORDER — CEFAZOLIN SODIUM 2 G/50ML
2 SOLUTION INTRAVENOUS
Status: COMPLETED | OUTPATIENT
Start: 2017-02-24 | End: 2017-02-24

## 2017-02-24 RX ORDER — SODIUM CHLORIDE 9 MG/ML
INJECTION, SOLUTION INTRAVENOUS CONTINUOUS
Status: DISCONTINUED | OUTPATIENT
Start: 2017-02-24 | End: 2017-02-24 | Stop reason: HOSPADM

## 2017-02-24 RX ORDER — LIDOCAINE HCL/PF 100 MG/5ML
SYRINGE (ML) INTRAVENOUS
Status: DISCONTINUED | OUTPATIENT
Start: 2017-02-24 | End: 2017-02-24

## 2017-02-24 RX ORDER — SODIUM CHLORIDE, SODIUM LACTATE, POTASSIUM CHLORIDE, CALCIUM CHLORIDE 600; 310; 30; 20 MG/100ML; MG/100ML; MG/100ML; MG/100ML
INJECTION, SOLUTION INTRAVENOUS CONTINUOUS
Status: DISCONTINUED | OUTPATIENT
Start: 2017-02-24 | End: 2017-02-24 | Stop reason: HOSPADM

## 2017-02-24 RX ORDER — DEXAMETHASONE SODIUM PHOSPHATE 4 MG/ML
INJECTION, SOLUTION INTRA-ARTICULAR; INTRALESIONAL; INTRAMUSCULAR; INTRAVENOUS; SOFT TISSUE
Status: DISCONTINUED | OUTPATIENT
Start: 2017-02-24 | End: 2017-02-24

## 2017-02-24 RX ORDER — PROPOFOL 10 MG/ML
VIAL (ML) INTRAVENOUS
Status: DISCONTINUED | OUTPATIENT
Start: 2017-02-24 | End: 2017-02-24

## 2017-02-24 RX ADMIN — LIDOCAINE HYDROCHLORIDE 60 MG: 20 INJECTION, SOLUTION INTRAVENOUS at 07:02

## 2017-02-24 RX ADMIN — DEXAMETHASONE SODIUM PHOSPHATE 4 MG: 4 INJECTION, SOLUTION INTRAMUSCULAR; INTRAVENOUS at 07:02

## 2017-02-24 RX ADMIN — BUPIVACAINE HYDROCHLORIDE AND EPINEPHRINE BITARTRATE 30 ML: 2.5; .0091 INJECTION, SOLUTION EPIDURAL; INFILTRATION; INTRACAUDAL; PERINEURAL at 07:02

## 2017-02-24 RX ADMIN — PHENYLEPHRINE HYDROCHLORIDE 100 MCG: 10 INJECTION INTRAVENOUS at 07:02

## 2017-02-24 RX ADMIN — LIDOCAINE HYDROCHLORIDE 10 MG: 10 INJECTION, SOLUTION EPIDURAL; INFILTRATION; INTRACAUDAL; PERINEURAL at 06:02

## 2017-02-24 RX ADMIN — HEPARIN SODIUM (PORCINE) LOCK FLUSH IV SOLN 100 UNIT/ML 10 ML: 100 SOLUTION at 07:02

## 2017-02-24 RX ADMIN — FENTANYL CITRATE 25 MCG: 50 INJECTION INTRAMUSCULAR; INTRAVENOUS at 07:02

## 2017-02-24 RX ADMIN — CEFAZOLIN SODIUM 2 G: 2 SOLUTION INTRAVENOUS at 07:02

## 2017-02-24 RX ADMIN — SODIUM CHLORIDE, SODIUM LACTATE, POTASSIUM CHLORIDE, AND CALCIUM CHLORIDE: .6; .31; .03; .02 INJECTION, SOLUTION INTRAVENOUS at 06:02

## 2017-02-24 RX ADMIN — PROPOFOL 120 MG: 10 INJECTION, EMULSION INTRAVENOUS at 07:02

## 2017-02-24 RX ADMIN — PHENYLEPHRINE HYDROCHLORIDE 200 MCG: 10 INJECTION INTRAVENOUS at 07:02

## 2017-02-24 RX ADMIN — ONDANSETRON 4 MG: 2 INJECTION, SOLUTION INTRAMUSCULAR; INTRAVENOUS at 07:02

## 2017-02-24 NOTE — ANESTHESIA PREPROCEDURE EVALUATION
02/24/2017  Aurelia Massey is a 71 y.o., female.    [facility] Anesthesia Evaluation    I have reviewed the Patient Summary Reports.    I have reviewed the Nursing Notes.      Review of Systems  Anesthesia Hx:  No previous Anesthesia    Social:  Non-Smoker    Hematology/Oncology:        Current/Recent Cancer.   EENT/Dental:EENT/Dental Normal   Cardiovascular:  Cardiovascular Normal     Pulmonary:   Pneumonia    Renal/:  Renal/ Normal     Hepatic/GI:   Liver Disease,    Musculoskeletal:   Arthritis     Neurological:  Neurology Normal    Endocrine:  Endocrine Normal    Dermatological:  Skin Normal    Psych:  Psychiatric Normal           Physical Exam  General:  Well nourished    Airway/Jaw/Neck:  Airway Findings: Mouth Opening: Normal Tongue: Normal  General Airway Assessment: Adult  Mallampati: I  TM Distance: Normal, at least 6 cm        Eyes/Ears/Nose:  EYES/EARS/NOSE FINDINGS: Normal   Dental:  DENTAL FINDINGS: Normal   Chest/Lungs:  Chest/Lungs Findings: Clear to auscultation     Heart/Vascular:  Heart Findings: Rate: Normal  Rhythm: Regular Rhythm  Sounds: Normal  Heart murmur: negative Vascular Findings: Normal    Abdomen:  Abdomen Findings: Normal    Musculoskeletal:  Musculoskeletal Findings: Normal   Skin:  Skin Findings: Normal    Mental Status:  Mental Status Findings: Normal        Anesthesia Plan  Type of Anesthesia, risks & benefits discussed:  Anesthesia Type:  general  Patient's Preference:   Intra-op Monitoring Plan:   Intra-op Monitoring Plan Comments:   Post Op Pain Control Plan:   Post Op Pain Control Plan Comments:   Induction:   IV  Beta Blocker:  Patient is not currently on a Beta-Blocker (No further documentation required).       Informed Consent: Patient understands risks and agrees with Anesthesia plan.  Questions answered. Anesthesia consent signed with patient.  ASA Score: 3      Day of Surgery Review of History & Physical:    H&P update referred to the surgeon.         Ready For Surgery From Anesthesia Perspective.

## 2017-02-24 NOTE — DISCHARGE INSTRUCTIONS
General Information:    1.  Do not drink alcoholic beverages including beer for 24 hours or as long as you are on pain medication..  2.  Do not drive a motor vehicle, operate machinery or power tools, or signs legal papers for 24 hours or as long as you are on pain medication.   3.  You may experience light-headedness, dizziness, and sleepiness following surgery. Please do not stay alone. A responsible adult should be with you for this 24 hour period.  4.  Go home and rest.    5. Progress slowly to a normal diet unless instructed.  Otherwise, begin with liquids such as soft drinks, then soup and crackers working up to solid foods. Drink plenty of nonalcoholic fluids.  6.  Certain anesthetics and pain medications produce nausea and vomiting in certain       individuals. If nausea becomes a problem at home, call you doctor.    7. A nurse will be calling you sometime after surgery. Do not be alarmed. This is our way of finding out how you are doing.    8. Several times every hour while you are awake, take 2-3 deep breaths and cough. If you had stomach surgery hold a pillow or rolled towel firmly against your stomach before you cough. This will help with any pain the cough might cause.  9. Several times every hour while you are awake, pump and flex your feet 5-6 times and do foot circles. This will help prevent blood clots.    10.Call your doctor for severe pain, bleeding, fever, or signs or symptoms of infection (pain, swelling, redness, foul odor, drainage).Discharge Instructions: After Your Surgery/Procedure  Youve just had surgery. During surgery you were given medicine called anesthesia to keep you relaxed and free of pain. After surgery you may have some pain or nausea. This is common. Here are some tips for feeling better and getting well after surgery.     Stay on schedule with your medication.   Going home  Your doctor or nurse will show you how to take care of yourself when you go home. He or she will also  "answer your questions. Have an adult family member or friend drive you home.      For your safety we recommend these precaution for the first 24 hours after your procedure:  · Do not drive or use heavy equipment.  · Do not make important decisions or sign legal papers.  · Do not drink alcohol.  · Have someone stay with you, if needed. He or she can watch for problems and help keep you safe.  · Your concentration, balance, coordination, and judgement may be impaired for many hours after anesthesia.  Use caution when ambulating or standing up.     · You may feel weak and "washed out" after anesthesia and surgery.      Subtle residual effects of general anesthesia or sedation with regional / local anesthesia can last more than 24 hours.  Rest for the remainder of the day or longer if your Doctor/Surgeon has advised you to do so.  Although you may feel normal within the first 24 hours, your reflexes and mental ability may be impaired without you realizing it.  You may feel dizzy, lightheaded or sleepy for 24 hours or longer.      Be sure to go to all follow-up visits with your doctor. And rest after your surgery for as long as your doctor tells you to.  Coping with pain  If you have pain after surgery, pain medicine will help you feel better. Take it as told, before pain becomes severe. Also, ask your doctor or pharmacist about other ways to control pain. This might be with heat, ice, or relaxation. And follow any other instructions your surgeon or nurse gives you.  Tips for taking pain medicine  To get the best relief possible, remember these points:  · Pain medicines can upset your stomach. Taking them with a little food may help.  · Most pain relievers taken by mouth need at least 20 to 30 minutes to start to work.  · Taking medicine on a schedule can help you remember to take it. Try to time your medicine so that you can take it before starting an activity. This might be before you get dressed, go for a walk, or sit " down for dinner.  · Constipation is a common side effect of pain medicines. Call your doctor before taking any medicines such as laxatives or stool softeners to help ease constipation. Also ask if you should skip any foods. Drinking lots of fluids and eating foods such as fruits and vegetables that are high in fiber can also help. Remember, do not take laxatives unless your surgeon has prescribed them.  · Drinking alcohol and taking pain medicine can cause dizziness and slow your breathing. It can even be deadly. Do not drink alcohol while taking pain medicine.  · Pain medicine can make you react more slowly to things. Do not drive or run machinery while taking pain medicine.  Your health care provider may tell you to take acetaminophen to help ease your pain. Ask him or her how much you are supposed to take each day. Acetaminophen or other pain relievers may interact with your prescription medicines or other over-the-counter (OTC) drugs. Some prescription medicines have acetaminophen and other ingredients. Using both prescription and OTC acetaminophen for pain can cause you to overdose. Read the labels on your OTC medicines with care. This will help you to clearly know the list of ingredients, how much to take, and any warnings. It may also help you not take too much acetaminophen. If you have questions or do not understand the information, ask your pharmacist or health care provider to explain it to you before you take the OTC medicine.  Managing nausea  Some people have an upset stomach after surgery. This is often because of anesthesia, pain, or pain medicine, or the stress of surgery. These tips will help you handle nausea and eat healthy foods as you get better. If you were on a special food plan before surgery, ask your doctor if you should follow it while you get better. These tips may help:  · Do not push yourself to eat. Your body will tell you when to eat and how much.  · Start off with clear liquids and  soup. They are easier to digest.  · Next try semi-solid foods, such as mashed potatoes, applesauce, and gelatin, as you feel ready.  · Slowly move to solid foods. Dont eat fatty, rich, or spicy foods at first.  · Do not force yourself to have 3 large meals a day. Instead eat smaller amounts more often.  · Take pain medicines with a small amount of solid food, such as crackers or toast, to avoid nausea.     Call your surgeon if  · You still have pain an hour after taking medicine. The medicine may not be strong enough.  · You feel too sleepy, dizzy, or groggy. The medicine may be too strong.  · You have side effects like nausea, vomiting, or skin changes, such as rash, itching, or hives.       If you have obstructive sleep apnea  You were given anesthesia medicine during surgery to keep you comfortable and free of pain. After surgery, you may have more apnea spells because of this medicine and other medicines you were given. The spells may last longer than usual.   At home:  · Keep using the continuous positive airway pressure (CPAP) device when you sleep. Unless your health care provider tells you not to, use it when you sleep, day or night. CPAP is a common device used to treat obstructive sleep apnea.  · Talk with your provider before taking any pain medicine, muscle relaxants, or sedatives. Your provider will tell you about the possible dangers of taking these medicines.  © 0585-3677 [employer]. [address] [place] [address]. All rights reserved. This information is not intended as a substitute for professional medical care. Always follow your healthcare professional's instructions.    Vascular Access Port Implantation   Port implantation is surgery to place (implant) a port under the skin. For vascular access, it is placed into a vein. The port allows medicines or nutrition to be sent right into your bloodstream. Blood can also be taken or given through the port. During the  procedure, a long, thin tube called a catheter is threaded into one of your large veins. The tube is then attached to the port. This usually sits under the skin of your chest and causes a small bump. To use the port, a special needle is passed through your skin and into the port. The needle can stay in your skin for up to 7 days, if needed. A port can stay in place for weeks or months or longer.    Why is a vascular access port needed?  A vascular access port may allow healthcare providers to give you:  · Chemotherapy or other cancer-fighting drugs  · IV treatments, such as antibiotics or nutrition  · Hemodialysis (for kidney failure)  The port may also be used to draw blood.  Before the procedure  Follow any instructions you are given on how to prepare.  Tell your provider about any medicines you are taking. This includes:  · All prescription medicines  · Over-the-counter medicines such as aspirin or ibuprofen  · Herbs, vitamins, and other supplements  Also be sure your provider knows:  · If you are pregnant or think you may be pregnant  · If you are allergic to any medicines or substances, especially local anesthetics or iodine  · Your full medical history, including why you will need the port  · If you plan on doing any contact sports  During the procedure  · Before the procedure, an IV may be put into a vein in your arm or hand. This gives you fluids and medicines. You may be given medicine through the IV to help you relax during the procedure. This is called sedation. But some surgeons place ports using general anesthesia.  · The chest is used most often for the port. In some cases, your belly (abdomen) or arm will be used instead.  · The skin over the insertion area is numbed with local anesthetic.  · Ultrasound or X-rays are used to help the healthcare provider guide the catheter into the proper location during the procedure.  · A cut (incision) is made in the skin where the port will be placed. A small  pocket for the port is formed under the skin.  · A second small incision is made in the skin near the first incision. A tunnel under the skin is created. The catheter is put through the tunnel and into the blood vessel.  · The skin is closed over the port. It is held shut with stitches (sutures) or surgical glue or tape. The second small incision is also closed.  · A chest X-ray may be done to make sure the port is placed properly.  After the procedure  You may be taken to a recovery room where youll recover from the sedation. Nurses will check on you as you rest. If you have pain, nurses can give you medicine. If you are not staying in the hospital overnight, you will be sent home a few hours after the procedure is done. A healthcare provider will tell you when you can go home. An adult family member or friend will need to drive you home.  Recovering at home  · Take pain medicine as directed by your healthcare provider.  · Take it easy for 24 hours after the procedure. Avoid physical activity and heavy lifting until your healthcare provider says its OK.  · Keep the port clean and dry. Ask when you can shower again. You will need to keep the port dry by covering it when you shower.  · Care for the insertion site as you are directed.  · Dont swim, bathe, or do other activities that cause water to cover the insertion site.  · To keep the port from getting blocked with blood clots, flush it as often as directed. You should be shown the proper way to flush the port before you go home. It is important to follow these directions.     Risks and possible complications of implantation  · Bleeding  · Infection of the insertion site  · Damage to a blood vessel  · Nerve injury or irritation  · Collapsed lung (for chest port placements)  · Skin breakdown over the port  Risks and possible complications of having a port  · Blocked  port or catheter  · Leakage or breakage of the port or catheter  · The port moves out of  position  · Blood clot  · Skin or bloodstream infection  · Skin breakdown over the port      When to seek medical care  Call your healthcare provider right away if you have any of the following:  · A fever of 100.4°F (38.0°C) or higher  · You can't access or use the port properly  · You can't flush the port or get a blood return  · The skin near the port is red, warm, swollen, or broken  · You have shoulder pain on the side where the port is located  · You feel a heart flutter or racing heart   · Swollen arm, if the port is placed in your arm   Date Last Reviewed: 7/1/2016  © 2784-6618 [employer]. [address] [place] [address], [place] [address]. All rights reserved. This information is not intended as a substitute for professional medical care. Always follow your healthcare professional's instructions.

## 2017-02-24 NOTE — PROGRESS NOTES
states that Dr Lamonte Samaniego was going to order labs to be done today so that pt won't have to come back next week for labs to be done prior to chemo tx.  Call placed to Dr Mcdowell's office and spoke with Aurelia.  Orders rec'd for CBC and CMP

## 2017-02-24 NOTE — PLAN OF CARE
Discharge instructions given to pt and  who voice understanding.  Taking po fluids without nausea.  Denies pain.  Dressing to left upper chest dry and intact

## 2017-02-24 NOTE — ANESTHESIA POSTPROCEDURE EVALUATION
"Anesthesia Post Evaluation    Patient: Aurelia Massey    Procedure(s) Performed: Procedure(s) (LRB):  [id number] (Right)    Final Anesthesia Type: general  Patient location during evaluation: PACU  Patient participation: Yes- Able to Participate  Level of consciousness: awake and alert  Post-procedure vital signs: reviewed and stable  Pain management: adequate  Airway patency: patent  PONV status at discharge: No PONV  Anesthetic complications: no      Cardiovascular status: hemodynamically stable  Respiratory status: unassisted and room air  Hydration status: euvolemic  Follow-up not needed.        Visit Vitals    BP (!) 102/56    Pulse 92    Temp 36.7 °C (98.1 °F) (Oral)    Resp 18    Ht 5' 3" (1.6 m)    Wt 45.8 kg (101 lb)    SpO2 95%    Breastfeeding No    BMI 17.89 kg/m2       Pain/Kathya Score: Pain Assessment Performed: Yes (2/24/2017  9:55 AM)  Presence of Pain: denies (2/24/2017  9:55 AM)  Kathya Score: 10 (2/24/2017  9:55 AM)      "

## 2017-02-24 NOTE — IP AVS SNAPSHOT
[address] [place] [address]  Phone: [phone]           Patient Discharge Instructions     Our goal is to set you up for success. This packet includes information on your condition, medications, and your home care. It will help you to care for yourself so you don't get sicker and need to go back to the hospital.     Please ask your nurse if you have any questions.        There are many details to remember when preparing to leave the hospital. Here is what you will need to do:    1. Take your medicine. If you are prescribed medications, review your Medication List in the following pages. You may have new medications to  at the pharmacy and others that you'll need to stop taking. Review the instructions for how and when to take your medications. Talk with your doctor or nurses if you are unsure of what to do.     2. Go to your follow-up appointments. Specific follow-up information is listed in the following pages. Your may be contacted by a transition nurse or clinical provider about future appointments. Be sure we have all of the phone numbers to reach you, if needed. Please contact your provider's office if you are unable to make an appointment.     3. Watch for warning signs. Your doctor or nurse will give you detailed warning signs to watch for and when to call for assistance. These instructions may also include educational information about your condition. If you experience any of warning signs to your health, call your doctor.               [facility]  Unless otherwise directed by your provider, please contact [facility], our nurse care line that is available for 24/7 assistance.     [phone] (toll-free)    Registered nurses in the [facility] provide clinical advisement, health education, appointment booking, and other advisory services.                    ** Verify the list of medication(s) below is accurate and up to date.  Carry this with you in case of emergency. If your medications have changed, please notify your healthcare provider.             Medication List      CONTINUE taking these medications        Additional Info                      CALCIUM 500 + D 500 mg(1,250mg) -200 unit per tablet   Refills:  0   Dose:  2 tablet   Generic drug:  calcium-vitamin D3    Instructions:  Take 2 tablets by mouth every other day.     Begin Date    AM    Noon    PM    Bedtime       multivitamin capsule   Refills:  0   Dose:  1 capsule    Instructions:  Take 1 capsule by mouth once daily.     Begin Date    AM    Noon    PM    Bedtime       ondansetron 8 MG Tbdl   Commonly known as:  ZOFRAN-ODT   Refills:  0   Dose:  4 mg    Instructions:  Take 4 mg by mouth every 6 (six) hours as needed (nausea).     Begin Date    AM    Noon    PM    Bedtime       oxycodone-acetaminophen 5-325 mg per tablet   Commonly known as:  PERCOCET   Quantity:  30 tablet   Refills:  0   Dose:  1-2 tablet    Instructions:  Take 1-2 tablets by mouth every 4 (four) hours as needed for Pain.     Begin Date    AM    Noon    PM    Bedtime                  Please bring to all follow up appointments:    1. A copy of your discharge instructions.  2. All medicines you are currently taking in their original bottles.  3. Identification and insurance card.    Please arrive 15 minutes ahead of scheduled appointment time.    Please call 24 hours in advance if you must reschedule your appointment and/or time.        Follow-up Information     Call Virgilio Lara MD.    Specialties:  General Surgery, Surgery    Why:  As needed    Contact information:    [address] [facility] [address]  [facility] [address]  [phone]          Discharge Instructions     Future Orders    Activity as tolerated     Call MD for:  difficulty breathing, headache or visual disturbances     Call MD for:  extreme fatigue     Call MD for:  hives     Call MD for:  persistent dizziness or light-headedness     Call MD for:   persistent nausea and vomiting     Call MD for:  redness, tenderness, or signs of infection (pain, swelling, redness, odor or green/yellow discharge around incision site)     Call MD for:  severe uncontrolled pain     Call MD for:  temperature >100.4     Diet general     Questions:    Total calories:      Fat restriction, if any:      Protein restriction, if any:      Na restriction, if any:      Fluid restriction:      Additional restrictions:      Lifting restrictions     Shower on day dressing removed (No bath)         Discharge Instructions       General Information:    1.  Do not drink alcoholic beverages including beer for 24 hours or as long as you are on pain medication..  2.  Do not drive a motor vehicle, operate machinery or power tools, or signs legal papers for 24 hours or as long as you are on pain medication.   3.  You may experience light-headedness, dizziness, and sleepiness following surgery. Please do not stay alone. A responsible adult should be with you for this 24 hour period.  4.  Go home and rest.    5. Progress slowly to a normal diet unless instructed.  Otherwise, begin with liquids such as soft drinks, then soup and crackers working up to solid foods. Drink plenty of nonalcoholic fluids.  6.  Certain anesthetics and pain medications produce nausea and vomiting in certain       individuals. If nausea becomes a problem at home, call you doctor.    7. A nurse will be calling you sometime after surgery. Do not be alarmed. This is our way of finding out how you are doing.    8. Several times every hour while you are awake, take 2-3 deep breaths and cough. If you had stomach surgery hold a pillow or rolled towel firmly against your stomach before you cough. This will help with any pain the cough might cause.  9. Several times every hour while you are awake, pump and flex your feet 5-6 times and do foot circles. This will help prevent blood clots.    10.Call your doctor for severe pain, bleeding,  "fever, or signs or symptoms of infection (pain, swelling, redness, foul odor, drainage).Discharge Instructions: After Your Surgery/Procedure  Youve just had surgery. During surgery you were given medicine called anesthesia to keep you relaxed and free of pain. After surgery you may have some pain or nausea. This is common. Here are some tips for feeling better and getting well after surgery.     Stay on schedule with your medication.   Going home  Your doctor or nurse will show you how to take care of yourself when you go home. He or she will also answer your questions. Have an adult family member or friend drive you home.      For your safety we recommend these precaution for the first 24 hours after your procedure:  · Do not drive or use heavy equipment.  · Do not make important decisions or sign legal papers.  · Do not drink alcohol.  · Have someone stay with you, if needed. He or she can watch for problems and help keep you safe.  · Your concentration, balance, coordination, and judgement may be impaired for many hours after anesthesia.  Use caution when ambulating or standing up.     · You may feel weak and "washed out" after anesthesia and surgery.      Subtle residual effects of general anesthesia or sedation with regional / local anesthesia can last more than 24 hours.  Rest for the remainder of the day or longer if your Doctor/Surgeon has advised you to do so.  Although you may feel normal within the first 24 hours, your reflexes and mental ability may be impaired without you realizing it.  You may feel dizzy, lightheaded or sleepy for 24 hours or longer.      Be sure to go to all follow-up visits with your doctor. And rest after your surgery for as long as your doctor tells you to.  Coping with pain  If you have pain after surgery, pain medicine will help you feel better. Take it as told, before pain becomes severe. Also, ask your doctor or pharmacist about other ways to control pain. This might be with " heat, ice, or relaxation. And follow any other instructions your surgeon or nurse gives you.  Tips for taking pain medicine  To get the best relief possible, remember these points:  · Pain medicines can upset your stomach. Taking them with a little food may help.  · Most pain relievers taken by mouth need at least 20 to 30 minutes to start to work.  · Taking medicine on a schedule can help you remember to take it. Try to time your medicine so that you can take it before starting an activity. This might be before you get dressed, go for a walk, or sit down for dinner.  · Constipation is a common side effect of pain medicines. Call your doctor before taking any medicines such as laxatives or stool softeners to help ease constipation. Also ask if you should skip any foods. Drinking lots of fluids and eating foods such as fruits and vegetables that are high in fiber can also help. Remember, do not take laxatives unless your surgeon has prescribed them.  · Drinking alcohol and taking pain medicine can cause dizziness and slow your breathing. It can even be deadly. Do not drink alcohol while taking pain medicine.  · Pain medicine can make you react more slowly to things. Do not drive or run machinery while taking pain medicine.  Your health care provider may tell you to take acetaminophen to help ease your pain. Ask him or her how much you are supposed to take each day. Acetaminophen or other pain relievers may interact with your prescription medicines or other over-the-counter (OTC) drugs. Some prescription medicines have acetaminophen and other ingredients. Using both prescription and OTC acetaminophen for pain can cause you to overdose. Read the labels on your OTC medicines with care. This will help you to clearly know the list of ingredients, how much to take, and any warnings. It may also help you not take too much acetaminophen. If you have questions or do not understand the information, ask your pharmacist or  health care provider to explain it to you before you take the OTC medicine.  Managing nausea  Some people have an upset stomach after surgery. This is often because of anesthesia, pain, or pain medicine, or the stress of surgery. These tips will help you handle nausea and eat healthy foods as you get better. If you were on a special food plan before surgery, ask your doctor if you should follow it while you get better. These tips may help:  · Do not push yourself to eat. Your body will tell you when to eat and how much.  · Start off with clear liquids and soup. They are easier to digest.  · Next try semi-solid foods, such as mashed potatoes, applesauce, and gelatin, as you feel ready.  · Slowly move to solid foods. Dont eat fatty, rich, or spicy foods at first.  · Do not force yourself to have 3 large meals a day. Instead eat smaller amounts more often.  · Take pain medicines with a small amount of solid food, such as crackers or toast, to avoid nausea.     Call your surgeon if  · You still have pain an hour after taking medicine. The medicine may not be strong enough.  · You feel too sleepy, dizzy, or groggy. The medicine may be too strong.  · You have side effects like nausea, vomiting, or skin changes, such as rash, itching, or hives.       If you have obstructive sleep apnea  You were given anesthesia medicine during surgery to keep you comfortable and free of pain. After surgery, you may have more apnea spells because of this medicine and other medicines you were given. The spells may last longer than usual.   At home:  · Keep using the continuous positive airway pressure (CPAP) device when you sleep. Unless your health care provider tells you not to, use it when you sleep, day or night. CPAP is a common device used to treat obstructive sleep apnea.  · Talk with your provider before taking any pain medicine, muscle relaxants, or sedatives. Your provider will tell you about the possible dangers of taking these  medicines.  © 5222-6544 [employer]. [address] [place] [address], [place] [address]. All rights reserved. This information is not intended as a substitute for professional medical care. Always follow your healthcare professional's instructions.    Vascular Access Port Implantation   Port implantation is surgery to place (implant) a port under the skin. For vascular access, it is placed into a vein. The port allows medicines or nutrition to be sent right into your bloodstream. Blood can also be taken or given through the port. During the procedure, a long, thin tube called a catheter is threaded into one of your large veins. The tube is then attached to the port. This usually sits under the skin of your chest and causes a small bump. To use the port, a special needle is passed through your skin and into the port. The needle can stay in your skin for up to 7 days, if needed. A port can stay in place for weeks or months or longer.    Why is a vascular access port needed?  A vascular access port may allow healthcare providers to give you:  · Chemotherapy or other cancer-fighting drugs  · IV treatments, such as antibiotics or nutrition  · Hemodialysis (for kidney failure)  The port may also be used to draw blood.  Before the procedure  Follow any instructions you are given on how to prepare.  Tell your provider about any medicines you are taking. This includes:  · All prescription medicines  · Over-the-counter medicines such as aspirin or ibuprofen  · Herbs, vitamins, and other supplements  Also be sure your provider knows:  · If you are pregnant or think you may be pregnant  · If you are allergic to any medicines or substances, especially local anesthetics or iodine  · Your full medical history, including why you will need the port  · If you plan on doing any contact sports  During the procedure  · Before the procedure, an IV may be put into a vein in your arm or hand. This gives you fluids and medicines. You  may be given medicine through the IV to help you relax during the procedure. This is called sedation. But some surgeons place ports using general anesthesia.  · The chest is used most often for the port. In some cases, your belly (abdomen) or arm will be used instead.  · The skin over the insertion area is numbed with local anesthetic.  · Ultrasound or X-rays are used to help the healthcare provider guide the catheter into the proper location during the procedure.  · A cut (incision) is made in the skin where the port will be placed. A small pocket for the port is formed under the skin.  · A second small incision is made in the skin near the first incision. A tunnel under the skin is created. The catheter is put through the tunnel and into the blood vessel.  · The skin is closed over the port. It is held shut with stitches (sutures) or surgical glue or tape. The second small incision is also closed.  · A chest X-ray may be done to make sure the port is placed properly.  After the procedure  You may be taken to a recovery room where youll recover from the sedation. Nurses will check on you as you rest. If you have pain, nurses can give you medicine. If you are not staying in the hospital overnight, you will be sent home a few hours after the procedure is done. A healthcare provider will tell you when you can go home. An adult family member or friend will need to drive you home.  Recovering at home  · Take pain medicine as directed by your healthcare provider.  · Take it easy for 24 hours after the procedure. Avoid physical activity and heavy lifting until your healthcare provider says its OK.  · Keep the port clean and dry. Ask when you can shower again. You will need to keep the port dry by covering it when you shower.  · Care for the insertion site as you are directed.  · Dont swim, bathe, or do other activities that cause water to cover the insertion site.  · To keep the port from getting blocked with blood  clots, flush it as often as directed. You should be shown the proper way to flush the port before you go home. It is important to follow these directions.     Risks and possible complications of implantation  · Bleeding  · Infection of the insertion site  · Damage to a blood vessel  · Nerve injury or irritation  · Collapsed lung (for chest port placements)  · Skin breakdown over the port  Risks and possible complications of having a port  · Blocked  port or catheter  · Leakage or breakage of the port or catheter  · The port moves out of position  · Blood clot  · Skin or bloodstream infection  · Skin breakdown over the port      When to seek medical care  Call your healthcare provider right away if you have any of the following:  · A fever of 100.4°F (38.0°C) or higher  · You can't access or use the port properly  · You can't flush the port or get a blood return  · The skin near the port is red, warm, swollen, or broken  · You have shoulder pain on the side where the port is located  · You feel a heart flutter or racing heart   · Swollen arm, if the port is placed in your arm   Date Last Reviewed: 7/1/2016  © 7147-8203 [employer]. [address] [place] [address]. All rights reserved. This information is not intended as a substitute for professional medical care. Always follow your healthcare professional's instructions.            Primary Diagnosis     Your primary diagnosis was:  Blood Pooling In Veins      Admission Information     Date & Time Provider Department CSN    2/24/2017  5:40 AM Virgilio Lara MD [facility] [id number]      Care Providers     Provider Role Specialty Primary office phone    Virgilio Lara MD Attending Provider General Surgery [phone]    Virgilio Lara MD Surgeon  General Surgery [phone]      Your Vitals Were     BP                   108/58           Recent Lab Values     No lab values to display.      Allergies as of 2/24/2017         Reactions    Plaquenil [Hydroxychloroquine] Palpitations      Advance Directives     An advance directive is a document which, in the event you are no longer able to make decisions for yourself, tells your healthcare team what kind of treatment you do or do not want to receive, or who you would like to make those decisions for you.  If you do not currently have an advance directive, [facility] encourages you to create one.  For more information call:  [phone]), [phone] ([phone]),  or log on to [email or web address].        Language Assistance Services     ATTENTION: Language assistance services are available, free of charge. Please call [phone].      ATENCIÓN: Si habla español, tiene a solano disposición servicios gratuitos de asistencia lingüística. Llame al [phone].     CHÚ Ý: N?u b?n nói Ti?ng Vi?t, có các d?ch v? h? tr? ngôn ng? mi?n phí dành cho b?n. G?i s? [phone].        Pneumonmia Discharge Instructions                 [facility] complies with applicable Federal civil rights laws and does not discriminate on the basis of race, color, national origin, age, disability, or sex.

## 2017-02-24 NOTE — INTERVAL H&P NOTE
The patient has been examined and the H&P has been reviewed:    I concur with the findings and no changes have occurred since H&P was written.    Anesthesia/Surgery risks, benefits and alternative options discussed and understood by patient/family.          Active Hospital Problems    Diagnosis  POA    Venous insufficiency, peripheral [I87.2]  Yes      Resolved Hospital Problems    Diagnosis Date Resolved POA   No resolved problems to display.

## 2017-02-24 NOTE — OP NOTE
DATE: 2/24/2017    PRE OP DX: Insufficient venous access for chemotherapy    POST OF DX: Same    PROCEDURE: Insertion of right Subclavian PowerPort    SURGEON: Virgilio Lara M.D.    ANESTHESIA: General    PROCEDURE AND FINDINGS:  With the patient in the supine trendelenburg position under satisfactory anesthesia the chest and neck were prepped and draped in the usual manner for port insertion.  The skin and subcutaneous tissue in the right subclavicular area was infiltrated with 0.25% Marcaine w/epinephrine.  Venepuncture was performed into the right subclavian vein and guide wire was advanced under fluoroscopic control into the SVC.    Skin incision was made and pocked created for the isp Power port.  The catheter was cut to 15 cm in length and connected to the port in the standard manner.  It was flushed with heparinized saline.  The catheter was introduced using the supplied peel-away sheath introducer and advanced to the SVC-RA junction using fluoroscopy..  Good flow was noted in both directions.  The port was sutured to the pectoralis fascia using 2-0 vicryl sutures.  Hemostasis was secure and the subcutaneous tissue was approximated with 3-0 vicryl.  The skin incision was closed with a 4-0 monocryl subcuticular stitch.  Steri strips and sterile dressing was applied.    Patient tolerated the procedure well and was sent to recovery room in satisfactory condition.    EBL: 10 cc's    Complications: none    Drains: none    Specimens: none

## 2017-02-24 NOTE — TRANSFER OF CARE
"Anesthesia Transfer of Care Note    Patient: Aurelia Massey    Procedure(s) Performed: Procedure(s) (LRB):  [id number] (Right)    Patient location: PACU    Anesthesia Type: general    Transport from OR: Transported from OR on 2-3 L/min O2 by NC with adequate spontaneous ventilation    Post pain: adequate analgesia    Post assessment: no apparent anesthetic complications and tolerated procedure well    Post vital signs: stable    Level of consciousness: awake    Nausea/Vomiting: no nausea/vomiting    Complications: none          Last vitals:   Visit Vitals    /60 (BP Location: Left arm, Patient Position: Lying, BP Method: Automatic)    Pulse 91    Temp 36.4 °C (97.6 °F) (Oral)    Resp 14    Ht 5' 3" (1.6 m)    Wt 45.8 kg (101 lb)    Breastfeeding No    BMI 17.89 kg/m2     "

## 2017-02-24 NOTE — DISCHARGE SUMMARY
Discharge Summary  General Surgery      Admit Date: 2/24/2017    Discharge Date :2/24/2017    Attending Physician: Virgilio Lara     Discharge Physician: Virgilio Lara    Discharged Condition: good    Discharge Diagnosis: Venous insufficiency, peripheral [I87.2]    Treatments/Procedures: Procedure(s) (LRB):  [id number] (Right)    Hospital Course: Uneventful; Discharged home from Recovery    Significant Diagnostic Studies: none    Disposition: Home or Self Care    Diet: Regular    Follow up: Office 10-14 days    Activity: No heavy lifting till seen in office.    Patient Instructions:   Current Discharge Medication List      CONTINUE these medications which have NOT CHANGED    Details   calcium-vitamin D3 (CALCIUM 500 + D) 500 mg(1,250mg) -200 unit per tablet Take 2 tablets by mouth every other day.      multivitamin capsule Take 1 capsule by mouth once daily.      ondansetron (ZOFRAN-ODT) 8 MG TbDL Take 4 mg by mouth every 6 (six) hours as needed (nausea).      oxycodone-acetaminophen (PERCOCET) 5-325 mg per tablet Take 1-2 tablets by mouth every 4 (four) hours as needed for Pain.  Qty: 30 tablet, Refills: 0               Discharge Procedure Orders  Diet general     Activity as tolerated     Shower on day dressing removed (No bath)     Lifting restrictions     Call MD for:  temperature >100.4     Call MD for:  persistent nausea and vomiting     Call MD for:  severe uncontrolled pain     Call MD for:  difficulty breathing, headache or visual disturbances     Call MD for:  redness, tenderness, or signs of infection (pain, swelling, redness, odor or green/yellow discharge around incision site)     Call MD for:  hives     Call MD for:  persistent dizziness or light-headedness     Call MD for:  extreme fatigue

## 2017-02-27 VITALS
RESPIRATION RATE: 18 BRPM | WEIGHT: 101 LBS | HEIGHT: 63 IN | DIASTOLIC BLOOD PRESSURE: 56 MMHG | SYSTOLIC BLOOD PRESSURE: 102 MMHG | OXYGEN SATURATION: 95 % | BODY MASS INDEX: 17.89 KG/M2 | HEART RATE: 92 BPM | TEMPERATURE: 98 F

## 2017-03-10 ENCOUNTER — HOSPITAL ENCOUNTER (OUTPATIENT)
Dept: RADIOLOGY | Facility: HOSPITAL | Age: 72
Discharge: HOME | End: 2017-03-10
Attending: INTERNAL MEDICINE
Payer: MEDICARE

## 2017-03-10 DIAGNOSIS — C80.0 DISSEMINATED MALIGNANT NEOPLASM: ICD-10-CM

## 2017-03-10 PROCEDURE — 71260 CT THORAX DX C+: CPT | Mod: TC

## 2017-03-10 PROCEDURE — 74177 CT ABD & PELVIS W/CONTRAST: CPT | Mod: TC

## 2017-03-10 PROCEDURE — 25500020 PHARM REV CODE 255

## 2017-03-10 PROCEDURE — 71260 CT THORAX DX C+: CPT | Mod: 26,,, | Performed by: RADIOLOGY

## 2017-03-10 PROCEDURE — 74177 CT ABD & PELVIS W/CONTRAST: CPT | Mod: 26,,, | Performed by: RADIOLOGY

## 2017-03-10 RX ADMIN — IOHEXOL 75 ML: 350 INJECTION, SOLUTION INTRAVENOUS at 10:03

## 2017-03-10 RX ADMIN — IOHEXOL 30 ML: 350 INJECTION, SOLUTION INTRAVENOUS at 10:03

## 2017-03-14 ENCOUNTER — HISTORICAL (OUTPATIENT)
Dept: ADMINISTRATIVE | Facility: HOSPITAL | Age: 72
End: 2017-03-14

## 2017-03-14 LAB
ALBUMIN SERPL-MCNC: 3.2 G/DL (ref 3.1–4.7)
ALP SERPL-CCNC: 81 IU/L (ref 40–104)
ALT (SGPT): 15 IU/L (ref 3–33)
AST SERPL-CCNC: 29 IU/L (ref 10–40)
BASOPHILS NFR BLD: 0 K/UL (ref 0–0.2)
BASOPHILS NFR BLD: 0.6 %
BILIRUB SERPL-MCNC: 0.4 MG/DL (ref 0.3–1)
BUN SERPL-MCNC: 12 MG/DL (ref 8–20)
CALCIUM SERPL-MCNC: 8.4 MG/DL (ref 7.7–10.4)
CHLORIDE: 103 MMOL/L (ref 98–110)
CO2 SERPL-SCNC: 24 MMOL/L (ref 22.8–31.6)
CREATININE: 0.69 MG/DL (ref 0.6–1.4)
EOSINOPHIL NFR BLD: 0.2 K/UL (ref 0–0.7)
EOSINOPHIL NFR BLD: 2.4 %
ERYTHROCYTE [DISTWIDTH] IN BLOOD BY AUTOMATED COUNT: 16.4 % (ref 12.5–14.5)
GLUCOSE: 111 MG/DL (ref 70–99)
GRAN #: 3.2 K/UL (ref 1.4–6.5)
GRAN%: 47.6 %
HCT VFR BLD AUTO: 29.2 % (ref 36–48)
HGB BLD-MCNC: 9.8 G/DL (ref 12–15)
IMMATURE GRANS (ABS): 0 K/UL (ref 0–1)
IMMATURE GRANULOCYTES: 0.2 %
LYMPH #: 2.4 K/UL (ref 1.2–3.4)
LYMPH%: 36.1 %
MCH RBC QN AUTO: 32.8 PG (ref 25–35)
MCHC RBC AUTO-ENTMCNC: 33.6 G/DL (ref 31–36)
MCV RBC AUTO: 97.7 FL (ref 79–98)
MONO #: 0.9 K/UL (ref 0.1–0.6)
MONO%: 13.1 %
NUCLEATED RBCS: 0 %
NUCLEATED RED BLOOD CELLS: 0 /100 WBC
PERFORMED BY:: ABNORMAL
PLATELET # BLD AUTO: 422 K/UL (ref 140–440)
PMV BLD AUTO: 9.2 FL (ref 8.8–12.7)
POTASSIUM SERPL-SCNC: 3.4 MMOL/L (ref 3.5–5)
PROT SERPL-MCNC: 5.9 G/DL (ref 6–8.2)
RBC # BLD AUTO: 2.99 M/UL (ref 3.5–5.5)
SODIUM: 135 MMOL/L (ref 134–144)
WBC # BLD: 6.6 K/UL (ref 5–10)

## 2017-04-10 LAB
ALBUMIN SERPL-MCNC: 3.4 G/DL (ref 3.1–4.7)
ALP SERPL-CCNC: 100 IU/L (ref 40–104)
ALT (SGPT): 14 IU/L (ref 3–33)
AST SERPL-CCNC: 31 IU/L (ref 10–40)
BASOPHILS NFR BLD: 0.1 K/UL (ref 0–0.2)
BASOPHILS NFR BLD: 1.7 %
BILIRUB SERPL-MCNC: 0.5 MG/DL (ref 0.3–1)
BUN SERPL-MCNC: 13 MG/DL (ref 8–20)
CALCIUM SERPL-MCNC: 9 MG/DL (ref 7.7–10.4)
CHLORIDE: 99 MMOL/L (ref 98–110)
CO2 SERPL-SCNC: 25.7 MMOL/L (ref 22.8–31.6)
CREATININE: 0.61 MG/DL (ref 0.6–1.4)
EOSINOPHIL NFR BLD: 0.2 K/UL (ref 0–0.7)
EOSINOPHIL NFR BLD: 2.8 %
ERYTHROCYTE [DISTWIDTH] IN BLOOD BY AUTOMATED COUNT: 16.9 % (ref 12.5–14.5)
GLUCOSE: 63 MG/DL (ref 70–99)
GRAN #: 2.5 K/UL (ref 1.4–6.5)
GRAN%: 31.4 %
HCT VFR BLD AUTO: 28.8 % (ref 36–48)
HGB BLD-MCNC: 9.7 G/DL (ref 12–15)
IMMATURE GRANS (ABS): 0.1 K/UL (ref 0–1)
IMMATURE GRANULOCYTES: 0.6 %
LYMPH #: 3.5 K/UL (ref 1.2–3.4)
LYMPH%: 44.3 %
MCH RBC QN AUTO: 33.1 PG (ref 25–35)
MCHC RBC AUTO-ENTMCNC: 33.7 G/DL (ref 31–36)
MCV RBC AUTO: 98.3 FL (ref 79–98)
MONO #: 1.5 K/UL (ref 0.1–0.6)
MONO%: 19.2 %
NUCLEATED RBCS: 0 %
NUCLEATED RED BLOOD CELLS: 0 /100 WBC
PERFORMED BY:: ABNORMAL
PLATELET # BLD AUTO: 601 K/UL (ref 140–440)
PMV BLD AUTO: 9 FL (ref 8.8–12.7)
POTASSIUM SERPL-SCNC: 4.4 MMOL/L (ref 3.5–5)
PROT SERPL-MCNC: 6.3 G/DL (ref 6–8.2)
RBC # BLD AUTO: 2.93 M/UL (ref 3.5–5.5)
SODIUM: 136 MMOL/L (ref 134–144)
WBC # BLD: 7.8 K/UL (ref 5–10)

## 2017-04-17 ENCOUNTER — HISTORICAL (OUTPATIENT)
Dept: ADMINISTRATIVE | Facility: HOSPITAL | Age: 72
End: 2017-04-17

## 2017-04-17 LAB
ALBUMIN SERPL-MCNC: 3.6 G/DL (ref 3.1–4.7)
ALP SERPL-CCNC: 86 IU/L (ref 40–104)
ALT (SGPT): 18 IU/L (ref 3–33)
AST SERPL-CCNC: 32 IU/L (ref 10–40)
BASOPHILS NFR BLD: 0.1 K/UL (ref 0–0.2)
BASOPHILS NFR BLD: 1.4 %
BILIRUB SERPL-MCNC: 0.6 MG/DL (ref 0.3–1)
BUN SERPL-MCNC: 15 MG/DL (ref 8–20)
CALCIUM SERPL-MCNC: 8.8 MG/DL (ref 7.7–10.4)
CHLORIDE: 103 MMOL/L (ref 98–110)
CO2 SERPL-SCNC: 25.9 MMOL/L (ref 22.8–31.6)
CREATININE: 0.78 MG/DL (ref 0.6–1.4)
EOSINOPHIL NFR BLD: 0.1 K/UL (ref 0–0.7)
EOSINOPHIL NFR BLD: 2.4 %
ERYTHROCYTE [DISTWIDTH] IN BLOOD BY AUTOMATED COUNT: 16.2 % (ref 12.5–14.5)
GLUCOSE: 87 MG/DL (ref 70–99)
GRAN #: 3.4 K/UL (ref 1.4–6.5)
GRAN%: 58.3 %
HCT VFR BLD AUTO: 26.6 % (ref 36–48)
HGB BLD-MCNC: 8.8 G/DL (ref 12–15)
IMMATURE GRANS (ABS): 0 K/UL (ref 0–1)
IMMATURE GRANULOCYTES: 0.2 %
LYMPH #: 1.9 K/UL (ref 1.2–3.4)
LYMPH%: 33.4 %
MCH RBC QN AUTO: 33.3 PG (ref 25–35)
MCHC RBC AUTO-ENTMCNC: 33.1 G/DL (ref 31–36)
MCV RBC AUTO: 100.8 FL (ref 79–98)
MONO #: 0.3 K/UL (ref 0.1–0.6)
MONO%: 4.3 %
NUCLEATED RBCS: 0 %
NUCLEATED RED BLOOD CELLS: 0 /100 WBC
PERFORMED BY:: ABNORMAL
PLATELET # BLD AUTO: 339 K/UL (ref 140–440)
PMV BLD AUTO: 9.6 FL (ref 8.8–12.7)
POTASSIUM SERPL-SCNC: 4.2 MMOL/L (ref 3.5–5)
PROT SERPL-MCNC: 6.5 G/DL (ref 6–8.2)
RBC # BLD AUTO: 2.64 M/UL (ref 3.5–5.5)
SODIUM: 137 MMOL/L (ref 134–144)
WBC # BLD: 5.8 K/UL (ref 5–10)

## 2017-04-24 LAB
ALBUMIN SERPL-MCNC: 3.3 G/DL (ref 3.1–4.7)
ALP SERPL-CCNC: 77 IU/L (ref 40–104)
ALT (SGPT): 20 IU/L (ref 3–33)
AST SERPL-CCNC: 32 IU/L (ref 10–40)
BASOPHILS NFR BLD: 0 K/UL (ref 0–0.2)
BASOPHILS NFR BLD: 0.4 %
BILIRUB SERPL-MCNC: 0.2 MG/DL (ref 0.3–1)
BUN SERPL-MCNC: 11 MG/DL (ref 8–20)
CALCIUM SERPL-MCNC: 8.6 MG/DL (ref 7.7–10.4)
CHLORIDE: 102 MMOL/L (ref 98–110)
CO2 SERPL-SCNC: 24 MMOL/L (ref 22.8–31.6)
CREATININE: 0.65 MG/DL (ref 0.6–1.4)
EOSINOPHIL NFR BLD: 0.1 K/UL (ref 0–0.7)
EOSINOPHIL NFR BLD: 2 %
ERYTHROCYTE [DISTWIDTH] IN BLOOD BY AUTOMATED COUNT: 15.9 % (ref 12.5–14.5)
GLUCOSE: 113 MG/DL (ref 70–99)
GRAN #: 0.7 K/UL (ref 1.4–6.5)
GRAN%: 29.6 %
HCT VFR BLD AUTO: 27.3 % (ref 36–48)
HGB BLD-MCNC: 9.1 G/DL (ref 12–15)
IMMATURE GRANS (ABS): 0 K/UL (ref 0–1)
IMMATURE GRANULOCYTES: 0 %
LYMPH #: 1.5 K/UL (ref 1.2–3.4)
LYMPH%: 61.2 %
MCH RBC QN AUTO: 33.8 PG (ref 25–35)
MCHC RBC AUTO-ENTMCNC: 33.3 G/DL (ref 31–36)
MCV RBC AUTO: 101.5 FL (ref 79–98)
MONO #: 0.2 K/UL (ref 0.1–0.6)
MONO%: 6.8 %
NUCLEATED RBCS: 1 %
NUCLEATED RED BLOOD CELLS: 1 /100 WBC
PERFORMED BY:: ABNORMAL
PLATELET # BLD AUTO: 254 K/UL (ref 140–440)
PMV BLD AUTO: 9.9 FL (ref 8.8–12.7)
POTASSIUM SERPL-SCNC: 3.7 MMOL/L (ref 3.5–5)
PROT SERPL-MCNC: 6.3 G/DL (ref 6–8.2)
RBC # BLD AUTO: 2.69 M/UL (ref 3.5–5.5)
SODIUM: 135 MMOL/L (ref 134–144)
WBC # BLD: 2.5 K/UL (ref 5–10)

## 2017-04-25 LAB — CANCER AG19-9 SERPL-ACNC: 22 U/ML (ref 0–35)

## 2017-05-01 ENCOUNTER — HISTORICAL (OUTPATIENT)
Dept: ADMINISTRATIVE | Facility: HOSPITAL | Age: 72
End: 2017-05-01

## 2017-05-01 LAB
BASOPHILS NFR BLD: 0.1 K/UL (ref 0–0.2)
BASOPHILS NFR BLD: 0.6 %
EOSINOPHIL NFR BLD: 0.4 K/UL (ref 0–0.7)
EOSINOPHIL NFR BLD: 2.3 %
ERYTHROCYTE [DISTWIDTH] IN BLOOD BY AUTOMATED COUNT: 17.5 % (ref 12.5–14.5)
GRAN #: 6.5 K/UL (ref 1.4–6.5)
GRAN%: 34.6 %
HCT VFR BLD AUTO: 30 % (ref 36–48)
HGB BLD-MCNC: 9.9 G/DL (ref 12–15)
IMMATURE GRANS (ABS): 4.5 K/UL (ref 0–1)
IMMATURE GRANULOCYTES: 23.8 %
LYMPH #: 3.5 K/UL (ref 1.2–3.4)
LYMPH%: 18.8 %
MCH RBC QN AUTO: 33 PG (ref 25–35)
MCHC RBC AUTO-ENTMCNC: 33 G/DL (ref 31–36)
MCV RBC AUTO: 100 FL (ref 79–98)
MONO #: 3.7 K/UL (ref 0.1–0.6)
MONO%: 19.9 %
NUCLEATED RBCS: 3 %
NUCLEATED RED BLOOD CELLS: 2 /100 WBC
PERFORMED BY:: ABNORMAL
PLATELET # BLD AUTO: 339 K/UL (ref 140–440)
PMV BLD AUTO: 9.6 FL (ref 8.8–12.7)
RBC # BLD AUTO: 3 M/UL (ref 3.5–5.5)
WBC # BLD: 18.8 K/UL (ref 5–10)

## 2017-05-08 LAB
ALBUMIN SERPL-MCNC: 3.5 G/DL (ref 3.1–4.7)
ALP SERPL-CCNC: 123 IU/L (ref 40–104)
ALT (SGPT): 32 IU/L (ref 3–33)
AST SERPL-CCNC: 46 IU/L (ref 10–40)
BASOPHILS NFR BLD: 0.1 K/UL (ref 0–0.2)
BASOPHILS NFR BLD: 1 %
BILIRUB SERPL-MCNC: 0.8 MG/DL (ref 0.3–1)
BUN SERPL-MCNC: 13 MG/DL (ref 8–20)
CALCIUM SERPL-MCNC: 8.9 MG/DL (ref 7.7–10.4)
CHLORIDE: 105 MMOL/L (ref 98–110)
CO2 SERPL-SCNC: 23.9 MMOL/L (ref 22.8–31.6)
CREATININE: 0.69 MG/DL (ref 0.6–1.4)
EOSINOPHIL NFR BLD: 0.1 K/UL (ref 0–0.7)
EOSINOPHIL NFR BLD: 1.5 %
ERYTHROCYTE [DISTWIDTH] IN BLOOD BY AUTOMATED COUNT: 16.7 % (ref 12.5–14.5)
GLUCOSE: 99 MG/DL (ref 70–99)
GRAN #: 2.8 K/UL (ref 1.4–6.5)
GRAN%: 57.8 %
HCT VFR BLD AUTO: 27.4 % (ref 36–48)
HGB BLD-MCNC: 9.2 G/DL (ref 12–15)
IMMATURE GRANS (ABS): 0 K/UL (ref 0–1)
IMMATURE GRANULOCYTES: 0.4 %
LYMPH #: 1.6 K/UL (ref 1.2–3.4)
LYMPH%: 33.3 %
MCH RBC QN AUTO: 33.9 PG (ref 25–35)
MCHC RBC AUTO-ENTMCNC: 33.6 G/DL (ref 31–36)
MCV RBC AUTO: 101.1 FL (ref 79–98)
MONO #: 0.3 K/UL (ref 0.1–0.6)
MONO%: 6 %
NUCLEATED RBCS: 0 %
NUCLEATED RED BLOOD CELLS: 0 /100 WBC
PERFORMED BY:: ABNORMAL
PLATELET # BLD AUTO: 307 K/UL (ref 140–440)
PMV BLD AUTO: 10.6 FL (ref 8.8–12.7)
POTASSIUM SERPL-SCNC: 3.6 MMOL/L (ref 3.5–5)
PROT SERPL-MCNC: 6.6 G/DL (ref 6–8.2)
RBC # BLD AUTO: 2.71 M/UL (ref 3.5–5.5)
SODIUM: 138 MMOL/L (ref 134–144)
WBC # BLD: 4.8 K/UL (ref 5–10)

## 2017-05-15 LAB
ALBUMIN SERPL-MCNC: 3.1 G/DL (ref 3.1–4.7)
ALP SERPL-CCNC: 112 IU/L (ref 40–104)
ALT (SGPT): 18 IU/L (ref 3–33)
AST SERPL-CCNC: 33 IU/L (ref 10–40)
BASOPHILS NFR BLD: 0 K/UL (ref 0–0.2)
BASOPHILS NFR BLD: 0.8 %
BILIRUB SERPL-MCNC: 0.6 MG/DL (ref 0.3–1)
BUN SERPL-MCNC: 12 MG/DL (ref 8–20)
CALCIUM SERPL-MCNC: 8.4 MG/DL (ref 7.7–10.4)
CHLORIDE: 95 MMOL/L (ref 98–110)
CO2 SERPL-SCNC: 21.8 MMOL/L (ref 22.8–31.6)
CREATININE: 0.63 MG/DL (ref 0.6–1.4)
EOSINOPHIL NFR BLD: 0.1 K/UL (ref 0–0.7)
EOSINOPHIL NFR BLD: 2.3 %
ERYTHROCYTE [DISTWIDTH] IN BLOOD BY AUTOMATED COUNT: 15.4 % (ref 12.5–14.5)
GLUCOSE: 119 MG/DL (ref 70–99)
GRAN #: 1 K/UL (ref 1.4–6.5)
GRAN%: 38.6 %
HCT VFR BLD AUTO: 22.4 % (ref 36–48)
HGB BLD-MCNC: 7.7 G/DL (ref 12–15)
IMMATURE GRANS (ABS): 0 K/UL (ref 0–1)
IMMATURE GRANULOCYTES: 0.8 %
LYMPH #: 1.4 K/UL (ref 1.2–3.4)
LYMPH%: 53.4 %
MCH RBC QN AUTO: 33.9 PG (ref 25–35)
MCHC RBC AUTO-ENTMCNC: 34.4 G/DL (ref 31–36)
MCV RBC AUTO: 98.7 FL (ref 79–98)
MONO #: 0.1 K/UL (ref 0.1–0.6)
MONO%: 4.1 %
NUCLEATED RBCS: 0 %
NUCLEATED RED BLOOD CELLS: 0 /100 WBC
PERFORMED BY:: ABNORMAL
PLATELET # BLD AUTO: 247 K/UL (ref 140–440)
PMV BLD AUTO: 9.4 FL (ref 8.8–12.7)
POTASSIUM SERPL-SCNC: 3.3 MMOL/L (ref 3.5–5)
PROT SERPL-MCNC: 5.7 G/DL (ref 6–8.2)
RBC # BLD AUTO: 2.27 M/UL (ref 3.5–5.5)
SODIUM: 126 MMOL/L (ref 134–144)
WBC # BLD: 2.7 K/UL (ref 5–10)

## 2017-05-16 DIAGNOSIS — T45.1X5A ANEMIA DUE TO ANTINEOPLASTIC CHEMOTHERAPY: Primary | ICD-10-CM

## 2017-05-16 DIAGNOSIS — D64.81 ANEMIA DUE TO ANTINEOPLASTIC CHEMOTHERAPY: Primary | ICD-10-CM

## 2017-05-16 LAB — CANCER AG19-9 SERPL-ACNC: 21 U/ML (ref 0–35)

## 2017-05-19 ENCOUNTER — TELEPHONE (OUTPATIENT)
Dept: HEMATOLOGY/ONCOLOGY | Facility: CLINIC | Age: 72
End: 2017-05-19

## 2017-05-19 NOTE — TELEPHONE ENCOUNTER
Patient  called in asking about chemo treatments he is concerned that she has missed 2-3 chemo treatments in a row and would like to know if she has to start over or if this is normal. Please return call.

## 2017-05-26 ENCOUNTER — OFFICE VISIT (OUTPATIENT)
Dept: HEMATOLOGY/ONCOLOGY | Facility: CLINIC | Age: 72
End: 2017-05-26
Payer: MEDICARE

## 2017-05-26 VITALS
DIASTOLIC BLOOD PRESSURE: 73 MMHG | SYSTOLIC BLOOD PRESSURE: 120 MMHG | BODY MASS INDEX: 18.98 KG/M2 | WEIGHT: 107.13 LBS | HEIGHT: 63 IN | RESPIRATION RATE: 18 BRPM | TEMPERATURE: 98 F | OXYGEN SATURATION: 100 % | HEART RATE: 69 BPM

## 2017-05-26 DIAGNOSIS — T45.1X5A CHEMOTHERAPY-INDUCED NEUTROPENIA: ICD-10-CM

## 2017-05-26 DIAGNOSIS — C22.1 CHOLANGIOCARCINOMA METASTATIC TO LIVER: Primary | ICD-10-CM

## 2017-05-26 DIAGNOSIS — C78.7 CHOLANGIOCARCINOMA METASTATIC TO LIVER: Primary | ICD-10-CM

## 2017-05-26 DIAGNOSIS — C16.0 MALIGNANT NEOPLASM OF CARDIA: ICD-10-CM

## 2017-05-26 DIAGNOSIS — D70.1 CHEMOTHERAPY-INDUCED NEUTROPENIA: ICD-10-CM

## 2017-05-26 LAB
ALBUMIN SERPL-MCNC: 3.6 G/DL (ref 3.1–4.7)
ALP SERPL-CCNC: 139 IU/L (ref 40–104)
ALT (SGPT): 13 IU/L (ref 3–33)
AST SERPL-CCNC: 34 IU/L (ref 10–40)
BASOPHILS NFR BLD: 0.1 K/UL (ref 0–0.2)
BASOPHILS NFR BLD: 1.5 %
BILIRUB SERPL-MCNC: 0.1 MG/DL (ref 0.3–1)
BUN SERPL-MCNC: 11 MG/DL (ref 8–20)
CALCIUM SERPL-MCNC: 8.7 MG/DL (ref 7.7–10.4)
CHLORIDE: 96 MMOL/L (ref 98–110)
CO2 SERPL-SCNC: 21.9 MMOL/L (ref 22.8–31.6)
CREATININE: 0.83 MG/DL (ref 0.6–1.4)
EOSINOPHIL NFR BLD: 0.2 K/UL (ref 0–0.7)
EOSINOPHIL NFR BLD: 2 %
ERYTHROCYTE [DISTWIDTH] IN BLOOD BY AUTOMATED COUNT: 15.9 % (ref 12.5–14.5)
GLUCOSE: 70 MG/DL (ref 70–99)
GRAN #: 4.1 K/UL (ref 1.4–6.5)
GRAN%: 50.7 %
HCT VFR BLD AUTO: 35.4 % (ref 36–48)
HGB BLD-MCNC: 11.7 G/DL (ref 12–15)
IMMATURE GRANS (ABS): 0 K/UL (ref 0–1)
IMMATURE GRANULOCYTES: 0.4 %
LYMPH #: 2.6 K/UL (ref 1.2–3.4)
LYMPH%: 31.9 %
MCH RBC QN AUTO: 33 PG (ref 25–35)
MCHC RBC AUTO-ENTMCNC: 33.1 G/DL (ref 31–36)
MCV RBC AUTO: 99.7 FL (ref 79–98)
MONO #: 1.1 K/UL (ref 0.1–0.6)
MONO%: 13.5 %
NUCLEATED RBCS: 0 %
NUCLEATED RED BLOOD CELLS: 0 /100 WBC
PERFORMED BY:: ABNORMAL
PLATELET # BLD AUTO: 509 K/UL (ref 140–440)
PMV BLD AUTO: 8.9 FL (ref 8.8–12.7)
POTASSIUM SERPL-SCNC: 3.6 MMOL/L (ref 3.5–5)
PROT SERPL-MCNC: 6.7 G/DL (ref 6–8.2)
RBC # BLD AUTO: 3.55 M/UL (ref 3.5–5.5)
SODIUM: 128 MMOL/L (ref 134–144)
WBC # BLD: 8.1 K/UL (ref 5–10)

## 2017-05-26 PROCEDURE — 1159F MED LIST DOCD IN RCRD: CPT | Mod: ,,, | Performed by: INTERNAL MEDICINE

## 2017-05-26 PROCEDURE — 99214 OFFICE O/P EST MOD 30 MIN: CPT | Mod: ,,, | Performed by: INTERNAL MEDICINE

## 2017-05-26 PROCEDURE — 1125F AMNT PAIN NOTED PAIN PRSNT: CPT | Mod: ,,, | Performed by: INTERNAL MEDICINE

## 2017-05-26 PROCEDURE — 1157F ADVNC CARE PLAN IN RCRD: CPT | Mod: ,,, | Performed by: INTERNAL MEDICINE

## 2017-05-26 RX ORDER — HEPARIN 100 UNIT/ML
500 SYRINGE INTRAVENOUS
Status: CANCELLED | OUTPATIENT
Start: 2017-06-25

## 2017-05-26 RX ORDER — HEPARIN 100 UNIT/ML
500 SYRINGE INTRAVENOUS
Status: CANCELLED | OUTPATIENT
Start: 2017-07-09

## 2017-05-26 RX ORDER — ACETAMINOPHEN 325 MG/1
650 TABLET ORAL
Status: CANCELLED | OUTPATIENT
Start: 2017-06-25

## 2017-05-26 RX ORDER — ONDANSETRON 2 MG/ML
16 INJECTION INTRAMUSCULAR; INTRAVENOUS
Status: CANCELLED | OUTPATIENT
Start: 2017-07-09

## 2017-05-26 RX ORDER — ONDANSETRON 2 MG/ML
16 INJECTION INTRAMUSCULAR; INTRAVENOUS
Status: CANCELLED | OUTPATIENT
Start: 2017-06-25

## 2017-05-26 RX ORDER — SODIUM CHLORIDE 0.9 % (FLUSH) 0.9 %
10 SYRINGE (ML) INJECTION
Status: CANCELLED | OUTPATIENT
Start: 2017-07-02

## 2017-05-26 RX ORDER — ACETAMINOPHEN 325 MG/1
650 TABLET ORAL
Status: CANCELLED | OUTPATIENT
Start: 2017-07-02

## 2017-05-26 RX ORDER — HEPARIN 100 UNIT/ML
500 SYRINGE INTRAVENOUS
Status: CANCELLED | OUTPATIENT
Start: 2017-07-02

## 2017-05-26 RX ORDER — ACETAMINOPHEN 325 MG/1
650 TABLET ORAL
Status: CANCELLED | OUTPATIENT
Start: 2017-07-09

## 2017-05-26 RX ORDER — SODIUM CHLORIDE 0.9 % (FLUSH) 0.9 %
10 SYRINGE (ML) INJECTION
Status: CANCELLED | OUTPATIENT
Start: 2017-06-25

## 2017-05-26 RX ORDER — ONDANSETRON 2 MG/ML
16 INJECTION INTRAMUSCULAR; INTRAVENOUS
Status: CANCELLED | OUTPATIENT
Start: 2017-07-02

## 2017-05-26 RX ORDER — ONDANSETRON HYDROCHLORIDE 8 MG/1
TABLET, FILM COATED ORAL
Refills: 2 | COMMUNITY
Start: 2017-03-17

## 2017-05-26 RX ORDER — SODIUM CHLORIDE 0.9 % (FLUSH) 0.9 %
10 SYRINGE (ML) INJECTION
Status: CANCELLED | OUTPATIENT
Start: 2017-07-09

## 2017-05-26 NOTE — LETTER
May 28, 2017      Avinash Rogers MD  [address]  [address]  [place] [facility] [address]           [facility]ology [facility]  [address]  [address]  [facility] [address]  Phone: [phone]  Fax: [phone]          Patient: Aurelia Massey   MR Number: [id number]   YOB: 1945   Date of Visit: 5/26/2017       Dear Dr. Avinash Rogers:    Thank you for referring Aurelia Massey to me for evaluation. Attached you will find relevant portions of my assessment and plan of care.    If you have questions, please do not hesitate to call me. I look forward to following Aurelia Massey along with you.    Sincerely,        Enclosure  CC:  No Recipients    If you would like to receive this communication electronically, please contact [email or web address] or [phone] to request more information on [employer] Link access.    For providers and/or their staff who would like to refer a patient to [facility], please contact us through our one-stop-shop provider referral line, [facility] , at [phone].    If you feel you have received this communication in error or would no longer like to receive these types of communications, please e-mail [email or web address]

## 2017-05-29 NOTE — PROGRESS NOTES
"       [facility] HEME/ONC PROGRESS NOTE      Subjective:       Patient ID:   Aurelia Massey  71 y.o. female.  1945      Chief Complaint:  Here to review chemo status    History of Present Illness:     Patient returns today for a regularly scheduled follow-up visit.   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  Due for chemo 5/30/17.  No c/o.  No  N/V/ D/ C.  No pain.            ROS:   GEN: normal without any fever, night sweats or weight loss  HEENT: normal with no HA's, sore throat, stiff neck, changes in vision  CV: normal with no CP, SOB, PND, CAMPBELL or orthopnea  PULM: normal with no SOB, cough, hemoptysis, sputum or pleuritic pain  GI: normal with no abdominal pain, nausea, vomiting, constipation, diarrhea, melanotic stools, BRBPR, or hematemesis  : normal with no hematuria, dysuria  BREAST: normal with no mass, discharge, pain  SKIN: normal with no rash, erythema, bruising, or swelling    Allergies:  Review of patient's allergies indicates:   Allergen Reactions    Plaquenil [hydroxychloroquine] Palpitations       Medications:    Current Outpatient Prescriptions:     calcium-vitamin D3 (CALCIUM 500 + D) 500 mg(1,250mg) -200 unit per tablet, Take 2 tablets by mouth every other day., Disp: , Rfl:     multivitamin capsule, Take 1 capsule by mouth once daily., Disp: , Rfl:     ondansetron (ZOFRAN) 8 MG tablet, TAKE 1 TABLET BY MOUTH EVERY 8 HOURS AS NEEDED FOR NAUSEA AND VOMITING, Disp: , Rfl: 2    ondansetron (ZOFRAN-ODT) 8 MG TbDL, Take 4 mg by mouth every 6 (six) hours as needed (nausea)., Disp: , Rfl:     oxycodone-acetaminophen (PERCOCET) 5-325 mg per tablet, Take 1-2 tablets by mouth every 4 (four) hours as needed for Pain., Disp: 30 tablet, Rfl: 0      Objective:     Vitals:  Blood pressure 120/73, pulse 69, temperature 97.8 °F (36.6 °C), resp. rate 18, height 5' 3" (1.6 m), weight 48.6 kg (107 lb 1.6 oz), SpO2 100 " %.    Physical Examination:   GEN: no apparent distress, comfortable; AAOx3  HEAD: atraumatic and normocephalic  EYES: no pallor, no icterus, PERRLA  ENT: OMM, no pharyngeal erythema, external ears WNL; no nasal discharge; no thrush  NECK: no masses, thyroid normal, trachea midline, no LAD/LN's, supple  CV: RRR with no murmur; normal pulse; normal S1 and S2; no pedal edema  CHEST: Normal respiratory effort; CTAB; normal breath sounds; no wheeze or crackles  ABDOM: nontender and nondistended; soft; normal bowel sounds; no rebound/guarding  MUSC/Skeletal: ROM normal; no crepitus; joints normal; no deformities or arthropathy  EXTREM: no clubbing, cyanosis, inflammation or swelling  SKIN: no rashes, lesions, ulcers, petechiae or subcutaneous nodules  : no valenzuela  NEURO: grossly intact; motor/sensory WNL; AAOx3; no tremors  PSYCH: normal mood, affect and behavior  LYMPH: normal cervical, supraclavicular, axillary and groin LN's            Labs:   Lab Results   Component Value Date    WBC 8.1 05/26/2017    HGB 11.7 (L) 05/26/2017    HCT 35.4 (L) 05/26/2017    MCV 96 02/24/2017     (H) 05/26/2017    CMP  Sodium   Date Value Ref Range Status   05/26/2017 128 (L) 134 - 144 mmol/L      Potassium   Date Value Ref Range Status   05/26/2017 3.6 3.5 - 5.0 mmol/L      Chloride   Date Value Ref Range Status   05/26/2017 96 (L) 98 - 110 mmol/L      CO2   Date Value Ref Range Status   05/26/2017 21.9 (L) 22.8 - 31.6 mmol/L      Glucose   Date Value Ref Range Status   05/26/2017 70 70 - 99 mg/dL      BUN, Bld   Date Value Ref Range Status   05/26/2017 11 8 - 20 mg/dL      Creatinine   Date Value Ref Range Status   05/26/2017 0.83 0.60 - 1.40 mg/dL      Calcium   Date Value Ref Range Status   05/26/2017 8.7 7.7 - 10.4 mg/dL      Total Protein   Date Value Ref Range Status   05/26/2017 6.7 6.0 - 8.2 g/dL      Albumin   Date Value Ref Range Status   05/26/2017 3.6 3.1 - 4.7 g/dL      Total Bilirubin   Date Value Ref Range Status    05/26/2017 0.1 (L) 0.3 - 1.0 mg/dL      Alkaline Phosphatase   Date Value Ref Range Status   05/26/2017 139 (H) 40 - 104 IU/L      AST   Date Value Ref Range Status   05/26/2017 34 10 - 40 IU/L      ALT   Date Value Ref Range Status   02/24/2017 21 10 - 44 U/L Final     Anion Gap   Date Value Ref Range Status   02/24/2017 9 8 - 16 mmol/L Final     eGFR if    Date Value Ref Range Status   02/24/2017 >60 >60 mL/min/1.73 m^2 Final     eGFR if non    Date Value Ref Range Status   02/24/2017 >60 >60 mL/min/1.73 m^2 Final     Comment:     Calculation used to obtain the estimated glomerular filtration  rate (eGFR) is the CKD-EPI equation. Since race is unknown   in our information system, the eGFR values for   -American and Non--American patients are given   for each creatinine result.       I have reviewed all available lab results and radiology reports.    Radiology/Diagnostic Studies:          Assessment/Plan:   (1) 71 y.o. 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  Responding to chemo.  Continue chemo on 5/3/17.  Abraxane gemzar.  D1C5.              Discussion:     I have explained and the patient understands all of  the current recommendation(s). I have answered all of their questions to the best of my ability and to their complete satisfaction.   The patient is to continue with the current management plan.    RTC in  1 week.        Electronically signed by SADIA Whitehead

## 2017-05-30 RX ORDER — SODIUM CHLORIDE 0.9 % (FLUSH) 0.9 %
10 SYRINGE (ML) INJECTION
Status: CANCELLED | OUTPATIENT
Start: 2017-06-13

## 2017-05-30 RX ORDER — HEPARIN 100 UNIT/ML
500 SYRINGE INTRAVENOUS
Status: CANCELLED | OUTPATIENT
Start: 2017-06-06

## 2017-05-30 RX ORDER — SODIUM CHLORIDE 0.9 % (FLUSH) 0.9 %
10 SYRINGE (ML) INJECTION
Status: CANCELLED | OUTPATIENT
Start: 2017-05-30

## 2017-05-30 RX ORDER — HEPARIN 100 UNIT/ML
500 SYRINGE INTRAVENOUS
Status: CANCELLED | OUTPATIENT
Start: 2017-05-30

## 2017-05-30 RX ORDER — HEPARIN 100 UNIT/ML
500 SYRINGE INTRAVENOUS
Status: CANCELLED | OUTPATIENT
Start: 2017-06-13

## 2017-05-30 RX ORDER — ACETAMINOPHEN 325 MG/1
650 TABLET ORAL
Status: CANCELLED | OUTPATIENT
Start: 2017-05-30

## 2017-05-30 RX ORDER — ACETAMINOPHEN 325 MG/1
650 TABLET ORAL
Status: CANCELLED | OUTPATIENT
Start: 2017-06-13

## 2017-05-30 RX ORDER — SODIUM CHLORIDE 0.9 % (FLUSH) 0.9 %
10 SYRINGE (ML) INJECTION
Status: CANCELLED | OUTPATIENT
Start: 2017-06-06

## 2017-05-30 RX ORDER — ACETAMINOPHEN 325 MG/1
650 TABLET ORAL
Status: CANCELLED | OUTPATIENT
Start: 2017-06-06

## 2017-06-05 ENCOUNTER — TELEPHONE (OUTPATIENT)
Dept: NEUROLOGY | Facility: HOSPITAL | Age: 72
End: 2017-06-05

## 2017-06-05 ENCOUNTER — HISTORICAL (OUTPATIENT)
Dept: ADMINISTRATIVE | Facility: HOSPITAL | Age: 72
End: 2017-06-05

## 2017-06-05 LAB
ALBUMIN SERPL-MCNC: 3.2 G/DL (ref 3.1–4.7)
ALP SERPL-CCNC: 133 IU/L (ref 40–104)
ALT (SGPT): 22 IU/L (ref 3–33)
AST SERPL-CCNC: 35 IU/L (ref 10–40)
BASOPHILS NFR BLD: 0.1 K/UL (ref 0–0.2)
BASOPHILS NFR BLD: 1.6 %
BILIRUB SERPL-MCNC: 0.6 MG/DL (ref 0.3–1)
BUN SERPL-MCNC: 12 MG/DL (ref 8–20)
CALCIUM SERPL-MCNC: 8.7 MG/DL (ref 7.7–10.4)
CHLORIDE: 102 MMOL/L (ref 98–110)
CO2 SERPL-SCNC: 23.5 MMOL/L (ref 22.8–31.6)
CREATININE: 0.73 MG/DL (ref 0.6–1.4)
EOSINOPHIL NFR BLD: 0.1 K/UL (ref 0–0.7)
EOSINOPHIL NFR BLD: 1.8 %
ERYTHROCYTE [DISTWIDTH] IN BLOOD BY AUTOMATED COUNT: 15.3 % (ref 12.5–14.5)
GLUCOSE: 113 MG/DL (ref 70–99)
GRAN #: 1.9 K/UL (ref 1.4–6.5)
GRAN%: 49.9 %
HCT VFR BLD AUTO: 32.7 % (ref 36–48)
HGB BLD-MCNC: 10.8 G/DL (ref 12–15)
IMMATURE GRANS (ABS): 0 K/UL (ref 0–1)
IMMATURE GRANULOCYTES: 0.3 %
LYMPH #: 1.7 K/UL (ref 1.2–3.4)
LYMPH%: 44.1 %
MCH RBC QN AUTO: 32.7 PG (ref 25–35)
MCHC RBC AUTO-ENTMCNC: 33 G/DL (ref 31–36)
MCV RBC AUTO: 99.1 FL (ref 79–98)
MONO #: 0.1 K/UL (ref 0.1–0.6)
MONO%: 2.3 %
NUCLEATED RBCS: 0 %
NUCLEATED RED BLOOD CELLS: 0 /100 WBC
PERFORMED BY:: ABNORMAL
PLATELET # BLD AUTO: 174 K/UL (ref 140–440)
PMV BLD AUTO: 10.1 FL (ref 8.8–12.7)
POTASSIUM SERPL-SCNC: 3.4 MMOL/L (ref 3.5–5)
PROT SERPL-MCNC: 6.3 G/DL (ref 6–8.2)
RBC # BLD AUTO: 3.3 M/UL (ref 3.5–5.5)
SODIUM: 136 MMOL/L (ref 134–144)
WBC # BLD: 3.8 K/UL (ref 5–10)

## 2017-06-05 NOTE — TELEPHONE ENCOUNTER
----- Message from Lindsey Chacon sent at 6/2/2017  3:03 PM CDT -----  Contact: Referral  EAW/New patient - Who called: Letter from [facility] Dr. Lamonte Whitehead Hematology/oncology    Referred by: Dr. Whitehead     Why do you need to be seen? 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 [facility] regarding Intrahepatic therapy    You may contact patient back to schedule at:     Instructed patient to gather medical records, Cd's and medication list and fax or mail to us asap.

## 2017-06-09 ENCOUNTER — TELEPHONE (OUTPATIENT)
Dept: NEUROLOGY | Facility: HOSPITAL | Age: 72
End: 2017-06-09

## 2017-06-09 NOTE — TELEPHONE ENCOUNTER
----- Message from Lindsey Chacon sent at 6/9/2017  8:09 AM CDT -----  Contact: Patient  JPMARIBELL- patient is not feeling well and had to cancel her appointment today. Patient is rescheduled for the next first available on 7/21/17.

## 2017-06-12 ENCOUNTER — TELEPHONE (OUTPATIENT)
Dept: HEMATOLOGY/ONCOLOGY | Facility: CLINIC | Age: 72
End: 2017-06-12

## 2017-06-12 LAB
ALBUMIN SERPL-MCNC: 3.3 G/DL (ref 3.1–4.7)
ALP SERPL-CCNC: 147 IU/L (ref 40–104)
ALT (SGPT): 27 IU/L (ref 3–33)
AST SERPL-CCNC: 40 IU/L (ref 10–40)
BASOPHILS NFR BLD: 0 K/UL (ref 0–0.2)
BASOPHILS NFR BLD: 1 %
BILIRUB SERPL-MCNC: 0.3 MG/DL (ref 0.3–1)
BUN SERPL-MCNC: 16 MG/DL (ref 8–20)
CALCIUM SERPL-MCNC: 8.8 MG/DL (ref 7.7–10.4)
CHLORIDE: 107 MMOL/L (ref 98–110)
CO2 SERPL-SCNC: 25.2 MMOL/L (ref 22.8–31.6)
CREATININE: 0.76 MG/DL (ref 0.6–1.4)
EOSINOPHIL NFR BLD: 0 K/UL (ref 0–0.7)
EOSINOPHIL NFR BLD: 1 %
ERYTHROCYTE [DISTWIDTH] IN BLOOD BY AUTOMATED COUNT: 15 % (ref 12.5–14.5)
GLUCOSE: 90 MG/DL (ref 70–99)
GRAN #: 0.6 K/UL (ref 1.4–6.5)
GRAN%: 28.6 %
HCT VFR BLD AUTO: 31.6 % (ref 36–48)
HGB BLD-MCNC: 10.4 G/DL (ref 12–15)
IMMATURE GRANS (ABS): 0 K/UL (ref 0–1)
IMMATURE GRANULOCYTES: 0 %
LYMPH #: 1.3 K/UL (ref 1.2–3.4)
LYMPH%: 64.4 %
MCH RBC QN AUTO: 32.2 PG (ref 25–35)
MCHC RBC AUTO-ENTMCNC: 32.9 G/DL (ref 31–36)
MCV RBC AUTO: 97.8 FL (ref 79–98)
MONO #: 0.1 K/UL (ref 0.1–0.6)
MONO%: 5 %
NUCLEATED RBCS: 0 %
NUCLEATED RED BLOOD CELLS: 0 /100 WBC
PERFORMED BY:: ABNORMAL
PLATELET # BLD AUTO: 209 K/UL (ref 140–440)
PMV BLD AUTO: 9.6 FL (ref 8.8–12.7)
POTASSIUM SERPL-SCNC: 4 MMOL/L (ref 3.5–5)
PROT SERPL-MCNC: 6.7 G/DL (ref 6–8.2)
RBC # BLD AUTO: 3.23 M/UL (ref 3.5–5.5)
SODIUM: 138 MMOL/L (ref 134–144)
WBC # BLD: 2 K/UL (ref 5–10)

## 2017-06-12 NOTE — TELEPHONE ENCOUNTER
Notified pt. ANC  Is to low this week for treatment on 6/13. Hold day 15 and plan on treatment with day 1 in 2 weeks but to check labs. CORNELIO.

## 2017-06-21 LAB
ALBUMIN SERPL-MCNC: 3.5 G/DL (ref 3.1–4.7)
ALP SERPL-CCNC: 137 IU/L (ref 40–104)
ALT (SGPT): 21 IU/L (ref 3–33)
AST SERPL-CCNC: 37 IU/L (ref 10–40)
BASOPHILS NFR BLD: 0.1 K/UL (ref 0–0.2)
BASOPHILS NFR BLD: 1.1 %
BILIRUB SERPL-MCNC: 0.5 MG/DL (ref 0.3–1)
BUN SERPL-MCNC: 17 MG/DL (ref 8–20)
CALCIUM SERPL-MCNC: 9 MG/DL (ref 7.7–10.4)
CHLORIDE: 110 MMOL/L (ref 98–110)
CO2 SERPL-SCNC: 24.9 MMOL/L (ref 22.8–31.6)
CREATININE: 0.9 MG/DL (ref 0.6–1.4)
EOSINOPHIL NFR BLD: 0.4 K/UL (ref 0–0.7)
EOSINOPHIL NFR BLD: 4.7 %
ERYTHROCYTE [DISTWIDTH] IN BLOOD BY AUTOMATED COUNT: 16.7 % (ref 12.5–14.5)
GLUCOSE: 98 MG/DL (ref 70–99)
GRAN #: 3.6 K/UL (ref 1.4–6.5)
GRAN%: 41.2 %
HCT VFR BLD AUTO: 34.9 % (ref 36–48)
HGB BLD-MCNC: 11.1 G/DL (ref 12–15)
IMMATURE GRANS (ABS): 0 K/UL (ref 0–1)
IMMATURE GRANULOCYTES: 0.2 %
LYMPH #: 3.1 K/UL (ref 1.2–3.4)
LYMPH%: 35.8 %
MCH RBC QN AUTO: 32 PG (ref 25–35)
MCHC RBC AUTO-ENTMCNC: 31.8 G/DL (ref 31–36)
MCV RBC AUTO: 100.6 FL (ref 79–98)
MONO #: 1.5 K/UL (ref 0.1–0.6)
MONO%: 17 %
NUCLEATED RBCS: 0 %
NUCLEATED RED BLOOD CELLS: 0 /100 WBC
PERFORMED BY:: ABNORMAL
PLATELET # BLD AUTO: 408 K/UL (ref 140–440)
PMV BLD AUTO: 9 FL (ref 8.8–12.7)
POTASSIUM SERPL-SCNC: 4.5 MMOL/L (ref 3.5–5)
PROT SERPL-MCNC: 6.7 G/DL (ref 6–8.2)
RBC # BLD AUTO: 3.47 M/UL (ref 3.5–5.5)
SODIUM: 143 MMOL/L (ref 134–144)
WBC # BLD: 8.8 K/UL (ref 5–10)

## 2017-06-22 LAB — CANCER AG19-9 SERPL-ACNC: 21 U/ML (ref 0–35)

## 2017-06-26 ENCOUNTER — TELEPHONE (OUTPATIENT)
Dept: HEMATOLOGY/ONCOLOGY | Facility: CLINIC | Age: 72
End: 2017-06-26

## 2017-06-26 DIAGNOSIS — C78.7 SECONDARY MALIGNANT NEOPLASM OF LIVER: Primary | ICD-10-CM

## 2017-06-26 DIAGNOSIS — C80.1 DILATED CARDIOMYOPATHY SECONDARY TO MALIGNANCY: ICD-10-CM

## 2017-06-26 DIAGNOSIS — I42.0 DILATED CARDIOMYOPATHY SECONDARY TO MALIGNANCY: ICD-10-CM

## 2017-06-26 LAB
ALBUMIN SERPL-MCNC: 3.3 G/DL (ref 3.1–4.7)
ALP SERPL-CCNC: 142 IU/L (ref 40–104)
ALT (SGPT): 17 IU/L (ref 3–33)
AST SERPL-CCNC: 32 IU/L (ref 10–40)
BASOPHILS NFR BLD: 0.1 K/UL (ref 0–0.2)
BASOPHILS NFR BLD: 1.7 %
BILIRUB SERPL-MCNC: 0.3 MG/DL (ref 0.3–1)
BUN SERPL-MCNC: 14 MG/DL (ref 8–20)
CALCIUM SERPL-MCNC: 8.7 MG/DL (ref 7.7–10.4)
CHLORIDE: 107 MMOL/L (ref 98–110)
CO2 SERPL-SCNC: 25.9 MMOL/L (ref 22.8–31.6)
CREATININE: 0.76 MG/DL (ref 0.6–1.4)
EOSINOPHIL NFR BLD: 0.4 K/UL (ref 0–0.7)
EOSINOPHIL NFR BLD: 5.1 %
ERYTHROCYTE [DISTWIDTH] IN BLOOD BY AUTOMATED COUNT: 16.5 % (ref 12.5–14.5)
GLUCOSE: 113 MG/DL (ref 70–99)
GRAN #: 2.5 K/UL (ref 1.4–6.5)
GRAN%: 31.6 %
HCT VFR BLD AUTO: 31.9 % (ref 36–48)
HGB BLD-MCNC: 10.4 G/DL (ref 12–15)
IMMATURE GRANS (ABS): 0 K/UL (ref 0–1)
IMMATURE GRANULOCYTES: 0.3 %
LYMPH #: 3.6 K/UL (ref 1.2–3.4)
LYMPH%: 46.5 %
MCH RBC QN AUTO: 32.7 PG (ref 25–35)
MCHC RBC AUTO-ENTMCNC: 32.6 G/DL (ref 31–36)
MCV RBC AUTO: 100.3 FL (ref 79–98)
MONO #: 1.2 K/UL (ref 0.1–0.6)
MONO%: 14.8 %
NUCLEATED RBCS: 0 %
NUCLEATED RED BLOOD CELLS: 0 /100 WBC
PERFORMED BY:: ABNORMAL
PLATELET # BLD AUTO: 403 K/UL (ref 140–440)
PMV BLD AUTO: 9.1 FL (ref 8.8–12.7)
POTASSIUM SERPL-SCNC: 4.3 MMOL/L (ref 3.5–5)
PROT SERPL-MCNC: 6.2 G/DL (ref 6–8.2)
RBC # BLD AUTO: 3.18 M/UL (ref 3.5–5.5)
SODIUM: 140 MMOL/L (ref 134–144)
WBC # BLD: 7.8 K/UL (ref 5–10)

## 2017-06-26 RX ORDER — HEPARIN 100 UNIT/ML
500 SYRINGE INTRAVENOUS
Status: CANCELLED | OUTPATIENT
Start: 2017-08-01

## 2017-06-26 RX ORDER — SODIUM CHLORIDE 0.9 % (FLUSH) 0.9 %
10 SYRINGE (ML) INJECTION
Status: CANCELLED | OUTPATIENT
Start: 2017-08-01

## 2017-06-26 RX ORDER — SODIUM CHLORIDE 0.9 % (FLUSH) 0.9 %
10 SYRINGE (ML) INJECTION
Status: CANCELLED | OUTPATIENT
Start: 2017-07-25

## 2017-06-26 RX ORDER — HEPARIN 100 UNIT/ML
500 SYRINGE INTRAVENOUS
Status: CANCELLED | OUTPATIENT
Start: 2017-08-08

## 2017-06-26 RX ORDER — ACETAMINOPHEN 325 MG/1
650 TABLET ORAL
Status: CANCELLED | OUTPATIENT
Start: 2017-08-01

## 2017-06-26 RX ORDER — ACETAMINOPHEN 325 MG/1
650 TABLET ORAL
Status: CANCELLED | OUTPATIENT
Start: 2017-07-25

## 2017-06-26 RX ORDER — SODIUM CHLORIDE 0.9 % (FLUSH) 0.9 %
10 SYRINGE (ML) INJECTION
Status: CANCELLED | OUTPATIENT
Start: 2017-08-08

## 2017-06-26 RX ORDER — HEPARIN 100 UNIT/ML
500 SYRINGE INTRAVENOUS
Status: CANCELLED | OUTPATIENT
Start: 2017-07-25

## 2017-06-26 RX ORDER — ACETAMINOPHEN 325 MG/1
650 TABLET ORAL
Status: CANCELLED | OUTPATIENT
Start: 2017-08-08

## 2017-06-27 ENCOUNTER — TELEPHONE (OUTPATIENT)
Dept: HEMATOLOGY/ONCOLOGY | Facility: CLINIC | Age: 72
End: 2017-06-27

## 2017-06-27 DIAGNOSIS — J20.9 ACUTE BRONCHITIS, UNSPECIFIED ORGANISM: Primary | ICD-10-CM

## 2017-06-27 RX ORDER — LEVOFLOXACIN 500 MG/1
500 TABLET, FILM COATED ORAL DAILY
Qty: 10 TABLET | Refills: 0 | Status: SHIPPED | OUTPATIENT
Start: 2017-06-27 | End: 2017-07-07

## 2017-06-27 NOTE — TELEPHONE ENCOUNTER
Called pt. Back to check status. Says she did not have fever at the moment. Took 1 dose of Tylenol this AM and has not returned yet. Denies chills, but states has started with what seems to be a head cold. Has congestion, stuffy nose and sllight cough. If you think she needs something called in she uses [facility] in [place] and denies allergies to meds.

## 2017-06-27 NOTE — TELEPHONE ENCOUNTER
Received call from pts.  c/o's of fever and chills, denies sx of infection. Instructed should go to ER to be checked and he said pt did not wish to do that. Instructed could give Tylenol to help with temp. That he states is 100.2. Scheduled for chemo today instr. To hold and I would notify Dr. Whitehead. VU.

## 2017-06-29 ENCOUNTER — HOSPITAL ENCOUNTER (EMERGENCY)
Facility: HOSPITAL | Age: 72
End: 2017-06-29
Attending: EMERGENCY MEDICINE | Admitting: EMERGENCY MEDICINE
Payer: MEDICARE

## 2017-06-29 VITALS
HEART RATE: 80 BPM | DIASTOLIC BLOOD PRESSURE: 75 MMHG | HEIGHT: 63 IN | BODY MASS INDEX: 18.96 KG/M2 | WEIGHT: 107 LBS | SYSTOLIC BLOOD PRESSURE: 132 MMHG | TEMPERATURE: 99 F | RESPIRATION RATE: 20 BRPM | OXYGEN SATURATION: 96 %

## 2017-06-29 DIAGNOSIS — E87.1 HYPONATREMIA: Primary | ICD-10-CM

## 2017-06-29 DIAGNOSIS — S36.112A LIVER HEMATOMA, INITIAL ENCOUNTER: ICD-10-CM

## 2017-06-29 DIAGNOSIS — R50.9 FEVER, UNSPECIFIED FEVER CAUSE: ICD-10-CM

## 2017-06-29 LAB
ABO + RH BLD: NORMAL
BASOPHILS # BLD AUTO: 0.05 K/UL
BASOPHILS NFR BLD: 0.6 %
BLD GP AB SCN CELLS X3 SERPL QL: NORMAL
DIFFERENTIAL METHOD: ABNORMAL
EOSINOPHIL # BLD AUTO: 0.2 K/UL
EOSINOPHIL NFR BLD: 2.7 %
ERYTHROCYTE [DISTWIDTH] IN BLOOD BY AUTOMATED COUNT: 15.9 %
HCT VFR BLD AUTO: 33.2 %
HGB BLD-MCNC: 11.6 G/DL
INR PPP: 1.1
LYMPHOCYTES # BLD AUTO: 2.4 K/UL
LYMPHOCYTES NFR BLD: 30.4 %
MCH RBC QN AUTO: 32.9 PG
MCHC RBC AUTO-ENTMCNC: 34.9 %
MCV RBC AUTO: 94 FL
MONOCYTES # BLD AUTO: 1.9 K/UL
MONOCYTES NFR BLD: 23.6 %
NEUTROPHILS # BLD AUTO: 3.3 K/UL
NEUTROPHILS NFR BLD: 42.4 %
PLATELET # BLD AUTO: 424 K/UL
PMV BLD AUTO: 9.2 FL
PROTHROMBIN TIME: 11.7 SEC
RBC # BLD AUTO: 3.53 M/UL
WBC # BLD AUTO: 7.87 K/UL

## 2017-06-29 PROCEDURE — 86900 BLOOD TYPING SEROLOGIC ABO: CPT

## 2017-06-29 PROCEDURE — 99285 EMERGENCY DEPT VISIT HI MDM: CPT

## 2017-06-29 PROCEDURE — 85610 PROTHROMBIN TIME: CPT

## 2017-06-29 PROCEDURE — 99285 EMERGENCY DEPT VISIT HI MDM: CPT | Mod: ,,, | Performed by: EMERGENCY MEDICINE

## 2017-06-29 PROCEDURE — 85025 COMPLETE CBC W/AUTO DIFF WBC: CPT

## 2017-06-29 PROCEDURE — 86850 RBC ANTIBODY SCREEN: CPT

## 2017-06-29 NOTE — ASSESSMENT & PLAN NOTE
-Patient with hyponatremia that was not present on labs obtained 3 days ago at outside hospital  -Recommend consultation to medicine for admission and management

## 2017-06-29 NOTE — ASSESSMENT & PLAN NOTE
-No laboratory or imaging evidence of cholecystitis/choledocholithiasis; therefore, no need for urgent surgical intervention.

## 2017-06-29 NOTE — SUBJECTIVE & OBJECTIVE
No current facility-administered medications on file prior to encounter.      Current Outpatient Prescriptions on File Prior to Encounter   Medication Sig    calcium-vitamin D3 (CALCIUM 500 + D) 500 mg(1,250mg) -200 unit per tablet Take 2 tablets by mouth every other day.    levoFLOXacin (LEVAQUIN) 500 MG tablet Take 1 tablet (500 mg total) by mouth once daily.    multivitamin capsule Take 1 capsule by mouth once daily.    ondansetron (ZOFRAN) 8 MG tablet TAKE 1 TABLET BY MOUTH EVERY 8 HOURS AS NEEDED FOR NAUSEA AND VOMITING    ondansetron (ZOFRAN-ODT) 8 MG TbDL Take 4 mg by mouth every 6 (six) hours as needed (nausea).    oxycodone-acetaminophen (PERCOCET) 5-325 mg per tablet Take 1-2 tablets by mouth every 4 (four) hours as needed for Pain.       Review of patient's allergies indicates:   Allergen Reactions    Plaquenil [hydroxychloroquine] Palpitations       Past Medical History:   Diagnosis Date    Acute ITP     Arthritis     Cancer     liver first round chemo 1/30/2017     Encounter for blood transfusion      Past Surgical History:   Procedure Laterality Date    COLONOSCOPY      SPLENECTOMY, TOTAL       Family History     Problem Relation (Age of Onset)    Cancer Mother, Father, Brother, Maternal Grandfather    Coronary artery disease Maternal Grandmother    Heart disease Sister, Mother    Rheum arthritis Sister        Social History Main Topics    Smoking status: Never Smoker    Smokeless tobacco: Never Used    Alcohol use No    Drug use: No    Sexual activity: Not on file     Review of Systems   Constitutional: Positive for appetite change, fatigue and unexpected weight change. Negative for chills and fever.   HENT: Negative for trouble swallowing.    Eyes: Negative.    Respiratory: Negative for cough and shortness of breath.    Cardiovascular: Negative for chest pain.   Gastrointestinal: Positive for abdominal pain. Negative for abdominal distention, blood in stool, constipation, diarrhea,  nausea and vomiting.   Genitourinary: Negative for difficulty urinating and dysuria.   Neurological: Negative for light-headedness.   Hematological: Does not bruise/bleed easily.     Objective:     Vital Signs (Most Recent):  Temp: 98.8 °F (37.1 °C) (06/29/17 0124)  Pulse: 89 (06/29/17 0144)  Resp: 18 (06/29/17 0124)  BP: 132/75 (06/29/17 0124)  SpO2: 97 % (06/29/17 0144) Vital Signs (24h Range):  Temp:  [98.8 °F (37.1 °C)] 98.8 °F (37.1 °C)  Pulse:  [89-98] 89  Resp:  [18] 18  SpO2:  [97 %-98 %] 97 %  BP: (132)/(75) 132/75     Weight: 48.5 kg (107 lb)  Body mass index is 18.95 kg/m².    Physical Exam   Constitutional: She appears well-nourished.  Non-toxic appearance. No distress.   HENT:   Head: Normocephalic and atraumatic.   Eyes: EOM are normal. No scleral icterus.   Neck: Normal range of motion.   Cardiovascular: Normal rate and regular rhythm.    Pulmonary/Chest: Effort normal. No respiratory distress.   Abdominal: Soft. Bowel sounds are normal. She exhibits no distension. There is tenderness (mild tenderness to RUQ and epigastrium). There is no rebound and no guarding. No hernia.   Musculoskeletal: Normal range of motion. She exhibits no edema.   Neurological: She is alert.   Psychiatric: She has a normal mood and affect.   Nursing note and vitals reviewed.      Significant Labs:  CBC:   Recent Labs  Lab 06/29/17  0152   WBC 7.87   RBC 3.53*   HGB 11.6*   HCT 33.2*   *   MCV 94   MCH 32.9*   MCHC 34.9     CMP:   Recent Labs  Lab 06/26/17  0957   *   CALCIUM 8.7   ALBUMIN 3.3   PROT 6.2      K 4.3   CO2 25.9      BUN 14   CREATININE 0.76   ALKPHOS 142*   AST 32   BILITOT 0.3       Significant Diagnostics:  CT: I have reviewed all pertinent results/findings within the past 24 hours and my personal findings are:  CT from outside hospital shows no acute intra-abdominal pathology with a normal appearing gallbladder and no biliary ductal dilatation.  There is a mass in the dome of the  liver that appears unchanged vs slightly increased in size as compared to prior study in March.  There is a very small amount of subcapsular fluid adjacent to the mass.

## 2017-06-29 NOTE — ED TRIAGE NOTES
Pt transferred from [place] for oncology/surgery eval. Pt reported low grade fever at home. Pt was supposed to start chemo last week, but was unable to due to fever and blood counts. CT at [place] showed bleeding around tumor in liver.

## 2017-06-29 NOTE — ED PROVIDER NOTES
Encounter Date: 6/29/2017    SCRIBE #1 NOTE: I, Claude Chu, am scribing for, and in the presence of,  Manjinder Kendrick MD. I have scribed the entire note.       History     Chief Complaint   Patient presents with    Transfer [facility]- Abdominal Pain     Pt has hx of liver cancer. Reported abdominal pain, CT scan showed bleeding around tumor in liver.      Time seen by provider: 1:39 AM    This is a 71 y.o. Female with PMH of Liver CA who presents as a transfer from [place] for further treatment and evaluation of known liver mass with surrounding hemorrhage. Pt complaining of abdominal pain associated with fever. Fever onset 2 days ago.  T Max 100.2 noted. Abdominal pain started yesterday, predominately to the upper right abdomen. Denies any CP, SOB, nausea, vomiting, dizziness, light headedness or any other sx. No further complaints or concerns at this time.       The history is provided by the patient, medical records and the EMS personnel.     Review of patient's allergies indicates:   Allergen Reactions    Plaquenil [hydroxychloroquine] Palpitations     Past Medical History:   Diagnosis Date    Acute ITP     Arthritis     Cancer     liver first round chemo 1/30/2017     Encounter for blood transfusion      Past Surgical History:   Procedure Laterality Date    COLONOSCOPY      SPLENECTOMY, TOTAL       Family History   Problem Relation Age of Onset    Rheum arthritis Sister     Heart disease Sister      MI    Cancer Mother      leukemia    Heart disease Mother     Cancer Father      brain and lung    Cancer Brother      lung    Coronary artery disease Maternal Grandmother     Cancer Maternal Grandfather      lung     Social History   Substance Use Topics    Smoking status: Never Smoker    Smokeless tobacco: Never Used    Alcohol use No     Review of Systems   Constitutional: Positive for fever.   HENT: Negative for sore throat.    Respiratory: Negative for shortness of breath.    Cardiovascular:  Negative for chest pain.   Gastrointestinal: Positive for abdominal pain (RUQ). Negative for nausea and vomiting.   Genitourinary: Negative for dysuria.   Musculoskeletal: Negative for back pain.   Skin: Negative for rash.   Neurological: Negative for dizziness, weakness and light-headedness.   Hematological: Does not bruise/bleed easily.       Physical Exam     Initial Vitals [06/29/17 0124]   BP Pulse Resp Temp SpO2   132/75 98 18 98.8 °F (37.1 °C) 98 %      MAP       94         Physical Exam    Nursing note and vitals reviewed.  Constitutional: She is not diaphoretic. No distress.   Pt is cachectic   HENT:   Head: Normocephalic and atraumatic.   Eyes: EOM are normal. Pupils are equal, round, and reactive to light.   Neck: Normal range of motion. Neck supple.   Cardiovascular: Normal rate and regular rhythm. Exam reveals no gallop and no friction rub.    No murmur heard.  Pulmonary/Chest: Breath sounds normal. No respiratory distress. She has no wheezes. She has no rhonchi. She has no rales.   Abdominal: Soft. She exhibits no distension. There is tenderness (Moderate RUQ TTP). There is no rebound and no guarding.   Musculoskeletal: Normal range of motion. She exhibits no edema or tenderness.   Neurological: She is alert and oriented to person, place, and time. She has normal strength. No cranial nerve deficit or sensory deficit.   Skin: Skin is warm and dry. No rash noted. No erythema.   Psychiatric: She has a normal mood and affect. Her behavior is normal. Judgment and thought content normal.         ED Course   Procedures  Labs Reviewed   CBC W/ AUTO DIFFERENTIAL - Abnormal; Notable for the following:        Result Value    RBC 3.53 (*)     Hemoglobin 11.6 (*)     Hematocrit 33.2 (*)     MCH 32.9 (*)     RDW 15.9 (*)     Platelets 424 (*)     Mono # 1.9 (*)     Mono% 23.6 (*)     All other components within normal limits   PROTIME-INR   TYPE & SCREEN             Medical Decision Making:   History:   Old Medical  Records: I decided to obtain old medical records.  Old Records Summarized: other records.       <> Summary of Records: Reviewed records from outside facility. CT scan shows large known liver mass with surrounding hemorrhage. Was given dose of Levaquin. H&H was 11 and 33. WBC count was 10. Platelets normal. Sodium low at 126. Negative urine. Negative CXR.   Initial Assessment:   Pt with two separate complaints. First she is complaining of fever with no clear source. Secondly she had RUQ ultrasound that shows a bleed into hepatic mass. Will consult general surgery and admit to observation  Differential Diagnosis:   Liver mass  Hepatic hemorrhage  Anemia   Neutropenic fever  Clinical Tests:   Lab Tests: Ordered and Reviewed  ED Management:  4:39 AM: Surgery evaluated pt. They do no feel there is an acute surgical intervention needed at this time and recommend admission for her hyponatremia and or fever, but not necessarily for her hepatic hemorrhage. Discussed with Dr. Villanueva who recommends transfer back to [facility]. Pt accepted at [facility] by Dr. Delgado.   Other:   I have discussed this case with another health care provider.       <> Summary of the Discussion: Surg, IM            Scribe Attestation:   Scribe #1: I performed the above scribed service and the documentation accurately describes the services I performed. I attest to the accuracy of the note.    Attending Attestation:           Physician Attestation for Scribe:  Physician Attestation Statement for Scribe #1: I, Manjinder Kendrick MD, reviewed documentation, as scribed by Claude Chu in my presence, and it is both accurate and complete.                 ED Course     Clinical Impression:   The primary encounter diagnosis was Hyponatremia. Diagnoses of Liver hematoma, initial encounter and Fever, unspecified fever cause were also pertinent to this visit.    Disposition:   Disposition: Transferred  Condition: Stable                        Manjinder  Aroldo MULLINS MD  06/29/17 0504       Manjinder Kendrick III, MD  06/29/17 0502

## 2017-06-29 NOTE — ASSESSMENT & PLAN NOTE
-The pain the patient is experiencing could be related to her tumor  -While there is some small amount of subcapsular fluid adjacent to her tumor, her H&H is stable compared to prior and when comparing labs at OSH to labs obtained on arrival here.  -She is afebrile with no leukocytosis, left shift, or bandemia to suggest etiology of subcapsular fluid to be abscess  -No acute surgical indication.

## 2017-06-29 NOTE — CONSULTS
[facility]  General Surgery  Consult Note    Patient Name: Aurelia Massey  MRN: [id number]  Code Status: Prior  Admission Date: 6/29/2017  Hospital Length of Stay: 0 days  Attending Physician: Manjinder Kendrick III, MD  Primary Care Provider: Avinash Rogers MD    Patient information was obtained from patient, spouse/SO, past medical records and ER records.     Inpatient consult to General surgery  Consult performed by: LIDA PATTERSON JR.  Consult ordered by: MANJINDER KENDRICK III  Reason for consult: carcinoma of liver with question of hemorrhagic conversion        Subjective:     Principal Problem: <principal problem not specified>    History of Present Illness: Aurelia Massey is a 71 y.o. 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  She reports that in December of last year, she presented with similar symptoms of right upper quadrant and epigastric abdominal pain.  Workup revealed a large hepatic mass, which was ultimately biopsied, showing a poorly differentiated carcinoma, suspected to be cholangiocarcinoma.  She was started on chemotherapy with some initial decrease in size of the mass; however, she has not been able to complete all of her chemotherapy to date due to poor tolerance.  She states that since her diagnosis, she has had episodes of intermittent pain in the RUQ and epigastric area, which will ultimately be relieved on there own; however, today the pain just would not go away like it normally would.  The pain is not related to diet.  She reports a low grade fever of 100.2 3 days ago, which kept her from getting her scheduled chemo.  She has a low appetite and has lost 20 pounds since diagnosis.  She denies nausea, vomiting, trouble swallowing, yellowing of skin or eyes, dark urine, pale stool,  diarrhea, constipation, blood in stool, dysuria.  A CT scan was obtained at the OSH, which showed concern for hemorrhage of the hepatic tumor, though her H&H was stable compared to prior.  She was transferred to [facility] for higher level of care.    No current facility-administered medications on file prior to encounter.      Current Outpatient Prescriptions on File Prior to Encounter   Medication Sig    calcium-vitamin D3 (CALCIUM 500 + D) 500 mg(1,250mg) -200 unit per tablet Take 2 tablets by mouth every other day.    levoFLOXacin (LEVAQUIN) 500 MG tablet Take 1 tablet (500 mg total) by mouth once daily.    multivitamin capsule Take 1 capsule by mouth once daily.    ondansetron (ZOFRAN) 8 MG tablet TAKE 1 TABLET BY MOUTH EVERY 8 HOURS AS NEEDED FOR NAUSEA AND VOMITING    ondansetron (ZOFRAN-ODT) 8 MG TbDL Take 4 mg by mouth every 6 (six) hours as needed (nausea).    oxycodone-acetaminophen (PERCOCET) 5-325 mg per tablet Take 1-2 tablets by mouth every 4 (four) hours as needed for Pain.       Review of patient's allergies indicates:   Allergen Reactions    Plaquenil [hydroxychloroquine] Palpitations       Past Medical History:   Diagnosis Date    Acute ITP     Arthritis     Cancer     liver first round chemo 1/30/2017     Encounter for blood transfusion      Past Surgical History:   Procedure Laterality Date    COLONOSCOPY      SPLENECTOMY, TOTAL       Family History     Problem Relation (Age of Onset)    Cancer Mother, Father, Brother, Maternal Grandfather    Coronary artery disease Maternal Grandmother    Heart disease Sister, Mother    Rheum arthritis Sister        Social History Main Topics    Smoking status: Never Smoker    Smokeless tobacco: Never Used    Alcohol use No    Drug use: No    Sexual activity: Not on file     Review of Systems   Constitutional: Positive for appetite change, fatigue and unexpected weight change. Negative for chills and fever.   HENT: Negative for trouble swallowing.     Eyes: Negative.    Respiratory: Negative for cough and shortness of breath.    Cardiovascular: Negative for chest pain.   Gastrointestinal: Positive for abdominal pain. Negative for abdominal distention, blood in stool, constipation, diarrhea, nausea and vomiting.   Genitourinary: Negative for difficulty urinating and dysuria.   Neurological: Negative for light-headedness.   Hematological: Does not bruise/bleed easily.     Objective:     Vital Signs (Most Recent):  Temp: 98.8 °F (37.1 °C) (06/29/17 0124)  Pulse: 89 (06/29/17 0144)  Resp: 18 (06/29/17 0124)  BP: 132/75 (06/29/17 0124)  SpO2: 97 % (06/29/17 0144) Vital Signs (24h Range):  Temp:  [98.8 °F (37.1 °C)] 98.8 °F (37.1 °C)  Pulse:  [89-98] 89  Resp:  [18] 18  SpO2:  [97 %-98 %] 97 %  BP: (132)/(75) 132/75     Weight: 48.5 kg (107 lb)  Body mass index is 18.95 kg/m².    Physical Exam   Constitutional: She appears well-nourished.  Non-toxic appearance. No distress.   HENT:   Head: Normocephalic and atraumatic.   Eyes: EOM are normal. No scleral icterus.   Neck: Normal range of motion.   Cardiovascular: Normal rate and regular rhythm.    Pulmonary/Chest: Effort normal. No respiratory distress.   Abdominal: Soft. Bowel sounds are normal. She exhibits no distension. There is tenderness (mild tenderness to RUQ and epigastrium). There is no rebound and no guarding. No hernia.   Musculoskeletal: Normal range of motion. She exhibits no edema.   Neurological: She is alert.   Psychiatric: She has a normal mood and affect.   Nursing note and vitals reviewed.      Significant Labs:  CBC:   Recent Labs  Lab 06/29/17  0152   WBC 7.87   RBC 3.53*   HGB 11.6*   HCT 33.2*   *   MCV 94   MCH 32.9*   MCHC 34.9     CMP:   Recent Labs  Lab 06/26/17  0957   *   CALCIUM 8.7   ALBUMIN 3.3   PROT 6.2      K 4.3   CO2 25.9      BUN 14   CREATININE 0.76   ALKPHOS 142*   AST 32   BILITOT 0.3       Significant Diagnostics:  CT: I have reviewed all pertinent  results/findings within the past 24 hours and my personal findings are:  CT from outside hospital shows no acute intra-abdominal pathology with a normal appearing gallbladder and no biliary ductal dilatation.  There is a mass in the dome of the liver that appears unchanged vs slightly increased in size as compared to prior study in March.  There is a very small amount of subcapsular fluid adjacent to the mass.    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  -She is afebrile with no leukocytosis, left shift, or bandemia to suggest etiology of subcapsular fluid to be abscess  -No acute surgical indication.        Calculus of gallbladder without cholecystitis without obstruction    -No laboratory or imaging evidence of cholecystitis/choledocholithiasis; therefore, no need for urgent surgical intervention.          VTE Risk Mitigation     None          Thank you for your consult. Please call with questions    Sudhir Saul Jr., MD  General Surgery  [facility]

## 2017-06-29 NOTE — HPI
Aurelia Massey is a 71 y.o. 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  She reports that in December of last year, she presented with similar symptoms of right upper quadrant and epigastric abdominal pain.  Workup revealed a large hepatic mass, which was ultimately biopsied, showing a poorly differentiated carcinoma, suspected to be cholangiocarcinoma.  She was started on chemotherapy with some initial decrease in size of the mass; however, she has not been able to complete all of her chemotherapy to date due to poor tolerance.  She states that since her diagnosis, she has had episodes of intermittent pain in the RUQ and epigastric area, which will ultimately be relieved on there own; however, today the pain just would not go away like it normally would.  The pain is not related to diet.  She reports a low grade fever of 100.2 3 days ago, which kept her from getting her scheduled chemo.  She has a low appetite and has lost 20 pounds since diagnosis.  She denies nausea, vomiting, trouble swallowing, yellowing of skin or eyes, dark urine, pale stool, diarrhea, constipation, blood in stool, dysuria.  A CT scan was obtained at the OSH, which showed concern for hemorrhage of the hepatic tumor, though her H&H was stable compared to prior.  She was transferred to [facility] for higher level of care.

## 2017-07-13 ENCOUNTER — OFFICE VISIT (OUTPATIENT)
Dept: HEMATOLOGY/ONCOLOGY | Facility: CLINIC | Age: 72
End: 2017-07-13
Payer: MEDICARE

## 2017-07-13 VITALS
HEART RATE: 76 BPM | RESPIRATION RATE: 18 BRPM | SYSTOLIC BLOOD PRESSURE: 118 MMHG | BODY MASS INDEX: 18.78 KG/M2 | DIASTOLIC BLOOD PRESSURE: 64 MMHG | TEMPERATURE: 98 F | WEIGHT: 106 LBS

## 2017-07-13 DIAGNOSIS — C80.1 ADENOCARCINOMA OF UNKNOWN PRIMARY: Primary | ICD-10-CM

## 2017-07-13 PROCEDURE — 99214 OFFICE O/P EST MOD 30 MIN: CPT | Mod: ,,, | Performed by: INTERNAL MEDICINE

## 2017-07-13 PROCEDURE — 1159F MED LIST DOCD IN RCRD: CPT | Mod: ,,, | Performed by: INTERNAL MEDICINE

## 2017-07-13 RX ORDER — CYANOCOBALAMIN (VITAMIN B-12) 500 MCG
TABLET ORAL
COMMUNITY
End: 2017-08-08

## 2017-07-13 NOTE — PROGRESS NOTES
[facility] HEME/ONC PROGRESS NOTE      Subjective:       Patient ID:   Aurelia Massey  71 y.o. female.  1945      Chief Complaint:  Here to review chemo status    History of Present Illness:     Patient returns today for a regularly scheduled follow-up visit.   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  Due for chemo 5/30/17.  No c/o.  No  N/V/ D/ C.  No pain.            ROS:   GEN: normal without any fever, night sweats or weight loss  HEENT: normal with no HA's, sore throat, stiff neck, changes in vision  CV: normal with no CP, SOB, PND, CAMPBELL or orthopnea  PULM: normal with no SOB, cough, hemoptysis, sputum or pleuritic pain  GI: normal with no abdominal pain, nausea, vomiting, constipation, diarrhea, melanotic stools, BRBPR, or hematemesis  : normal with no hematuria, dysuria  BREAST: normal with no mass, discharge, pain  SKIN: normal with no rash, erythema, bruising, or swelling    Allergies:  Review of patient's allergies indicates:   Allergen Reactions    Plaquenil [hydroxychloroquine] Palpitations       Medications:    Current Outpatient Prescriptions:     calcium-vitamin D3 (CALCIUM 500 + D) 500 mg(1,250mg) -200 unit per tablet, Take 2 tablets by mouth every other day., Disp: , Rfl:     melatonin 1 mg Tab, Take by mouth., Disp: , Rfl:     multivitamin capsule, Take 1 capsule by mouth once daily., Disp: , Rfl:     ondansetron (ZOFRAN) 8 MG tablet, TAKE 1 TABLET BY MOUTH EVERY 8 HOURS AS NEEDED FOR NAUSEA AND VOMITING, Disp: , Rfl: 2    oxycodone-acetaminophen (PERCOCET) 5-325 mg per tablet, Take 1-2 tablets by mouth every 4 (four) hours as needed for Pain., Disp: 30 tablet, Rfl: 0    ondansetron (ZOFRAN-ODT) 8 MG TbDL, Take 4 mg by mouth every 6 (six) hours as needed (nausea)., Disp: , Rfl:       Objective:     Vitals:  Blood pressure 118/64, pulse 76, temperature 97.8 °F (36.6 °C), resp. rate 18, weight 48.1 kg (106  lb).    Physical Examination:   GEN: no apparent distress, comfortable; AAOx3  HEAD: atraumatic and normocephalic  EYES: no pallor, no icterus, PERRLA  ENT: OMM, no pharyngeal erythema, external ears WNL; no nasal discharge; no thrush  NECK: no masses, thyroid normal, trachea midline, no LAD/LN's, supple  CV: RRR with no murmur; normal pulse; normal S1 and S2; no pedal edema  CHEST: Normal respiratory effort; CTAB; normal breath sounds; no wheeze or crackles  ABDOM: nontender and nondistended; soft; normal bowel sounds; no rebound/guarding  MUSC/Skeletal: ROM normal; no crepitus; joints normal; no deformities or arthropathy  EXTREM: no clubbing, cyanosis, inflammation or swelling  SKIN: no rashes, lesions, ulcers, petechiae or subcutaneous nodules  : no valenzuela  NEURO: grossly intact; motor/sensory WNL; AAOx3; no tremors  PSYCH: normal mood, affect and behavior  LYMPH: normal cervical, supraclavicular, axillary and groin LN's            Labs:   Lab Results   Component Value Date    WBC 7.87 06/29/2017    HGB 11.6 (L) 06/29/2017    HCT 33.2 (L) 06/29/2017    MCV 94 06/29/2017     (H) 06/29/2017    CMP  Sodium   Date Value Ref Range Status   06/26/2017 140 134 - 144 mmol/L      Potassium   Date Value Ref Range Status   06/26/2017 4.3 3.5 - 5.0 mmol/L      Chloride   Date Value Ref Range Status   06/26/2017 107 98 - 110 mmol/L      CO2   Date Value Ref Range Status   06/26/2017 25.9 22.8 - 31.6 mmol/L      Glucose   Date Value Ref Range Status   06/26/2017 113 (H) 70 - 99 mg/dL      BUN, Bld   Date Value Ref Range Status   06/26/2017 14 8 - 20 mg/dL      Creatinine   Date Value Ref Range Status   06/26/2017 0.76 0.60 - 1.40 mg/dL      Calcium   Date Value Ref Range Status   06/26/2017 8.7 7.7 - 10.4 mg/dL      Total Protein   Date Value Ref Range Status   06/26/2017 6.2 6.0 - 8.2 g/dL      Albumin   Date Value Ref Range Status   06/26/2017 3.3 3.1 - 4.7 g/dL      Total Bilirubin   Date Value Ref Range Status    06/26/2017 0.3 0.3 - 1.0 mg/dL      Alkaline Phosphatase   Date Value Ref Range Status   06/26/2017 142 (H) 40 - 104 IU/L      AST   Date Value Ref Range Status   06/26/2017 32 10 - 40 IU/L      ALT   Date Value Ref Range Status   02/24/2017 21 10 - 44 U/L Final     Anion Gap   Date Value Ref Range Status   02/24/2017 9 8 - 16 mmol/L Final     eGFR if    Date Value Ref Range Status   02/24/2017 >60 >60 mL/min/1.73 m^2 Final     eGFR if non    Date Value Ref Range Status   02/24/2017 >60 >60 mL/min/1.73 m^2 Final     Comment:     Calculation used to obtain the estimated glomerular filtration  rate (eGFR) is the CKD-EPI equation. Since race is unknown   in our information system, the eGFR values for   -American and Non--American patients are given   for each creatinine result.       I have reviewed all available lab results and radiology reports.    Radiology/Diagnostic Studies:          Assessment/Plan:   (1) 71 y.o. 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  Responding to chemo.  Continue chemo on 5/3/17.  Abraxane gemzar.  D1C5.              Discussion:     I have explained and the patient understands all of  the current recommendation(s). I have answered all of their questions to the best of my ability and to their complete satisfaction.   The patient is to continue with the current management plan.    RTC in  1 week.        Electronically signed by SADIA Whitehead         [facility] HEME/ONC PROGRESS NOTE      Subjective:       Patient ID:   Aurelia Massey  71 y.o. female.  1945      Chief Complaint:  Here to review chemo status    History of Present Illness:     Patient returns today for a regularly scheduled follow-up visit.   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  Due for chemo #6, Deferred until she sees   Jessika next week.  No c/o.  No  N/V/ D/ C.  No pain. Doing very well with improved energy and appetite.  ROS:   GEN: normal without any fever, night sweats or weight loss  HEENT: normal with no HA's, sore throat, stiff neck, changes in vision  CV: normal with no CP, SOB, PND, CAMPBELL or orthopnea  PULM: normal with no SOB, cough, hemoptysis, sputum or pleuritic pain  GI: See history of present illness.   no abdominal pain, nausea, vomiting, constipation, diarrhea, melanotic stools, BRBPR, or hematemesis  : normal with no hematuria, dysuria  BREAST: normal with no mass, discharge, pain  SKIN: normal with no rash, erythema, bruising, or swelling    Allergies:  Review of patient's allergies indicates:   Allergen Reactions    Plaquenil [hydroxychloroquine] Palpitations       Medications:    Current Outpatient Prescriptions:     calcium-vitamin D3 (CALCIUM 500 + D) 500 mg(1,250mg) -200 unit per tablet, Take 2 tablets by mouth every other day., Disp: , Rfl:     melatonin 1 mg Tab, Take by mouth., Disp: , Rfl:     multivitamin capsule, Take 1 capsule by mouth once daily., Disp: , Rfl:     ondansetron (ZOFRAN) 8 MG tablet, TAKE 1 TABLET BY MOUTH EVERY 8 HOURS AS NEEDED FOR NAUSEA AND VOMITING, Disp: , Rfl: 2    oxycodone-acetaminophen (PERCOCET) 5-325 mg per tablet, Take 1-2 tablets by mouth every 4 (four) hours as needed for Pain., Disp: 30 tablet, Rfl: 0    ondansetron (ZOFRAN-ODT) 8 MG TbDL, Take 4 mg by mouth every 6 (six) hours as needed (nausea)., Disp: , Rfl:       Objective:     Vitals:  Blood pressure 118/64, pulse 76, temperature 97.8 °F (36.6 °C), resp. rate 18, weight 48.1 kg (106 lb).    Physical Examination:   GEN: no apparent distress, comfortable; AAOx3  HEAD: atraumatic and normocephalic  EYES: no pallor, no icterus, PERRLA  ENT: OMM, no pharyngeal erythema,  no thrush  NECK: no masses, thyroid normal, no LAD/LN's, supple  CV: RRR with no murmur; normal pulse  CHEST: Normal respiratory effort; CTAB;  normal breath sounds; no wheeze or crackles  ABDOM: nontender and nondistended; soft; normal bowel sounds; no rebound/guarding,Liver and spleen are not palpable  MUSC/Skeletal: ROM normal; no crepitus; joints NL  EXTREM: no clubbing, cyanosis, inflammation or swelling  SKIN: no rashes, lesions, ulcers, petechiae   : no valenzuela  NEURO: grossly intact; motor/sensory WNL; AAOx3; no tremors  PSYCH: normal mood, affect and behavior  LYMPH: normal cervical, supraclavicular, axillary and groin LN's  BREASTS: Nontender without palpable mass at left and right breast            Labs:   Lab Results   Component Value Date    WBC 7.87 06/29/2017    HGB 11.6 (L) 06/29/2017    HCT 33.2 (L) 06/29/2017    MCV 94 06/29/2017     (H) 06/29/2017    CMP  Sodium   Date Value Ref Range Status   06/26/2017 140 134 - 144 mmol/L      Potassium   Date Value Ref Range Status   06/26/2017 4.3 3.5 - 5.0 mmol/L      Chloride   Date Value Ref Range Status   06/26/2017 107 98 - 110 mmol/L      CO2   Date Value Ref Range Status   06/26/2017 25.9 22.8 - 31.6 mmol/L      Glucose   Date Value Ref Range Status   06/26/2017 113 (H) 70 - 99 mg/dL      BUN, Bld   Date Value Ref Range Status   06/26/2017 14 8 - 20 mg/dL      Creatinine   Date Value Ref Range Status   06/26/2017 0.76 0.60 - 1.40 mg/dL      Calcium   Date Value Ref Range Status   06/26/2017 8.7 7.7 - 10.4 mg/dL      Total Protein   Date Value Ref Range Status   06/26/2017 6.2 6.0 - 8.2 g/dL      Albumin   Date Value Ref Range Status   06/26/2017 3.3 3.1 - 4.7 g/dL      Total Bilirubin   Date Value Ref Range Status   06/26/2017 0.3 0.3 - 1.0 mg/dL      Alkaline Phosphatase   Date Value Ref Range Status   06/26/2017 142 (H) 40 - 104 IU/L      AST   Date Value Ref Range Status   06/26/2017 32 10 - 40 IU/L      ALT   Date Value Ref Range Status   02/24/2017 21 10 - 44 U/L Final     Anion Gap   Date Value Ref Range Status   02/24/2017 9 8 - 16 mmol/L Final     eGFR if    Date  Value Ref Range Status   02/24/2017 >60 >60 mL/min/1.73 m^2 Final     eGFR if non    Date Value Ref Range Status   02/24/2017 >60 >60 mL/min/1.73 m^2 Final     Comment:     Calculation used to obtain the estimated glomerular filtration  rate (eGFR) is the CKD-EPI equation. Since race is unknown   in our information system, the eGFR values for   -American and Non--American patients are given   for each creatinine result.       I have reviewed available lab results and radiology reports.    Radiology/Diagnostic Studies:    CAT scan of abdomen from 2 weeks ago showed stable mass effect in the liver with area of hemorrhage in the mass. Study was done inpatient at [facility]      Assessment/Plan:   (1) 71 y.o. 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  Responding to chemo.  Continue chemo on 07/25/2017.  Abraxane gemzar.  D1C6.    (2) she is to see Dr. Rosen at [facility] on 07/21/2017 to be evaluated for intrahepatic therapy, Y90. She returns here in 1 week          Discussion:     I have explained and the patient understands all of  the current recommendation(s). I have answered all of their questions to the best of my ability and to their complete satisfaction.   The patient is to continue with the current management plan.    RTC in  1 week.        Electronically signed by SADIA Whitehead

## 2017-07-17 RX ORDER — SODIUM CHLORIDE 0.9 % (FLUSH) 0.9 %
10 SYRINGE (ML) INJECTION
Status: CANCELLED | OUTPATIENT
Start: 2017-07-18

## 2017-07-17 RX ORDER — HEPARIN 100 UNIT/ML
500 SYRINGE INTRAVENOUS
Status: CANCELLED | OUTPATIENT
Start: 2017-07-18

## 2017-07-18 ENCOUNTER — HISTORICAL (OUTPATIENT)
Dept: ADMINISTRATIVE | Facility: HOSPITAL | Age: 72
End: 2017-07-18

## 2017-07-18 LAB
ALBUMIN SERPL-MCNC: 3.1 G/DL (ref 3.1–4.7)
ALP SERPL-CCNC: 144 IU/L (ref 40–104)
ALT (SGPT): 13 IU/L (ref 3–33)
AST SERPL-CCNC: 29 IU/L (ref 10–40)
BASOPHILS NFR BLD: 0.1 K/UL (ref 0–0.2)
BASOPHILS NFR BLD: 1.3 %
BILIRUB SERPL-MCNC: 0.5 MG/DL (ref 0.3–1)
BUN SERPL-MCNC: 9 MG/DL (ref 8–20)
CALCIUM SERPL-MCNC: 8.7 MG/DL (ref 7.7–10.4)
CHLORIDE: 98 MMOL/L (ref 98–110)
CO2 SERPL-SCNC: 24.7 MMOL/L (ref 22.8–31.6)
CREATININE: 0.52 MG/DL (ref 0.6–1.4)
EOSINOPHIL NFR BLD: 0.4 K/UL (ref 0–0.7)
EOSINOPHIL NFR BLD: 4.2 %
ERYTHROCYTE [DISTWIDTH] IN BLOOD BY AUTOMATED COUNT: 14.6 % (ref 12.5–14.5)
GLUCOSE: 89 MG/DL (ref 70–99)
GRAN #: 2.9 K/UL (ref 1.4–6.5)
GRAN%: 34.2 %
HCT VFR BLD AUTO: 28.4 % (ref 36–48)
HGB BLD-MCNC: 10 G/DL (ref 12–15)
IMMATURE GRANS (ABS): 0 K/UL (ref 0–1)
IMMATURE GRANULOCYTES: 0.4 %
LYMPH #: 4.2 K/UL (ref 1.2–3.4)
LYMPH%: 50.2 %
MCH RBC QN AUTO: 32.6 PG (ref 25–35)
MCHC RBC AUTO-ENTMCNC: 35.2 G/DL (ref 31–36)
MCV RBC AUTO: 92.5 FL (ref 79–98)
MONO #: 0.8 K/UL (ref 0.1–0.6)
MONO%: 9.7 %
NUCLEATED RBCS: 0 %
NUCLEATED RED BLOOD CELLS: 0 /100 WBC
PERFORMED BY:: ABNORMAL
PLATELET # BLD AUTO: 325 K/UL (ref 140–440)
PMV BLD AUTO: 9.1 FL (ref 8.8–12.7)
POTASSIUM SERPL-SCNC: 3.8 MMOL/L (ref 3.5–5)
PROT SERPL-MCNC: 6.2 G/DL (ref 6–8.2)
RBC # BLD AUTO: 3.07 M/UL (ref 3.5–5.5)
SODIUM: 129 MMOL/L (ref 134–144)
WBC # BLD: 8.3 K/UL (ref 5–10)

## 2017-07-18 RX ORDER — HEPARIN 100 UNIT/ML
500 SYRINGE INTRAVENOUS
Status: CANCELLED | OUTPATIENT
Start: 2017-07-18

## 2017-07-18 RX ORDER — SODIUM CHLORIDE 0.9 % (FLUSH) 0.9 %
10 SYRINGE (ML) INJECTION
Status: CANCELLED | OUTPATIENT
Start: 2017-07-18

## 2017-07-19 LAB — CANCER AG19-9 SERPL-ACNC: 17 U/ML (ref 0–35)

## 2017-07-21 ENCOUNTER — OFFICE VISIT (OUTPATIENT)
Dept: NEUROLOGY | Facility: HOSPITAL | Age: 72
End: 2017-07-21
Attending: SURGERY
Payer: MEDICARE

## 2017-07-21 ENCOUNTER — HOSPITAL ENCOUNTER (OUTPATIENT)
Dept: RADIOLOGY | Facility: HOSPITAL | Age: 72
Discharge: HOME | End: 2017-07-21
Attending: SURGERY
Payer: MEDICARE

## 2017-07-21 VITALS
HEART RATE: 66 BPM | RESPIRATION RATE: 18 BRPM | WEIGHT: 106 LBS | BODY MASS INDEX: 18.78 KG/M2 | DIASTOLIC BLOOD PRESSURE: 66 MMHG | SYSTOLIC BLOOD PRESSURE: 119 MMHG | TEMPERATURE: 98 F | HEIGHT: 63 IN

## 2017-07-21 DIAGNOSIS — C22.0: ICD-10-CM

## 2017-07-21 DIAGNOSIS — K81.9 CHOLECYSTITIS: Primary | ICD-10-CM

## 2017-07-21 DIAGNOSIS — K81.9 CHOLECYSTITIS: ICD-10-CM

## 2017-07-21 DIAGNOSIS — K80.50 BILIARY COLIC: ICD-10-CM

## 2017-07-21 DIAGNOSIS — R10.11 RUQ PAIN: ICD-10-CM

## 2017-07-21 PROCEDURE — 78226 HEPATOBILIARY SYSTEM IMAGING: CPT | Mod: 26,,, | Performed by: RADIOLOGY

## 2017-07-21 PROCEDURE — 99215 OFFICE O/P EST HI 40 MIN: CPT | Mod: 25 | Performed by: SURGERY

## 2017-07-21 PROCEDURE — 78226 HEPATOBILIARY SYSTEM IMAGING: CPT | Mod: TC

## 2017-07-21 PROCEDURE — 63600175 PHARM REV CODE 636 W HCPCS: Performed by: RADIOLOGY

## 2017-07-21 RX ORDER — MORPHINE SULFATE 2 MG/ML
3 INJECTION, SOLUTION INTRAMUSCULAR; INTRAVENOUS ONCE
Status: COMPLETED | OUTPATIENT
Start: 2017-07-21 | End: 2017-07-21

## 2017-07-21 RX ORDER — HYDROCODONE BITARTRATE AND ACETAMINOPHEN 5; 325 MG/1; MG/1
1 TABLET ORAL EVERY 6 HOURS PRN
Status: ON HOLD | COMMUNITY
End: 2017-08-11

## 2017-07-21 RX ADMIN — MORPHINE SULFATE 3 MG: 2 INJECTION, SOLUTION INTRAMUSCULAR; INTRAVENOUS at 02:07

## 2017-07-21 NOTE — PROGRESS NOTES
"NOLANETS:  [facility]  A collaboration between [facility] and [facility]      PATIENT: Aurelia Massey  MRN: [id number]  DATE: 7/21/2017    Subjective:      Chief Complaint: surgical eval      Vitals:   Vitals:    07/21/17 1056   BP: 119/66   Pulse: 66   Resp: 18   Temp: 97.5 °F (36.4 °C)   TempSrc: Oral   Weight: 48.1 kg (106 lb)   Height: 5' 3" (1.6 m)        Karnofsky Score:     Diagnosis:   1. Cholecystitis    2. RUQ pain    3. Cholangiohepatoma    4. Biliary colic         Oncologic History:  Multifocal HCC, completed 3 cycles chemoTx with response. referred for liver directed therapy        Interval History:  recently seen in ER for abdominal pain, low grade temp.    Past Medical History:  Past Medical History:   Diagnosis Date    Acute ITP     Arthritis     Cancer     liver first round chemo 1/30/2017     Cholangiocarcinoma     Encounter for blood transfusion        Past Surgical History:  Past Surgical History:   Procedure Laterality Date    COLONOSCOPY      SPLENECTOMY, TOTAL         Family History:  Family History   Problem Relation Age of Onset    Rheum arthritis Sister     Heart disease Sister      MI    Cancer Mother      leukemia    Heart disease Mother     Cancer Father      brain and lung    Cancer Brother      lung    Coronary artery disease Maternal Grandmother     Cancer Maternal Grandfather      lung       Allergies:  Review of patient's allergies indicates:   Allergen Reactions    Plaquenil [hydroxychloroquine] Palpitations       Medications:  Current Outpatient Prescriptions   Medication Sig Dispense Refill    hydrocodone-acetaminophen 5-325mg (NORCO) 5-325 mg per tablet Take 1 tablet by mouth every 6 (six) hours as needed for Pain.      INTRAVENOUS EQUIPMENT (CHEMO-PIN MISC) by Misc.(Non-Drug; Combo Route) route.      multivitamin capsule Take 1 capsule by mouth once daily.      " ondansetron (ZOFRAN) 8 MG tablet TAKE 1 TABLET BY MOUTH EVERY 8 HOURS AS NEEDED FOR NAUSEA AND VOMITING  2    ondansetron (ZOFRAN-ODT) 8 MG TbDL Take 4 mg by mouth every 6 (six) hours as needed (nausea).      calcium-vitamin D3 (CALCIUM 500 + D) 500 mg(1,250mg) -200 unit per tablet Take 2 tablets by mouth every other day.      melatonin 1 mg Tab Take by mouth.       No current facility-administered medications for this visit.        Review of Systems   Constitutional: Positive for appetite change. Negative for activity change, chills, fatigue, fever and unexpected weight change.   HENT: Negative.  Negative for congestion, ear pain, rhinorrhea and sinus pressure.    Eyes: Negative.  Negative for photophobia, pain and redness.   Respiratory: Negative.  Negative for cough, chest tightness, shortness of breath and wheezing.    Cardiovascular: Negative for chest pain, palpitations and leg swelling.   Gastrointestinal: Positive for abdominal pain (RUQ). Negative for abdominal distention, anal bleeding, blood in stool, constipation, diarrhea, nausea, rectal pain and vomiting.   Endocrine: Negative.  Negative for cold intolerance, heat intolerance, polydipsia, polyphagia and polyuria.   Genitourinary: Negative.  Negative for difficulty urinating, dysuria and frequency.   Musculoskeletal: Negative.  Negative for arthralgias, back pain, gait problem, myalgias, neck pain and neck stiffness.   Skin: Negative.  Negative for color change, pallor and rash.   Allergic/Immunologic: Negative.  Negative for environmental allergies, food allergies and immunocompromised state.   Neurological: Negative.  Negative for dizziness, seizures, syncope, weakness and light-headedness.   Hematological: Negative.  Negative for adenopathy. Does not bruise/bleed easily.   Psychiatric/Behavioral: Negative.  Negative for agitation, behavioral problems, confusion, decreased concentration, dysphoric mood, hallucinations, self-injury, sleep  disturbance and suicidal ideas. The patient is not nervous/anxious and is not hyperactive.       Objective:      Physical Exam   Constitutional: She is oriented to person, place, and time. She appears well-developed. No distress.   Thin     HENT:   Head: Normocephalic and atraumatic.   Mouth/Throat: Oropharynx is clear and moist.   Eyes: EOM are normal. Pupils are equal, round, and reactive to light. No scleral icterus.   Neck: Normal range of motion. Neck supple. No JVD present. No tracheal deviation present.   Cardiovascular: Normal rate and regular rhythm.  Exam reveals no gallop.    No murmur heard.  Pulmonary/Chest: Effort normal. No respiratory distress. She has no wheezes. She has no rales.   Abdominal: Soft. Bowel sounds are normal. She exhibits no distension and no mass. Tenderness: RUQ over liver. no hepatomegalymild Hicks sign. There is no rebound and no guarding. No hernia. Hernia confirmed negative in the ventral area.   Musculoskeletal: Normal range of motion. She exhibits no edema or tenderness.   Neurological: She is alert and oriented to person, place, and time. No cranial nerve deficit.   Skin: Skin is warm, dry and intact. No rash noted. She is not diaphoretic. No erythema. No pallor.   allopecia   Psychiatric: She has a normal mood and affect. Her behavior is normal. Thought content normal.   Nursing note and vitals reviewed.     Assessment:       1. Cholecystitis    2. RUQ pain    3. Cholangiohepatoma    4. Biliary colic        Laboratory Data:    Neuroendocrine Labs Latest Ref Rng & Units 7/21/2017 7/18/2017 7/13/2017 6/29/2017 6/29/2017 6/26/2017 6/21/2017   CA 19-9 0 - 35 U/mL - 17 - - - - 21   WBC 3.90 - 12.70 K/uL - - - 7.87 - - -   HGB 12.0 - 15.0 g/dl - 10.0(L) - 11.6(L) - 10.4(L) 11.1(L)   HCT 36.0 - 48.0 % - 28.4(L) - 33.2(L) - 31.9(L) 34.9(L)   PLATLETS 140 - 440 K/ul - 325 - 424(H) - 403 408   PT 9.0 - 12.5 sec - - - 11.7 - - -   PTT 21.0 - 32.0 sec - - - - - - -   INR 0.8 - 1.2 - -  - 1.1 - - -   GLUCOSE 70 - 110 mg/dL - - - - - - -   BUN 8 - 20 mg/dL - 9 - - - 14 17   CREATININE 0.5 - 1.4 mg/dL - - - - - - -    - 145 mmol/L - - - - - - -   K 3.5 - 5.0 mmol/L - 3.8 - - - 4.3 4.5   CHLORIDE 95 - 110 mmol/L - - - - - - -   CO2 22.8 - 31.6 mmol/L - 24.7 - - - 25.9 24.9   CALCIUM 7.7 - 10.4 mg/dL - 8.7 - - - 8.7 9.0   PROTEIN, TOTAL 6.0 - 8.2 g/dL - 6.2 - - - 6.2 6.7   ALBUMIN 3.5 - 5.2 g/dL - - - - - - -   TOTAL BILIRUBIN 0.3 - 1.0 mg/dL - 0.5 - - - 0.3 0.5   DIRECT BILIRUBIN 0.1 - 0.3 mg/dL - - - - - - -   ALK PHOSPHATASE 40 - 104 IU/L - 144(H) - - - 142(H) 137(H)   SGOT (AST) 10 - 40 IU/L - 29 - - - 32 37   SGPT (ALT) 10 - 44 U/L - - - - - - -   Weight - 106 lbs - 106 lbs - 107 lbs - -   Sedimentation Rate, Manual 0 - 20 mm/Hr - - - - - - -     Scans/images reviewed.  CT 5/1/17:  IMPRESSION: Focal heterogeneous lesion in the dome of the liver which is  overall decreased in size compared to the prior scan of 03/10/2027 although  there is an increasing enhancing focus centrally. Other hypoattenuating foci in  the liver are either stable are decreased in size.    Stable hypoattenuating area posterior and superior to the pancreas likely  representing peripancreatic lymphadenopathy.    Probable cholesterol gallstones. Recommend abdominal ultrasound for further  evaluation.    Peripheral zone of increased attenuation in the uterine fundus with a central  zone of homogeneous relative decreased attenuation. Pelvic ultrasound is  recommended to further characterize.  PRE_RX Scan:       POST TX 6/28/17:      Impression: HCC responding to Rx  Possible LDT candidate   Gallstones,+/- symprtomatic  Plan:       Load CT scans into system ASAP  HIDA scan now.call results  Refer to Dr Bronson for  Y90 vs DEBTace  Case discussed with him.  Consider cholecystectomy if HIDA scan positive.  Moderate risk.      ADDENDUM:  HIDA scan positive,  Suspect cholecystitis/biliary colic.  Long d/w patietns  Recommend  :chemo holiday  Cardiology clearance  RTC 2 weeks to set up for cholecystectomy followed by Y 90 to liver  CEA, , AFP      MEMO Luther MD, FACS  Professor of Surgery, [facility]  Neuroendocrine Surgery, Hepatic/Pancreatic & General Surgery  [address], [address]  [place]  [address]  ph. [phone]; [phone]  fax. [phone]

## 2017-07-24 ENCOUNTER — TELEPHONE (OUTPATIENT)
Dept: HEMATOLOGY/ONCOLOGY | Facility: CLINIC | Age: 72
End: 2017-07-24

## 2017-07-24 NOTE — TELEPHONE ENCOUNTER
Called  pt back after reading Dr. Luther's office notes. Instructed cancelled all further chemos for now. Also spoke with Aron in scheduling.

## 2017-07-25 ENCOUNTER — TELEPHONE (OUTPATIENT)
Dept: NEUROLOGY | Facility: HOSPITAL | Age: 72
End: 2017-07-25

## 2017-07-25 NOTE — TELEPHONE ENCOUNTER
----- Message from Evelyn Covarrubias sent at 7/24/2017  3:51 PM CDT -----  Contact: Patients   JPB- Patient needs orders for her stress test. Dr Simón Villafuerte fax number [phone]. Patients call back number [phone]. Dr Villafuerte phone number [phone]

## 2017-07-25 NOTE — TELEPHONE ENCOUNTER
Returned call, explained to pt that she was being sent to her cardiologist for clearance, and that he would order the testing he requires for the clearance. She verbalized understanding.

## 2017-07-28 DIAGNOSIS — K81.9 CHOLECYSTITIS: Primary | ICD-10-CM

## 2017-07-28 RX ORDER — METRONIDAZOLE 500 MG/100ML
500 INJECTION, SOLUTION INTRAVENOUS
Status: CANCELLED | OUTPATIENT
Start: 2017-07-28

## 2017-07-28 RX ORDER — SODIUM CHLORIDE 9 MG/ML
INJECTION, SOLUTION INTRAVENOUS CONTINUOUS
Status: CANCELLED | OUTPATIENT
Start: 2017-07-28

## 2017-08-08 ENCOUNTER — ANESTHESIA EVENT (OUTPATIENT)
Dept: SURGERY | Facility: HOSPITAL | Age: 72
End: 2017-08-08
Payer: MEDICARE

## 2017-08-08 ENCOUNTER — HOSPITAL ENCOUNTER (OUTPATIENT)
Dept: PREADMISSION TESTING | Facility: HOSPITAL | Age: 72
Discharge: HOME | End: 2017-08-08
Attending: SURGERY
Payer: MEDICARE

## 2017-08-08 ENCOUNTER — OFFICE VISIT (OUTPATIENT)
Dept: NEUROLOGY | Facility: HOSPITAL | Age: 72
End: 2017-08-08
Attending: SURGERY
Payer: MEDICARE

## 2017-08-08 VITALS
DIASTOLIC BLOOD PRESSURE: 66 MMHG | SYSTOLIC BLOOD PRESSURE: 118 MMHG | BODY MASS INDEX: 18.21 KG/M2 | HEART RATE: 64 BPM | WEIGHT: 102.81 LBS | TEMPERATURE: 98 F | HEIGHT: 63 IN

## 2017-08-08 DIAGNOSIS — T45.1X5A ANEMIA DUE TO ANTINEOPLASTIC CHEMOTHERAPY: ICD-10-CM

## 2017-08-08 DIAGNOSIS — D64.81 ANEMIA DUE TO ANTINEOPLASTIC CHEMOTHERAPY: ICD-10-CM

## 2017-08-08 DIAGNOSIS — C22.0: Primary | ICD-10-CM

## 2017-08-08 DIAGNOSIS — K81.1 CHRONIC CHOLECYSTITIS: Primary | ICD-10-CM

## 2017-08-08 DIAGNOSIS — K80.20 CALCULUS OF GALLBLADDER WITHOUT CHOLECYSTITIS WITHOUT OBSTRUCTION: ICD-10-CM

## 2017-08-08 DIAGNOSIS — K80.50 BILIARY COLIC: ICD-10-CM

## 2017-08-08 DIAGNOSIS — C78.7 CHOLANGIOCARCINOMA METASTATIC TO LIVER: ICD-10-CM

## 2017-08-08 DIAGNOSIS — C22.0: ICD-10-CM

## 2017-08-08 DIAGNOSIS — C22.1 CHOLANGIOCARCINOMA METASTATIC TO LIVER: ICD-10-CM

## 2017-08-08 DIAGNOSIS — Z86.2 HISTORY OF ITP: ICD-10-CM

## 2017-08-08 DIAGNOSIS — R10.11 RUQ PAIN: ICD-10-CM

## 2017-08-08 DIAGNOSIS — K81.1 CHRONIC CHOLECYSTITIS: ICD-10-CM

## 2017-08-08 RX ORDER — SODIUM CHLORIDE, SODIUM LACTATE, POTASSIUM CHLORIDE, CALCIUM CHLORIDE 600; 310; 30; 20 MG/100ML; MG/100ML; MG/100ML; MG/100ML
INJECTION, SOLUTION INTRAVENOUS CONTINUOUS
Status: CANCELLED | OUTPATIENT
Start: 2017-08-08

## 2017-08-08 RX ORDER — LIDOCAINE HYDROCHLORIDE 10 MG/ML
1 INJECTION, SOLUTION EPIDURAL; INFILTRATION; INTRACAUDAL; PERINEURAL ONCE
Status: CANCELLED | OUTPATIENT
Start: 2017-08-08 | End: 2017-08-08

## 2017-08-08 NOTE — ANESTHESIA PREPROCEDURE EVALUATION
08/08/2017  Aurelia Massey is a 71 y.o., female with metastatic cholangiocarcinoma is scheduled for laparoscopic cholecystectomy under GETA on 8/10/2017.    Outside cardiology H&P and clearance from 7/31/2017 in EPIC Media section (and in pt chart).      Past Surgical History:   Procedure Laterality Date    COLONOSCOPY      SPLENECTOMY, TOTAL  1980's     .    Anesthesia Evaluation    I have reviewed the Patient Summary Reports.    I have reviewed the Nursing Notes.   I have reviewed the Medications.     Review of Systems  Anesthesia Hx:  No problems with previous Anesthesia  History of prior surgery of interest to airway management or planning: Previous anesthesia: General, MAC   Procedure performed at an [facility].  Denies Personal Hx of Anesthesia complications.   Social:  Non-Smoker    Hematology/Oncology:         -- Thrombocytopenia: Idiopathic Thrombocytopenic Purpura (ITP)  Current/Recent Cancer. (metastatice cholangiocarcinoma) chemotherapy and surgery  Oncology Comments: Last chemo approx 6/2017      EENT/Dental:EENT/Dental Normal   Cardiovascular:   Exercise tolerance: poor Denies Hypertension.  Denies Dysrhythmias.   Denies Angina. PVD        Pulmonary:   Denies Pneumonia (hx of HCAP in 1/2017 after starting chemo, now resolved) Denies Shortness of breath.    Renal/:  Renal/ Normal     Hepatic/GI:   Liver Disease, (mets to liver)    Musculoskeletal:   Arthritis   Joint Disease:  Arthritis, Rheumatoid Arthritis (not currently on DMARDs) Has not had RA flare recently, hands mostly affect at this time; jaws not affected at this time Rheumatic Disease  Bone Disorders: Osteoporosis    Neurological:  Neurology Normal  Rheumatoid Arthritis (not currently on DMARDs)    Endocrine:  Endocrine Normal        Physical Exam  General:  Cachexia Pt appears frail and pale    Airway/Jaw/Neck:  Airway  Findings: Mouth Opening: Normal Tongue: Normal  General Airway Assessment: Adult  Mallampati: I  TM Distance: Normal, at least 6 cm        Eyes/Ears/Nose:  EYES/EARS/NOSE FINDINGS: Normal   Dental:  DENTAL FINDINGS: Normal   Chest/Lungs:  Chest/Lungs Clear    Heart/Vascular:  Heart Findings: Normal Heart murmur: negative    Abdomen:  Abdomen Findings: Normal    Musculoskeletal:  Musculoskeletal Findings: (bilateral hands; right wrist) Tender Joint     Mental Status:  Mental Status Findings: Normal      EKG 6/29/2017  Normal sinus rhythm  Normal ECG  No previous ECGs available  Confirmed by LUZMARIA JUSTICE MD ([id number]) on 6/30/2017 9:39:34 AM    Outside 2D Echo (per outside cardiology note) 7/27/2017  Normal LV function  EF 62%   Trace MR  Mild MVP    Anesthesia Plan  Type of Anesthesia, risks & benefits discussed:  Anesthesia Type:  general  Patient's Preference:   Intra-op Monitoring Plan:   Intra-op Monitoring Plan Comments:   Post Op Pain Control Plan:   Post Op Pain Control Plan Comments:   Induction:   IV  Beta Blocker:  Patient is not currently on a Beta-Blocker (No further documentation required).       Informed Consent: Patient understands risks and agrees with Anesthesia plan.  Questions answered.   ASA Score: 3     Day of Surgery Review of History & Physical:        Anesthesia Plan Notes: Anesthesia consent will be obtained prior to procedure on 8/10/2017.    Outside cardiology H&P and clearance from 7/31/2017 in EPIC Media section (and in pt chart).          Ready For Surgery From Anesthesia Perspective.

## 2017-08-08 NOTE — DISCHARGE INSTRUCTIONS
Your surgery is scheduled for 8/10/17.    Please report to Outpatient Surgery Intake Office on the 2nd FLOOR at 8:30a.m.          INSTRUCTIONS IMPORTANT!!!  ¨ Do not eat or drink after 12 midnight-including water. OK to brush teeth, no   gum, candy or mints!          ____  Proceed to [facility] on 8/8/17 for additional blood test.        ____  Do not wear makeup, including mascara.  ____  No powder, lotions or creams to surgical area.  ____  Please remove all jewelry, including piercings and leave at home.  ____  No money or valuables needed. Please leave at home.  ____  Please bring any documents given by your doctor.  ____  If going home the same day, arrange for a ride home. You will not be able to             drive if Anesthesia was used.  ____  Wear loose fitting clothing. Allow for dressings, bandages.  ____  Stop Aspirin, Ibuprofen, Motrin and Aleve at least 3-5 days before surgery, unless otherwise instructed by your doctor, or the nurse.   You MAY use Tylenol/acetaminophen until day of surgery.  ____  Wash the surgical area with Hibiclens the night before surgery, and again the             morning of surgery.  Be sure to rinse hibiclens off completely (if instructed by nurse).  ____  If you take diabetic medication, do not take am of surgery unless instructed by Doctor.  ____  Call MD for temperature above 101 degrees.  ____ Stop taking any Fish Oil supplement or any Vitamins that contain Vitamin E at least 5 days prior to surgery.  ____ Do Not wear your contact lenses the day of your procedure.  You may wear your glasses.        I have read or had read and explained to me, and understand the above information.  Additional comments or instructions:  For additional questions call [phone]     Take a Hibiclens shower twice a day for 3 days prior to surgery, including the morning of surgery.   Gargle with Listerine twice a day for 3 days prior to surgery, including the morning of  surgery.      Pre-Op Bathing Instructions    Before surgery, you can play an important role in your own health.    Because skin is not sterile, we need to be sure that your skin is as free of germs as possible. By following the instructions below, you can reduce the number of germs on your skin before surgery.    IMPORTANT: You will need to shower with a special soap called Hibiclens*, available at any pharmacy.  If you are allergic to Chlorhexidine (the antiseptic in Hibiclens), use an antibacterial soap such as Dial Soap for your preoperative shower.  You will shower with Hibiclens both the night before your surgery and the morning of your surgery.  Do not use Hibiclens on the head, face or genitals to avoid injury to those areas.    STEP #1: THE NIGHT BEFORE YOUR SURGERY     1. Do not shave the area of your body where your surgery will be performed.  2. Shower and wash your hair and body as usual with your normal soap and shampoo.  3. Rinse your hair and body thoroughly after you shower to remove all soap residue.  4. With your hand, apply one packet of Hibiclens soap to the surgical site.   5. Wash the site gently for five (5) minutes. Do not scrub your skin too hard.   6. Do not wash with your regular soap after Hibiclens is used.  7. Rinse your body thoroughly.  8. Pat yourself dry with a clean, soft towel.  9. Do not use lotion, cream, or powder.  10. Wear clean clothes.    STEP #2: THE MORNING OF YOUR SURGERY     1. Repeat Step #1.    * Not to be used by people allergic to Chlorhexidine.          Anesthesia: General Anesthesia  Youre due to have surgery. During surgery, youll be given medication called anesthesia. (It is also called anesthetic.) This will keep you comfortable and pain-free. Your anesthesia provider will use general anesthesia. This sheet tells you more about it.  What is general anesthesia?     You are watched continuously during your procedure by the anesthesia provider   General  anesthesia puts you into a state like deep sleep. It goes into the bloodstream (IV anesthetics), into the lungs (gas anesthetics), or both. You feel nothing during the procedure. You will not remember it. During the procedure, the anesthesia provider monitors you continuously. He or she checks your heart rate and rhythm, blood pressure, breathing, and blood oxygen.  · IV Anesthetics. IV anesthetics are given through an IV line in your arm. Theyre often given first. This is so you are asleep before a gas anesthetic is started. Some kinds of IV anesthetics relieve pain. Others relax you. Your doctor will decide which kind is best in your case.  · Gas Anesthetics. Gas anesthetics are breathed into the lungs. They are often used to keep you asleep. They can be given through a facemask or a tube placed in your larynx or trachea (breathing tube).  ¨ If you have a facemask, your anesthesia provider will most likely place it over your nose and mouth while youre still awake. Youll breathe oxygen through the mask as your IV anesthetic is started. Gas anesthetic may be added through the mask.  ¨ If you have a tube in the larynx or trachea, it will be inserted into your throat after youre asleep.  Anesthesia tools and medications  You will likely have:  · IV anesthetics. These are put into an IV line into your bloodstream.  · Gas anesthetics. You breathe these anesthetics into your lungs, where they pass into your bloodstream.  · Pulse oximeter. This is a small clip that is attached to the end of your finger. This measures your blood oxygen level.  · Electrocardiography leads (electrodes). These are small sticky pads that are placed on your chest. They record your heart rate and rhythm.  · Blood pressure cuff. This reads your blood pressure.  Risks and possible complications  General anesthesia has some risks. These include:  · Breathing problems  · Nausea and vomiting  · Sore throat or hoarseness (usually  temporary)  · Allergic reaction to the anesthetic  · Irregular heartbeat (rare)  · Cardiac arrest (rare)   Anesthesia safety  · Follow all instructions you are given for how long not to eat or drink before your procedure.  · Be sure your doctor knows what medications and drugs you take. This includes over-the-counter medications, herbs, supplements, alcohol or other drugs. You will be asked when those were last taken.  · Have an adult family member or friend drive you home after the procedure.  · For the first 24 hours after your surgery:  ¨ Do not drive or use heavy equipment.  ¨ Have a trusted family member or spouse make important decisions or sign documents.  ¨ Avoid alcohol.  ¨ Have a responsible adult stay with you. He or she can watch for problems and help keep you safe.  Date Last Reviewed: 10/16/2014  © 8115-5761 [employer]. [address] [place] [address]. All rights reserved. This information is not intended as a substitute for professional medical care. Always follow your healthcare professional's instructions.      Cholecystectomy     Clips close off the duct connecting the gallbladder to the bile duct. The gallbladder is then removed.     Youve had painful attacks caused by gallstones. To treat the problem, your healthcare provider wants to remove your gallbladder. This surgery is called cholecystectomy. Removing the gallbladder can relieve pain. It will also prevent future attacks. You can live a healthy life without your gallbladder. You may also be able to go back to eating foods you enjoyed before your gallbladder problems started.  Before your surgery  Be prepared:  · Tell your provider what medicines you take. Include those bought over the counter. Also include herbs or supplements. Be sure to mention if you take prescription blood thinners. This includes warfarin, clopidogrel, and aspirin.  · Have any tests your provider asks for, such as blood tests.  · Dont eat or  drink after midnight, the night before your surgery. This includes water, coffee, and mints. However, you may need to take some medicine with sips of water--talk with your healthcare provider.  The day of surgery  When you arrive, you will prepare for surgery:  · An IV line will be put into a vein in your arm or hand. This gives you fluids and medicine.  · An anesthesiologist will talk with you about anesthesia. This is medicine used to prevent pain. You will receive general anesthesia. This puts you into a state like deep sleep through the procedure.  During surgery  There are 2 methods for removing the gallbladder. Your healthcare provider will choose which method is best for you:  · Laparoscopic cholecystectomy. This is most common. During surgery, 2 to 4 small incisions are made. A thin tube with a camera is used. This is called a laparoscope. The scope is put through one of the incisions. It sends images to a video screen. Surgical tools are put through other incisions. The gallbladder is removed using the scope and these tools.  · Open cholecystectomy. One larger incision is made. The surgeon sees and works through this incision. Open surgery is most often used when scarring or other factors make it a better choice for you.  In some cases, safety requires a change from laparoscopic to open surgery during the procedure.  After surgery  You will be sent to a room to wake up from the anesthesia. You will likely go home the same day. In some cases, an overnight stay is needed. If you had open cholecystectomy, you may need to stay in the hospital for a few days. When you are released to go home, have a family member or friend ready to drive you.  Risks and possible complications of gallbladder surgery  All surgeries have risks. The risks of gallbladder surgery include:  · Bleeding  · Infection  · Injury to the common bile duct or nearby organs  · Blood clots in the legs  · Bile leaks  · Hernia at incision  site  · Pnemonia   Date Last Reviewed: 7/1/2016  © 4780-2863 [employer]. [address] [place] [address], [place] [address]. All rights reserved. This information is not intended as a substitute for professional medical care. Always follow your healthcare professional's instructions.

## 2017-08-08 NOTE — PROGRESS NOTES
"NOLANETS:  [facility]  A collaboration between [facility] and [facility]      PATIENT: Aurelia Massey  MRN: [id number]  DATE: 8/8/2017    Subjective:      Chief Complaint: Follow-up (to discuss surgery)      Vitals:   Vitals:    08/08/17 1238   BP: 118/66   BP Location: Left arm   Patient Position: Sitting   Pulse: 64   Temp: 97.7 °F (36.5 °C)   TempSrc: Oral   Weight: 46.6 kg (102 lb 12.8 oz)   Height: 5' 3" (1.6 m)        Karnofsky Score:     Diagnosis:   1. 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 Biliary colic    4. Calculus of gallbladder without cholecystitis without obstruction    5. RUQ pain    6. Chronic cholecystitis    7. History of ITP    8. Anemia due to antineoplastic chemotherapy         Oncologic History:  Multifocal HCC, completed 3 cycles chemoTx with response. referred for liver directed therapy        Interval History:  recently seen in ER for abdominal pain, low grade temp. W/u revealed cholelithiasis/biilary cholic and probable cholecystits, treated with antibiotics.  Still c/o RUQ pain.   Looks to be candidate fo rY 90 after cholecystectomy.  Cardiology clearance done:    Cardiac clearance:      Past Medical History:  Past Medical History:   Diagnosis Date    Acute ITP     Arthritis     Cancer     liver first round chemo 1/30/2017     Cholangiocarcinoma     Encounter for blood transfusion        Past Surgical History:  Past Surgical History:   Procedure Laterality Date    COLONOSCOPY      SPLENECTOMY, TOTAL         Family History:  Family History   Problem Relation Age of Onset    Rheum arthritis Sister     Heart disease Sister      MI    Cancer Mother      leukemia    Heart disease Mother     Cancer Father      brain and lung    Cancer Brother      lung    Coronary artery disease Maternal Grandmother     Cancer Maternal Grandfather      lung "       Allergies:  Review of patient's allergies indicates:   Allergen Reactions    Plaquenil [hydroxychloroquine] Palpitations       Medications:  Current Outpatient Prescriptions   Medication Sig Dispense Refill    calcium-vitamin D3 (CALCIUM 500 + D) 500 mg(1,250mg) -200 unit per tablet Take 2 tablets by mouth every other day.      hydrocodone-acetaminophen 5-325mg (NORCO) 5-325 mg per tablet Take 1 tablet by mouth every 6 (six) hours as needed for Pain.      melatonin 1 mg Tab Take by mouth.      multivitamin capsule Take 1 capsule by mouth once daily.      ondansetron (ZOFRAN) 8 MG tablet TAKE 1 TABLET BY MOUTH EVERY 8 HOURS AS NEEDED FOR NAUSEA AND VOMITING  2    ondansetron (ZOFRAN-ODT) 8 MG TbDL Take 4 mg by mouth every 6 (six) hours as needed (nausea).       No current facility-administered medications for this visit.        Review of Systems   Constitutional: Positive for appetite change. Negative for activity change, chills, fatigue, fever and unexpected weight change.   HENT: Negative.  Negative for congestion, ear pain, rhinorrhea and sinus pressure.    Eyes: Negative.  Negative for photophobia, pain and redness.   Respiratory: Negative.  Negative for cough, chest tightness, shortness of breath and wheezing.    Cardiovascular: Negative for chest pain, palpitations and leg swelling.   Gastrointestinal: Positive for abdominal pain (RUQ). Negative for abdominal distention, anal bleeding, blood in stool, constipation, diarrhea, nausea, rectal pain and vomiting.   Endocrine: Negative.  Negative for cold intolerance, heat intolerance, polydipsia, polyphagia and polyuria.   Genitourinary: Negative.  Negative for difficulty urinating, dysuria and frequency.   Musculoskeletal: Negative.  Negative for arthralgias, back pain, gait problem, myalgias, neck pain and neck stiffness.   Skin: Negative.  Negative for color change, pallor and rash.   Allergic/Immunologic: Negative.  Negative for environmental  allergies, food allergies and immunocompromised state.   Neurological: Negative.  Negative for dizziness, seizures, syncope, weakness and light-headedness.   Hematological: Negative.  Negative for adenopathy. Does not bruise/bleed easily.   Psychiatric/Behavioral: Negative.  Negative for agitation, behavioral problems, confusion, decreased concentration, dysphoric mood, hallucinations, self-injury, sleep disturbance and suicidal ideas. The patient is not nervous/anxious and is not hyperactive.       Objective:      Physical Exam   Constitutional: She is oriented to person, place, and time. She appears well-developed. No distress.   Thin     HENT:   Head: Normocephalic and atraumatic.   Mouth/Throat: Oropharynx is clear and moist.   Eyes: EOM are normal. Pupils are equal, round, and reactive to light. No scleral icterus.   Neck: Normal range of motion. Neck supple. No JVD present. No tracheal deviation present.   Cardiovascular: Normal rate and regular rhythm.  Exam reveals no gallop.    No murmur heard.  Pulmonary/Chest: Effort normal. No respiratory distress. She has no wheezes. She has no rales.   Abdominal: Soft. Bowel sounds are normal. She exhibits no distension and no mass. Tenderness: RUQ over liver. no hepatomegalymild Hicks sign. There is no rebound and no guarding. No hernia. Hernia confirmed negative in the ventral area.   Musculoskeletal: Normal range of motion. She exhibits no edema or tenderness.   Neurological: She is alert and oriented to person, place, and time. No cranial nerve deficit.   Skin: Skin is warm, dry and intact. No rash noted. She is not diaphoretic. No erythema. No pallor.   allopecia   Psychiatric: She has a normal mood and affect. Her behavior is normal. Thought content normal.   Nursing note and vitals reviewed.     Assessment:       1. 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 Biliary colic    4. Calculus of gallbladder without cholecystitis without  obstruction    5. RUQ pain    6. Chronic cholecystitis    7. History of ITP    8. Anemia due to antineoplastic chemotherapy        Laboratory Data:    Neuroendocrine Labs Latest Ref Rng & Units 8/8/2017 7/21/2017 7/18/2017 7/13/2017 6/29/2017 6/29/2017 6/26/2017   CA 19-9 0 - 35 U/mL - - 17 - - - -   WBC 3.90 - 12.70 K/uL - - - - 7.87 - -   HGB 12.0 - 15.0 g/dl - - 10.0(L) - 11.6(L) - 10.4(L)   HCT 36.0 - 48.0 % - - 28.4(L) - 33.2(L) - 31.9(L)   PLATLETS 140 - 440 K/ul - - 325 - 424(H) - 403   PT 9.0 - 12.5 sec - - - - 11.7 - -   PTT 21.0 - 32.0 sec - - - - - - -   INR 0.8 - 1.2 - - - - 1.1 - -   GLUCOSE 70 - 110 mg/dL - - - - - - -   BUN 8 - 20 mg/dL - - 9 - - - 14   CREATININE 0.5 - 1.4 mg/dL - - - - - - -    - 145 mmol/L - - - - - - -   K 3.5 - 5.0 mmol/L - - 3.8 - - - 4.3   CHLORIDE 95 - 110 mmol/L - - - - - - -   CO2 22.8 - 31.6 mmol/L - - 24.7 - - - 25.9   CALCIUM 7.7 - 10.4 mg/dL - - 8.7 - - - 8.7   PROTEIN, TOTAL 6.0 - 8.2 g/dL - - 6.2 - - - 6.2   ALBUMIN 3.5 - 5.2 g/dL - - - - - - -   TOTAL BILIRUBIN 0.3 - 1.0 mg/dL - - 0.5 - - - 0.3   DIRECT BILIRUBIN 0.1 - 0.3 mg/dL - - - - - - -   ALK PHOSPHATASE 40 - 104 IU/L - - 144(H) - - - 142(H)   SGOT (AST) 10 - 40 IU/L - - 29 - - - 32   SGPT (ALT) 10 - 44 U/L - - - - - - -   Weight - 102 lbs 13 oz 106 lbs - 106 lbs - 107 lbs -   Sedimentation Rate, Manual 0 - 20 mm/Hr - - - - - - -     Scans/images reviewed.  CT 5/1/17:  IMPRESSION: Focal heterogeneous lesion in the dome of the liver which is  overall decreased in size compared to the prior scan of 03/10/2027 although  there is an increasing enhancing focus centrally. Other hypoattenuating foci in  the liver are either stable are decreased in size.    Stable hypoattenuating area posterior and superior to the pancreas likely  representing peripancreatic lymphadenopathy.    Probable cholesterol gallstones. Recommend abdominal ultrasound for further  evaluation.    Peripheral zone of increased attenuation in  the uterine fundus with a central  zone of homogeneous relative decreased attenuation. Pelvic ultrasound is  recommended to further characterize.  PRE_RX Scan:       POST TX 6/28/17:      HIDA: HIDA scan positive,  Suspect cholecystitis/biliary colic.     Impression: HCC responding to Rx  Possible LDT candidate   Gallstones,+/- symprtomatic  Plan:            Long d/w patient  Recommend :chemo holiday  Cardiology clearance done  Lap chip  for cholecystectomy followed by Y 90 to liver after recovered.  Moderate risk.  Risks/benefits explained and accepted.  All questions answered.    MARY Luther MD, FACS  Professor of Surgery, [facility]  Neuroendocrine Surgery, Hepatic/Pancreatic & General Surgery  [address], [address]  [place]  [address]  ph. [phone]; [phone]  fax. [phone]

## 2017-08-08 NOTE — PATIENT INSTRUCTIONS
Patient Instructions          Take a Hibiclens shower twice a day for 3 days prior to surgery, including the morning of surgery.   Gargle with Listerine twice a day for 2 days prior to surgery, including the morning of surgery.      Today     Appointment with Anethesia   Pre [place] for Hospital Admission - Go to 1st floor of the hospital-admitting desk. You will do paper work, get blood drawn,  get x-rays and see Anesthesia at this time.     Tomorrow    CLEAR LIQUIDS all day   Do not eat or drink anything after midnight.                 8/10/2017   Hospital Admission for surgery.  Report to 2nd floor, Same Day Surgery desk at 7:00am   Surgery is scheduled for 9:00am        [facility] [address] [place]  [address]  [phone]      INFORMATION REGARDING YOUR PROCEDURE      We look forward to serving you and your family and appreciate that you have chosen [facility] for your healthcare needs. Before, during, and after your surgery, you will be cared for by skilled medical professionals. Our surgeons, anesthesiologists, nurses,  and other healthcare professionals will work with you and your family to ensure a safe, smooth and comfortable surgery and recovery.    In order to best meet your pre-admission needs, your surgeon or ordering physicians office will contact our Scheduling Office at [phone] and schedule a Pre-Procedure Appointment.  This should preferably be done 72 hours or greater before your scheduled procedure date.     During your pre-procedure visit your insurance will be verified for your procedure. You will meet with a Registered Nurse and an Anesthesiologist or Nurse Anesthetist. If tests are required, they will be performed during your visit. Please allow about one and a half hours for this visit.      You will need to bring the following information or items with you to your Pre-Procedure Appointment:    1.  Picture Identification  2.   Insurance Card  3.  Current list of medications to include name and dosage  4.  List of allergies  5.  Orders and any other forms your doctor has given to you  6.  Copies of lab results performed at other facilities. This may include blood work, EKGs or chest xrays.   These results can be faxed to [phone].    The Pre-Op Center Registration Desk is located on the first floor of the hospital ([address] [place] [address]) in the [facility].  The Pre-Operative Center is located on the second floor of the hospital. Someone will walk you from the registration area to the Pre-Operative Center.    Free parking is located in the front of the hospital and medical office building and is easily accessed from [address] and [address]. When you arrive, please check in at the main information desk of the hospital.    Please call Outpatient Surgery at [phone] after 12:00pm on the day before your procedure for your arrival time and updated procedure time.      Pre-Op Bathing Instructions    Before surgery, you can play an important role in your own health.    Because skin is not sterile, we need to be sure that your skin is as free of germs as possible. By following the instructions below, you can reduce the number of germs on your skin before surgery.    IMPORTANT: You will need to shower with a special soap called Hibiclens*, available at any pharmacy.  If you are allergic to Chlorhexidine (the antiseptic in Hibiclens), use an antibacterial soap such as Dial Soap for your preoperative shower.  You will shower with Hibiclens both the night before your surgery and the morning of your surgery.  Do not use Hibiclens on the head, face or genitals to avoid injury to those areas.    STEP #1: THE NIGHT BEFORE YOUR SURGERY     1. Do not shave the area of your body where your surgery will be performed.  2. Shower and wash your hair and body as usual with your normal soap and shampoo.  3. Rinse your hair and body  thoroughly after you shower to remove all soap residue.  4. With your hand, apply one packet of Hibiclens soap to the surgical site.   5. Wash the site gently for five (5) minutes. Do not scrub your skin too hard.   6. Do not wash with your regular soap after Hibiclens is used.  7. Rinse your body thoroughly.  8. Pat yourself dry with a clean, soft towel.  9. Do not use lotion, cream, or powder.  10. Wear clean clothes.    STEP #2: THE MORNING OF YOUR SURGERY     1. Repeat Step #1.    * Not to be used by people allergic to Chlorhexidine.

## 2017-08-09 NOTE — PLAN OF CARE
Dr. Luther notified of Sodium level of 125, contacted pt to drink chicken broth today as requested by Dr. Luther. Pt verbalized understanding.

## 2017-08-10 ENCOUNTER — ANESTHESIA (OUTPATIENT)
Dept: SURGERY | Facility: HOSPITAL | Age: 72
End: 2017-08-10
Payer: MEDICARE

## 2017-08-10 ENCOUNTER — HOSPITAL ENCOUNTER (OUTPATIENT)
Facility: HOSPITAL | Age: 72
Discharge: HOME | End: 2017-08-11
Attending: SURGERY | Admitting: SURGERY
Payer: MEDICARE

## 2017-08-10 ENCOUNTER — SURGERY (OUTPATIENT)
Age: 72
End: 2017-08-10

## 2017-08-10 DIAGNOSIS — C22.0 HEPATOCELLULAR CARCINOMA: ICD-10-CM

## 2017-08-10 DIAGNOSIS — K81.9 CHOLECYSTITIS: ICD-10-CM

## 2017-08-10 DIAGNOSIS — R16.0 LIVER MASS: Primary | ICD-10-CM

## 2017-08-10 DIAGNOSIS — C22.0: ICD-10-CM

## 2017-08-10 LAB
ALBUMIN SERPL BCP-MCNC: 3.4 G/DL
ALP SERPL-CCNC: 165 U/L
ALT SERPL W/O P-5'-P-CCNC: 10 U/L
ANION GAP SERPL CALC-SCNC: 13 MMOL/L
ANION GAP SERPL CALC-SCNC: 13 MMOL/L
AST SERPL-CCNC: 28 U/L
BASOPHILS # BLD AUTO: 0.07 K/UL
BASOPHILS NFR BLD: 0.7 %
BILIRUB SERPL-MCNC: 0.8 MG/DL
BUN SERPL-MCNC: 10 MG/DL
BUN SERPL-MCNC: 10 MG/DL
CALCIUM SERPL-MCNC: 9.2 MG/DL
CALCIUM SERPL-MCNC: 9.2 MG/DL
CHLORIDE SERPL-SCNC: 99 MMOL/L
CHLORIDE SERPL-SCNC: 99 MMOL/L
CO2 SERPL-SCNC: 18 MMOL/L
CO2 SERPL-SCNC: 18 MMOL/L
CREAT SERPL-MCNC: 0.8 MG/DL
CREAT SERPL-MCNC: 0.8 MG/DL
DIFFERENTIAL METHOD: ABNORMAL
EOSINOPHIL # BLD AUTO: 0.1 K/UL
EOSINOPHIL NFR BLD: 1 %
ERYTHROCYTE [DISTWIDTH] IN BLOOD BY AUTOMATED COUNT: 14 %
EST. GFR  (AFRICAN AMERICAN): >60 ML/MIN/1.73 M^2
EST. GFR  (AFRICAN AMERICAN): >60 ML/MIN/1.73 M^2
EST. GFR  (NON AFRICAN AMERICAN): >60 ML/MIN/1.73 M^2
EST. GFR  (NON AFRICAN AMERICAN): >60 ML/MIN/1.73 M^2
GLUCOSE SERPL-MCNC: 56 MG/DL
GLUCOSE SERPL-MCNC: 56 MG/DL
HCT VFR BLD AUTO: 34 %
HGB BLD-MCNC: 12 G/DL
LYMPHOCYTES # BLD AUTO: 4 K/UL
LYMPHOCYTES NFR BLD: 40.2 %
MCH RBC QN AUTO: 32.3 PG
MCHC RBC AUTO-ENTMCNC: 35.3 G/DL
MCV RBC AUTO: 92 FL
MONOCYTES # BLD AUTO: 1 K/UL
MONOCYTES NFR BLD: 10 %
NEUTROPHILS # BLD AUTO: 4.8 K/UL
NEUTROPHILS NFR BLD: 48 %
PLATELET # BLD AUTO: 439 K/UL
PMV BLD AUTO: 9 FL
POTASSIUM SERPL-SCNC: 3.7 MMOL/L
POTASSIUM SERPL-SCNC: 3.7 MMOL/L
PROT SERPL-MCNC: 7.3 G/DL
RBC # BLD AUTO: 3.71 M/UL
SODIUM SERPL-SCNC: 130 MMOL/L
SODIUM SERPL-SCNC: 130 MMOL/L
WBC # BLD AUTO: 10 K/UL

## 2017-08-10 PROCEDURE — 63600175 PHARM REV CODE 636 W HCPCS: Performed by: SURGERY

## 2017-08-10 PROCEDURE — 71000033 HC RECOVERY, INTIAL HOUR: Performed by: SURGERY

## 2017-08-10 PROCEDURE — 27201423 OPTIME MED/SURG SUP & DEVICES STERILE SUPPLY: Performed by: SURGERY

## 2017-08-10 PROCEDURE — 94761 N-INVAS EAR/PLS OXIMETRY MLT: CPT

## 2017-08-10 PROCEDURE — S0030 INJECTION, METRONIDAZOLE: HCPCS | Performed by: SURGERY

## 2017-08-10 PROCEDURE — 37000009 HC ANESTHESIA EA ADD 15 MINS: Performed by: SURGERY

## 2017-08-10 PROCEDURE — 27000221 HC OXYGEN, UP TO 24 HOURS

## 2017-08-10 PROCEDURE — 25000003 PHARM REV CODE 250: Performed by: SURGERY

## 2017-08-10 PROCEDURE — 85025 COMPLETE CBC W/AUTO DIFF WBC: CPT

## 2017-08-10 PROCEDURE — S5010 5% DEXTROSE AND 0.45% SALINE: HCPCS | Performed by: SURGERY

## 2017-08-10 PROCEDURE — 25000003 PHARM REV CODE 250: Performed by: NURSE ANESTHETIST, CERTIFIED REGISTERED

## 2017-08-10 PROCEDURE — G0378 HOSPITAL OBSERVATION PER HR: HCPCS

## 2017-08-10 PROCEDURE — 71000039 HC RECOVERY, EACH ADD'L HOUR: Performed by: SURGERY

## 2017-08-10 PROCEDURE — 25000003 PHARM REV CODE 250: Performed by: NURSE PRACTITIONER

## 2017-08-10 PROCEDURE — 88304 TISSUE EXAM BY PATHOLOGIST: CPT | Mod: 26,,, | Performed by: PATHOLOGY

## 2017-08-10 PROCEDURE — 63600175 PHARM REV CODE 636 W HCPCS: Performed by: NURSE ANESTHETIST, CERTIFIED REGISTERED

## 2017-08-10 PROCEDURE — 88307 TISSUE EXAM BY PATHOLOGIST: CPT | Mod: 26,,, | Performed by: PATHOLOGY

## 2017-08-10 PROCEDURE — 80053 COMPREHEN METABOLIC PANEL: CPT

## 2017-08-10 PROCEDURE — 36415 COLL VENOUS BLD VENIPUNCTURE: CPT

## 2017-08-10 PROCEDURE — 88307 TISSUE EXAM BY PATHOLOGIST: CPT | Performed by: PATHOLOGY

## 2017-08-10 PROCEDURE — 37000008 HC ANESTHESIA 1ST 15 MINUTES: Performed by: SURGERY

## 2017-08-10 PROCEDURE — 36000709 HC OR TIME LEV III EA ADD 15 MIN: Performed by: SURGERY

## 2017-08-10 PROCEDURE — C9290 INJ, BUPIVACAINE LIPOSOME: HCPCS | Performed by: SURGERY

## 2017-08-10 PROCEDURE — 36000708 HC OR TIME LEV III 1ST 15 MIN: Performed by: SURGERY

## 2017-08-10 RX ORDER — SODIUM CHLORIDE, SODIUM LACTATE, POTASSIUM CHLORIDE, CALCIUM CHLORIDE 600; 310; 30; 20 MG/100ML; MG/100ML; MG/100ML; MG/100ML
INJECTION, SOLUTION INTRAVENOUS CONTINUOUS
Status: DISCONTINUED | OUTPATIENT
Start: 2017-08-10 | End: 2017-08-10

## 2017-08-10 RX ORDER — HYDROMORPHONE HYDROCHLORIDE 2 MG/ML
0.2 INJECTION, SOLUTION INTRAMUSCULAR; INTRAVENOUS; SUBCUTANEOUS EVERY 5 MIN PRN
Status: ACTIVE | OUTPATIENT
Start: 2017-08-10 | End: 2017-08-10

## 2017-08-10 RX ORDER — BUPIVACAINE HYDROCHLORIDE 2.5 MG/ML
INJECTION, SOLUTION EPIDURAL; INFILTRATION; INTRACAUDAL
Status: DISCONTINUED | OUTPATIENT
Start: 2017-08-10 | End: 2017-08-10 | Stop reason: HOSPADM

## 2017-08-10 RX ORDER — ONDANSETRON 8 MG/1
8 TABLET, ORALLY DISINTEGRATING ORAL EVERY 8 HOURS PRN
Status: DISCONTINUED | OUTPATIENT
Start: 2017-08-10 | End: 2017-08-11 | Stop reason: HOSPADM

## 2017-08-10 RX ORDER — IBUPROFEN 600 MG/1
600 TABLET ORAL EVERY 6 HOURS PRN
Status: DISCONTINUED | OUTPATIENT
Start: 2017-08-10 | End: 2017-08-11 | Stop reason: HOSPADM

## 2017-08-10 RX ORDER — OXYCODONE AND ACETAMINOPHEN 5; 325 MG/1; MG/1
1 TABLET ORAL
Status: DISCONTINUED | OUTPATIENT
Start: 2017-08-10 | End: 2017-08-10 | Stop reason: HOSPADM

## 2017-08-10 RX ORDER — ROCURONIUM BROMIDE 10 MG/ML
INJECTION, SOLUTION INTRAVENOUS
Status: DISCONTINUED | OUTPATIENT
Start: 2017-08-10 | End: 2017-08-10

## 2017-08-10 RX ORDER — DIPHENHYDRAMINE HYDROCHLORIDE 50 MG/ML
25 INJECTION INTRAMUSCULAR; INTRAVENOUS EVERY 4 HOURS PRN
Status: DISCONTINUED | OUTPATIENT
Start: 2017-08-10 | End: 2017-08-11 | Stop reason: HOSPADM

## 2017-08-10 RX ORDER — SODIUM CHLORIDE 0.9 % (FLUSH) 0.9 %
3 SYRINGE (ML) INJECTION
Status: DISCONTINUED | OUTPATIENT
Start: 2017-08-10 | End: 2017-08-11 | Stop reason: HOSPADM

## 2017-08-10 RX ORDER — NALOXONE HCL 0.4 MG/ML
VIAL (ML) INJECTION
Status: DISPENSED
Start: 2017-08-10 | End: 2017-08-11

## 2017-08-10 RX ORDER — PHYSOSTIGMINE SALICYLATE 1 MG/ML
0.02 INJECTION INTRAVENOUS ONCE
Status: DISCONTINUED | OUTPATIENT
Start: 2017-08-10 | End: 2017-08-10

## 2017-08-10 RX ORDER — RAMELTEON 8 MG/1
8 TABLET ORAL NIGHTLY PRN
Status: DISCONTINUED | OUTPATIENT
Start: 2017-08-10 | End: 2017-08-11 | Stop reason: HOSPADM

## 2017-08-10 RX ORDER — DEXTROSE MONOHYDRATE AND SODIUM CHLORIDE 5; .45 G/100ML; G/100ML
INJECTION, SOLUTION INTRAVENOUS CONTINUOUS
Status: DISCONTINUED | OUTPATIENT
Start: 2017-08-10 | End: 2017-08-11

## 2017-08-10 RX ORDER — ACETAMINOPHEN 325 MG/1
650 TABLET ORAL EVERY 8 HOURS PRN
Status: DISCONTINUED | OUTPATIENT
Start: 2017-08-10 | End: 2017-08-11 | Stop reason: HOSPADM

## 2017-08-10 RX ORDER — ONDANSETRON 2 MG/ML
INJECTION INTRAMUSCULAR; INTRAVENOUS
Status: DISCONTINUED | OUTPATIENT
Start: 2017-08-10 | End: 2017-08-10

## 2017-08-10 RX ORDER — GLYCOPYRROLATE 0.2 MG/ML
INJECTION INTRAMUSCULAR; INTRAVENOUS
Status: DISCONTINUED | OUTPATIENT
Start: 2017-08-10 | End: 2017-08-10

## 2017-08-10 RX ORDER — PROPOFOL 10 MG/ML
VIAL (ML) INTRAVENOUS
Status: DISCONTINUED | OUTPATIENT
Start: 2017-08-10 | End: 2017-08-10

## 2017-08-10 RX ORDER — FENTANYL CITRATE 50 UG/ML
INJECTION, SOLUTION INTRAMUSCULAR; INTRAVENOUS
Status: DISCONTINUED | OUTPATIENT
Start: 2017-08-10 | End: 2017-08-10

## 2017-08-10 RX ORDER — MIDAZOLAM HYDROCHLORIDE 1 MG/ML
INJECTION, SOLUTION INTRAMUSCULAR; INTRAVENOUS
Status: DISCONTINUED | OUTPATIENT
Start: 2017-08-10 | End: 2017-08-10

## 2017-08-10 RX ORDER — DEXAMETHASONE SODIUM PHOSPHATE 4 MG/ML
INJECTION, SOLUTION INTRA-ARTICULAR; INTRALESIONAL; INTRAMUSCULAR; INTRAVENOUS; SOFT TISSUE
Status: DISCONTINUED | OUTPATIENT
Start: 2017-08-10 | End: 2017-08-10

## 2017-08-10 RX ORDER — SODIUM CHLORIDE 0.9 % (FLUSH) 0.9 %
3 SYRINGE (ML) INJECTION EVERY 8 HOURS
Status: DISCONTINUED | OUTPATIENT
Start: 2017-08-10 | End: 2017-08-11 | Stop reason: HOSPADM

## 2017-08-10 RX ORDER — HYDROCODONE BITARTRATE AND ACETAMINOPHEN 5; 325 MG/1; MG/1
1 TABLET ORAL EVERY 4 HOURS PRN
Status: DISCONTINUED | OUTPATIENT
Start: 2017-08-10 | End: 2017-08-11 | Stop reason: HOSPADM

## 2017-08-10 RX ORDER — ONDANSETRON 2 MG/ML
4 INJECTION INTRAMUSCULAR; INTRAVENOUS DAILY PRN
Status: DISCONTINUED | OUTPATIENT
Start: 2017-08-10 | End: 2017-08-10 | Stop reason: HOSPADM

## 2017-08-10 RX ORDER — ACETAMINOPHEN 10 MG/ML
INJECTION, SOLUTION INTRAVENOUS
Status: DISCONTINUED | OUTPATIENT
Start: 2017-08-10 | End: 2017-08-10

## 2017-08-10 RX ORDER — LIDOCAINE HYDROCHLORIDE 10 MG/ML
1 INJECTION, SOLUTION EPIDURAL; INFILTRATION; INTRACAUDAL; PERINEURAL ONCE
Status: DISCONTINUED | OUTPATIENT
Start: 2017-08-10 | End: 2017-08-10 | Stop reason: HOSPADM

## 2017-08-10 RX ORDER — METRONIDAZOLE 500 MG/100ML
500 INJECTION, SOLUTION INTRAVENOUS
Status: COMPLETED | OUTPATIENT
Start: 2017-08-10 | End: 2017-08-10

## 2017-08-10 RX ORDER — NEOSTIGMINE METHYLSULFATE 1 MG/ML
INJECTION, SOLUTION INTRAVENOUS
Status: DISCONTINUED | OUTPATIENT
Start: 2017-08-10 | End: 2017-08-10

## 2017-08-10 RX ORDER — LIDOCAINE HCL/PF 100 MG/5ML
SYRINGE (ML) INTRAVENOUS
Status: DISCONTINUED | OUTPATIENT
Start: 2017-08-10 | End: 2017-08-10

## 2017-08-10 RX ORDER — PHENYLEPHRINE HYDROCHLORIDE 10 MG/ML
INJECTION INTRAVENOUS
Status: DISCONTINUED | OUTPATIENT
Start: 2017-08-10 | End: 2017-08-10

## 2017-08-10 RX ORDER — SUCCINYLCHOLINE CHLORIDE 20 MG/ML
INJECTION INTRAMUSCULAR; INTRAVENOUS
Status: DISCONTINUED | OUTPATIENT
Start: 2017-08-10 | End: 2017-08-10

## 2017-08-10 RX ORDER — BACITRACIN 50000 [IU]/1
INJECTION, POWDER, FOR SOLUTION INTRAMUSCULAR
Status: DISCONTINUED | OUTPATIENT
Start: 2017-08-10 | End: 2017-08-10 | Stop reason: HOSPADM

## 2017-08-10 RX ORDER — ENOXAPARIN SODIUM 100 MG/ML
40 INJECTION SUBCUTANEOUS
Status: DISCONTINUED | OUTPATIENT
Start: 2017-08-11 | End: 2017-08-11 | Stop reason: HOSPADM

## 2017-08-10 RX ORDER — SODIUM CHLORIDE 9 MG/ML
INJECTION, SOLUTION INTRAVENOUS CONTINUOUS
Status: DISCONTINUED | OUTPATIENT
Start: 2017-08-10 | End: 2017-08-10

## 2017-08-10 RX ADMIN — SODIUM CHLORIDE, SODIUM LACTATE, POTASSIUM CHLORIDE, AND CALCIUM CHLORIDE: .6; .31; .03; .02 INJECTION, SOLUTION INTRAVENOUS at 08:08

## 2017-08-10 RX ADMIN — METRONIDAZOLE 500 MG: 500 SOLUTION INTRAVENOUS at 10:08

## 2017-08-10 RX ADMIN — ACETAMINOPHEN 1000 MG: 10 INJECTION, SOLUTION INTRAVENOUS at 10:08

## 2017-08-10 RX ADMIN — DEXAMETHASONE SODIUM PHOSPHATE 8 MG: 4 INJECTION, SOLUTION INTRAMUSCULAR; INTRAVENOUS at 10:08

## 2017-08-10 RX ADMIN — Medication 5 MG: at 10:08

## 2017-08-10 RX ADMIN — GLYCOPYRROLATE 0.6 MG: 0.2 INJECTION, SOLUTION INTRAMUSCULAR; INTRAVENOUS at 11:08

## 2017-08-10 RX ADMIN — PROPOFOL 140 MG: 10 INJECTION, EMULSION INTRAVENOUS at 10:08

## 2017-08-10 RX ADMIN — ONDANSETRON 8 MG: 2 INJECTION, SOLUTION INTRAMUSCULAR; INTRAVENOUS at 11:08

## 2017-08-10 RX ADMIN — FENTANYL CITRATE 50 MCG: 50 INJECTION, SOLUTION INTRAMUSCULAR; INTRAVENOUS at 10:08

## 2017-08-10 RX ADMIN — LIDOCAINE HYDROCHLORIDE 80 MG: 20 INJECTION, SOLUTION INTRAVENOUS at 10:08

## 2017-08-10 RX ADMIN — ROCURONIUM BROMIDE 5 MG: 10 INJECTION, SOLUTION INTRAVENOUS at 10:08

## 2017-08-10 RX ADMIN — BUPIVACAINE HYDROCHLORIDE 30 ML: 2.5 INJECTION, SOLUTION EPIDURAL; INFILTRATION; INTRACAUDAL; PERINEURAL at 08:08

## 2017-08-10 RX ADMIN — NEOSTIGMINE METHYLSULFATE 5 MG: 1 INJECTION INTRAVENOUS at 11:08

## 2017-08-10 RX ADMIN — PHENYLEPHRINE HYDROCHLORIDE 200 MCG: 10 INJECTION INTRAVENOUS at 10:08

## 2017-08-10 RX ADMIN — ROCURONIUM BROMIDE 25 MG: 10 INJECTION, SOLUTION INTRAVENOUS at 10:08

## 2017-08-10 RX ADMIN — DEXTROSE AND SODIUM CHLORIDE: 5; .45 INJECTION, SOLUTION INTRAVENOUS at 02:08

## 2017-08-10 RX ADMIN — CEFTRIAXONE 2 G: 2 INJECTION, SOLUTION INTRAVENOUS at 10:08

## 2017-08-10 RX ADMIN — BUPIVACAINE 20 ML: 13.3 INJECTION, SUSPENSION, LIPOSOMAL INFILTRATION at 08:08

## 2017-08-10 RX ADMIN — HYDROCODONE BITARTRATE AND ACETAMINOPHEN 1 TABLET: 5; 325 TABLET ORAL at 01:08

## 2017-08-10 RX ADMIN — BACITRACIN 50000 UNITS: 5000 INJECTION, POWDER, FOR SOLUTION INTRAMUSCULAR at 08:08

## 2017-08-10 RX ADMIN — SUCCINYLCHOLINE CHLORIDE 120 MG: 20 INJECTION, SOLUTION INTRAMUSCULAR; INTRAVENOUS at 10:08

## 2017-08-10 RX ADMIN — MIDAZOLAM 2 MG: 1 INJECTION INTRAMUSCULAR; INTRAVENOUS at 09:08

## 2017-08-10 RX ADMIN — FENTANYL CITRATE 100 MCG: 50 INJECTION, SOLUTION INTRAMUSCULAR; INTRAVENOUS at 10:08

## 2017-08-10 NOTE — H&P (VIEW-ONLY)
"NOLANETS:  [facility]  A collaboration between [facility] and [facility]      PATIENT: Aurelia Massey  MRN: [id number]  DATE: 7/21/2017    Subjective:      Chief Complaint: surgical eval      Vitals:   Vitals:    07/21/17 1056   BP: 119/66   Pulse: 66   Resp: 18   Temp: 97.5 °F (36.4 °C)   TempSrc: Oral   Weight: 48.1 kg (106 lb)   Height: 5' 3" (1.6 m)        Karnofsky Score:     Diagnosis:   1. Cholecystitis    2. RUQ pain    3. Cholangiohepatoma    4. Biliary colic         Oncologic History:  Multifocal HCC, completed 3 cycles chemoTx with response. referred for liver directed therapy        Interval History:  recently seen in ER for abdominal pain, low grade temp.    Past Medical History:  Past Medical History:   Diagnosis Date    Acute ITP     Arthritis     Cancer     liver first round chemo 1/30/2017     Cholangiocarcinoma     Encounter for blood transfusion        Past Surgical History:  Past Surgical History:   Procedure Laterality Date    COLONOSCOPY      SPLENECTOMY, TOTAL         Family History:  Family History   Problem Relation Age of Onset    Rheum arthritis Sister     Heart disease Sister      MI    Cancer Mother      leukemia    Heart disease Mother     Cancer Father      brain and lung    Cancer Brother      lung    Coronary artery disease Maternal Grandmother     Cancer Maternal Grandfather      lung       Allergies:  Review of patient's allergies indicates:   Allergen Reactions    Plaquenil [hydroxychloroquine] Palpitations       Medications:  Current Outpatient Prescriptions   Medication Sig Dispense Refill    hydrocodone-acetaminophen 5-325mg (NORCO) 5-325 mg per tablet Take 1 tablet by mouth every 6 (six) hours as needed for Pain.      INTRAVENOUS EQUIPMENT (CHEMO-PIN MISC) by Misc.(Non-Drug; Combo Route) route.      multivitamin capsule Take 1 capsule by mouth once daily.      " ondansetron (ZOFRAN) 8 MG tablet TAKE 1 TABLET BY MOUTH EVERY 8 HOURS AS NEEDED FOR NAUSEA AND VOMITING  2    ondansetron (ZOFRAN-ODT) 8 MG TbDL Take 4 mg by mouth every 6 (six) hours as needed (nausea).      calcium-vitamin D3 (CALCIUM 500 + D) 500 mg(1,250mg) -200 unit per tablet Take 2 tablets by mouth every other day.      melatonin 1 mg Tab Take by mouth.       No current facility-administered medications for this visit.        Review of Systems   Constitutional: Positive for appetite change. Negative for activity change, chills, fatigue, fever and unexpected weight change.   HENT: Negative.  Negative for congestion, ear pain, rhinorrhea and sinus pressure.    Eyes: Negative.  Negative for photophobia, pain and redness.   Respiratory: Negative.  Negative for cough, chest tightness, shortness of breath and wheezing.    Cardiovascular: Negative for chest pain, palpitations and leg swelling.   Gastrointestinal: Positive for abdominal pain (RUQ). Negative for abdominal distention, anal bleeding, blood in stool, constipation, diarrhea, nausea, rectal pain and vomiting.   Endocrine: Negative.  Negative for cold intolerance, heat intolerance, polydipsia, polyphagia and polyuria.   Genitourinary: Negative.  Negative for difficulty urinating, dysuria and frequency.   Musculoskeletal: Negative.  Negative for arthralgias, back pain, gait problem, myalgias, neck pain and neck stiffness.   Skin: Negative.  Negative for color change, pallor and rash.   Allergic/Immunologic: Negative.  Negative for environmental allergies, food allergies and immunocompromised state.   Neurological: Negative.  Negative for dizziness, seizures, syncope, weakness and light-headedness.   Hematological: Negative.  Negative for adenopathy. Does not bruise/bleed easily.   Psychiatric/Behavioral: Negative.  Negative for agitation, behavioral problems, confusion, decreased concentration, dysphoric mood, hallucinations, self-injury, sleep  disturbance and suicidal ideas. The patient is not nervous/anxious and is not hyperactive.       Objective:      Physical Exam   Constitutional: She is oriented to person, place, and time. She appears well-developed. No distress.   Thin     HENT:   Head: Normocephalic and atraumatic.   Mouth/Throat: Oropharynx is clear and moist.   Eyes: EOM are normal. Pupils are equal, round, and reactive to light. No scleral icterus.   Neck: Normal range of motion. Neck supple. No JVD present. No tracheal deviation present.   Cardiovascular: Normal rate and regular rhythm.  Exam reveals no gallop.    No murmur heard.  Pulmonary/Chest: Effort normal. No respiratory distress. She has no wheezes. She has no rales.   Abdominal: Soft. Bowel sounds are normal. She exhibits no distension and no mass. Tenderness: RUQ over liver. no hepatomegalymild Hicks sign. There is no rebound and no guarding. No hernia. Hernia confirmed negative in the ventral area.   Musculoskeletal: Normal range of motion. She exhibits no edema or tenderness.   Neurological: She is alert and oriented to person, place, and time. No cranial nerve deficit.   Skin: Skin is warm, dry and intact. No rash noted. She is not diaphoretic. No erythema. No pallor.   allopecia   Psychiatric: She has a normal mood and affect. Her behavior is normal. Thought content normal.   Nursing note and vitals reviewed.     Assessment:       1. Cholecystitis    2. RUQ pain    3. Cholangiohepatoma    4. Biliary colic        Laboratory Data:    Neuroendocrine Labs Latest Ref Rng & Units 7/21/2017 7/18/2017 7/13/2017 6/29/2017 6/29/2017 6/26/2017 6/21/2017   CA 19-9 0 - 35 U/mL - 17 - - - - 21   WBC 3.90 - 12.70 K/uL - - - 7.87 - - -   HGB 12.0 - 15.0 g/dl - 10.0(L) - 11.6(L) - 10.4(L) 11.1(L)   HCT 36.0 - 48.0 % - 28.4(L) - 33.2(L) - 31.9(L) 34.9(L)   PLATLETS 140 - 440 K/ul - 325 - 424(H) - 403 408   PT 9.0 - 12.5 sec - - - 11.7 - - -   PTT 21.0 - 32.0 sec - - - - - - -   INR 0.8 - 1.2 - -  - 1.1 - - -   GLUCOSE 70 - 110 mg/dL - - - - - - -   BUN 8 - 20 mg/dL - 9 - - - 14 17   CREATININE 0.5 - 1.4 mg/dL - - - - - - -    - 145 mmol/L - - - - - - -   K 3.5 - 5.0 mmol/L - 3.8 - - - 4.3 4.5   CHLORIDE 95 - 110 mmol/L - - - - - - -   CO2 22.8 - 31.6 mmol/L - 24.7 - - - 25.9 24.9   CALCIUM 7.7 - 10.4 mg/dL - 8.7 - - - 8.7 9.0   PROTEIN, TOTAL 6.0 - 8.2 g/dL - 6.2 - - - 6.2 6.7   ALBUMIN 3.5 - 5.2 g/dL - - - - - - -   TOTAL BILIRUBIN 0.3 - 1.0 mg/dL - 0.5 - - - 0.3 0.5   DIRECT BILIRUBIN 0.1 - 0.3 mg/dL - - - - - - -   ALK PHOSPHATASE 40 - 104 IU/L - 144(H) - - - 142(H) 137(H)   SGOT (AST) 10 - 40 IU/L - 29 - - - 32 37   SGPT (ALT) 10 - 44 U/L - - - - - - -   Weight - 106 lbs - 106 lbs - 107 lbs - -   Sedimentation Rate, Manual 0 - 20 mm/Hr - - - - - - -     Scans/images reviewed.  CT 5/1/17:  IMPRESSION: Focal heterogeneous lesion in the dome of the liver which is  overall decreased in size compared to the prior scan of 03/10/2027 although  there is an increasing enhancing focus centrally. Other hypoattenuating foci in  the liver are either stable are decreased in size.    Stable hypoattenuating area posterior and superior to the pancreas likely  representing peripancreatic lymphadenopathy.    Probable cholesterol gallstones. Recommend abdominal ultrasound for further  evaluation.    Peripheral zone of increased attenuation in the uterine fundus with a central  zone of homogeneous relative decreased attenuation. Pelvic ultrasound is  recommended to further characterize.  PRE_RX Scan:       POST TX 6/28/17:      Impression: HCC responding to Rx  Possible LDT candidate   Gallstones,+/- symprtomatic  Plan:       Load CT scans into system ASAP  HIDA scan now.call results  Refer to Dr Bronson for  Y90 vs DEBTace  Case discussed with him.  Consider cholecystectomy if HIDA scan positive.  Moderate risk.      ADDENDUM:  HIDA scan positive,  Suspect cholecystitis/biliary colic.  Long d/w patietns  Recommend  :chemo holiday  Cardiology clearance  RTC 2 weeks to set up for cholecystectomy followed by Y 90 to liver  CEA, , AFP      MEMO Luther MD, FACS  Professor of Surgery, [facility]  Neuroendocrine Surgery, Hepatic/Pancreatic & General Surgery  [address], [address]  [place]  [address]  ph. [phone]; [phone]  fax. [phone]

## 2017-08-10 NOTE — ANESTHESIA POSTPROCEDURE EVALUATION
"Anesthesia Post Evaluation    Patient: Aurelia Massey    Procedure(s) Performed: Procedure(s) (LRB):  CHOLECYSTECTOMY-LAPAROSCOPIC (N/A)  BIOPSY-LIVER-LAPAROSCOPIC (N/A)    Final Anesthesia Type: general  Patient location during evaluation: PACU  Patient participation: Yes- Able to Participate  Level of consciousness: awake and alert  Post-procedure vital signs: reviewed and stable  Pain management: adequate  Airway patency: patent  PONV status at discharge: No PONV  Anesthetic complications: no      Cardiovascular status: blood pressure returned to baseline and hemodynamically stable  Respiratory status: unassisted  Hydration status: euvolemic  Follow-up not needed.        Visit Vitals  /65   Pulse 70   Temp 36.5 °C (97.7 °F)   Resp (!) 22   Ht 5' 3" (1.6 m)   Wt 46.3 kg (102 lb)   SpO2 100%   Breastfeeding? No   BMI 18.07 kg/m²       Pain/Kathya Score: Pain Assessment Performed: Yes (8/10/2017  3:00 PM)  Presence of Pain: complains of pain/discomfort (8/10/2017  3:00 PM)  Pain Rating Prior to Med Admin: 8 (8/10/2017  1:26 PM)  Kathya Score: 9 (8/10/2017 12:40 PM)      "

## 2017-08-10 NOTE — OP NOTE
[facility]  Endocrine Surgery  Operative Note    SUMMARY     Date of Procedure: 8/10/2017     Procedure: Laparoscopic cholecystectomy.Liver biopsy    Attending Surgeon: ANILA Luther M.D.     [place] Surgeon:Adryan Agudelo MD    :MADISON Kwon MD    Pre-Operative Diagnosis:   Cholecystits, chronic, undifferentiated hepatic cancer    Post-Operative Diagnosis:   same    Anesthesia: General    INDICATIONS: Aurelia Massey is a 71 y.o..female who presented with biliary colic    Procedure in detail: After the risks and benefits were explained   to the patient and they expressed wish to proceed, consent was obtained,   and the patient was wheeled to the operating theater. Following   appropriate endotracheal anesthesia being obtained, the patient was   positioned, the skin was prepped and draped in normal sterile fashion.   Berkowitz technique was used to enter the abdomen through the umbilicus.   An 11 mm trocar was placed into the abdomen and pneumoperitoneum   was created. Three 5 mm trocars were placed under direct visualization, 1   in the subxiphoid region and 2 in the patient's right upper quadrant. The   patient's gallbladder was noted to be extremely intrahepatic and liver was   large extending below the costal margin. [place] dissector was used to   dissect and isolate the cystic duct and artery. The critical view was successfully achieved. One Hemo-lock clip was placed proximally and two clips were placed distal to the gallbladder on the cystic duct. Two clips were placed on the cystic artery proximally and both structures were transected. Hook electrocautery was used to dissect the   gallbladder from the gallbladder fossa. Specimen bag was placed into the   abdomen and it was retrieved.    Because the primary tumor site is unknown a liver biopsy was done for [facility] Cancer Type ID. The 18G trucutbiopsy needle was advanced into the tumor in the dome of the liver traversing normal  liver tissue and fired, 2 passes and 2 cores were obtained. The biopsy site was cauterized and hemostasis secured.   Pneumoperitoneum was relieved after antibiotic irrigation. Then 0   Vicryl was used to close the fascia in a figure-of-eight fashion and 4-0   Monocryl was used to close all skin incisions. Then 0.25% Marcaine plain was placed into the port sites for postoperative analgesia. All sponge, instrument, and needle counts were correct at the termination of the lap chip procedure. The patient was successful extubated at transferred to the PACU in stable condition.The patient tolerated the procedure without complication.     Intraoperative Findings: Lap chip and needle liver biopsy performed without difficulty.         Complications: No    Estimated Blood Loss (EBL): * No values recorded between 8/10/2017 10:12 AM and 8/10/2017 11:12 AM *           Implants: * No implants in log *    Specimens:   Specimen (12h ago through future)    Start     Ordered    08/10/17 1102  Specimen to Pathology - Surgery  Once     Comments:  1. Gallbladder-perm2. Liver bx-send for bio cancer ID-perm      08/10/17 1104           Condition: Good    Disposition: PACU - hemodynamically stable.    Attestation: I was present and scrubbed for the entire procedure.

## 2017-08-10 NOTE — INTERVAL H&P NOTE
The patient has been examined and the H&P has been reviewed:    I concur with the findings and no changes have occurred since H&P was written.    Anesthesia/Surgery risks, benefits and alternative options discussed and understood by patient/family.    OR today for cholecystectomy  Consents obtained  NPO    Adryan Agudelo MD        Active Hospital Problems    Diagnosis  POA    Cholecystitis [K81.9]  Yes      Resolved Hospital Problems    Diagnosis Date Resolved POA   No resolved problems to display.

## 2017-08-10 NOTE — PLAN OF CARE
POC board updated and reviewed with pt and pt's family. Pt and pt's family verbalize understanding. Safety precautions maintained. Call bell in reach. Non skid socks on. Bed locked and in lowest position. Instructed pt to call for assistance. Pt verbalizes understanding.

## 2017-08-10 NOTE — PROGRESS NOTES
Patient arrived from PACU still sleepy but arousable. Patient on 2L NC SAO2 98%. VS Stable.No distress noted. SCDs placed and new IV tubing hung. Explained diet and pain medications to family member at bedside.

## 2017-08-10 NOTE — TRANSFER OF CARE
"Anesthesia Transfer of Care Note    Patient: Aurelia Massey    Procedure(s) Performed: Procedure(s) (LRB):  CHOLECYSTECTOMY-LAPAROSCOPIC (N/A)  BIOPSY-LIVER-LAPAROSCOPIC (N/A)    Patient location: PACU    Anesthesia Type: general    Transport from OR: Transported from OR on 6-10 L/min O2 by face mask with adequate spontaneous ventilation    Post pain: adequate analgesia    Post assessment: no apparent anesthetic complications and tolerated procedure well    Post vital signs: stable    Level of consciousness: awake    Nausea/Vomiting: no nausea/vomiting    Complications: none    Transfer of care protocol was followed      Last vitals:   Visit Vitals  BP (!) 119/57 (BP Location: Left arm, Patient Position: Lying)   Pulse 77   Temp 36.7 °C (98.1 °F) (Oral)   Resp 14   Ht 5' 3" (1.6 m)   Wt 46.3 kg (102 lb)   SpO2 99%   Breastfeeding? No   BMI 18.07 kg/m²     "

## 2017-08-10 NOTE — H&P (VIEW-ONLY)
"NOLANETS:  [facility]  A collaboration between [facility] and [facility]      PATIENT: Aurelia Massey  MRN: [id number]  DATE: 8/8/2017    Subjective:      Chief Complaint: Follow-up (to discuss surgery)      Vitals:   Vitals:    08/08/17 1238   BP: 118/66   BP Location: Left arm   Patient Position: Sitting   Pulse: 64   Temp: 97.7 °F (36.5 °C)   TempSrc: Oral   Weight: 46.6 kg (102 lb 12.8 oz)   Height: 5' 3" (1.6 m)        Karnofsky Score:     Diagnosis:   1. 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 Biliary colic    4. Calculus of gallbladder without cholecystitis without obstruction    5. RUQ pain    6. Chronic cholecystitis    7. History of ITP    8. Anemia due to antineoplastic chemotherapy         Oncologic History:  Multifocal HCC, completed 3 cycles chemoTx with response. referred for liver directed therapy        Interval History:  recently seen in ER for abdominal pain, low grade temp. W/u revealed cholelithiasis/biilary cholic and probable cholecystits, treated with antibiotics.  Still c/o RUQ pain.   Looks to be candidate fo rY 90 after cholecystectomy.  Cardiology clearance done:    Cardiac clearance:      Past Medical History:  Past Medical History:   Diagnosis Date    Acute ITP     Arthritis     Cancer     liver first round chemo 1/30/2017     Cholangiocarcinoma     Encounter for blood transfusion        Past Surgical History:  Past Surgical History:   Procedure Laterality Date    COLONOSCOPY      SPLENECTOMY, TOTAL         Family History:  Family History   Problem Relation Age of Onset    Rheum arthritis Sister     Heart disease Sister      MI    Cancer Mother      leukemia    Heart disease Mother     Cancer Father      brain and lung    Cancer Brother      lung    Coronary artery disease Maternal Grandmother     Cancer Maternal Grandfather      lung "       Allergies:  Review of patient's allergies indicates:   Allergen Reactions    Plaquenil [hydroxychloroquine] Palpitations       Medications:  Current Outpatient Prescriptions   Medication Sig Dispense Refill    calcium-vitamin D3 (CALCIUM 500 + D) 500 mg(1,250mg) -200 unit per tablet Take 2 tablets by mouth every other day.      hydrocodone-acetaminophen 5-325mg (NORCO) 5-325 mg per tablet Take 1 tablet by mouth every 6 (six) hours as needed for Pain.      melatonin 1 mg Tab Take by mouth.      multivitamin capsule Take 1 capsule by mouth once daily.      ondansetron (ZOFRAN) 8 MG tablet TAKE 1 TABLET BY MOUTH EVERY 8 HOURS AS NEEDED FOR NAUSEA AND VOMITING  2    ondansetron (ZOFRAN-ODT) 8 MG TbDL Take 4 mg by mouth every 6 (six) hours as needed (nausea).       No current facility-administered medications for this visit.        Review of Systems   Constitutional: Positive for appetite change. Negative for activity change, chills, fatigue, fever and unexpected weight change.   HENT: Negative.  Negative for congestion, ear pain, rhinorrhea and sinus pressure.    Eyes: Negative.  Negative for photophobia, pain and redness.   Respiratory: Negative.  Negative for cough, chest tightness, shortness of breath and wheezing.    Cardiovascular: Negative for chest pain, palpitations and leg swelling.   Gastrointestinal: Positive for abdominal pain (RUQ). Negative for abdominal distention, anal bleeding, blood in stool, constipation, diarrhea, nausea, rectal pain and vomiting.   Endocrine: Negative.  Negative for cold intolerance, heat intolerance, polydipsia, polyphagia and polyuria.   Genitourinary: Negative.  Negative for difficulty urinating, dysuria and frequency.   Musculoskeletal: Negative.  Negative for arthralgias, back pain, gait problem, myalgias, neck pain and neck stiffness.   Skin: Negative.  Negative for color change, pallor and rash.   Allergic/Immunologic: Negative.  Negative for environmental  allergies, food allergies and immunocompromised state.   Neurological: Negative.  Negative for dizziness, seizures, syncope, weakness and light-headedness.   Hematological: Negative.  Negative for adenopathy. Does not bruise/bleed easily.   Psychiatric/Behavioral: Negative.  Negative for agitation, behavioral problems, confusion, decreased concentration, dysphoric mood, hallucinations, self-injury, sleep disturbance and suicidal ideas. The patient is not nervous/anxious and is not hyperactive.       Objective:      Physical Exam   Constitutional: She is oriented to person, place, and time. She appears well-developed. No distress.   Thin     HENT:   Head: Normocephalic and atraumatic.   Mouth/Throat: Oropharynx is clear and moist.   Eyes: EOM are normal. Pupils are equal, round, and reactive to light. No scleral icterus.   Neck: Normal range of motion. Neck supple. No JVD present. No tracheal deviation present.   Cardiovascular: Normal rate and regular rhythm.  Exam reveals no gallop.    No murmur heard.  Pulmonary/Chest: Effort normal. No respiratory distress. She has no wheezes. She has no rales.   Abdominal: Soft. Bowel sounds are normal. She exhibits no distension and no mass. Tenderness: RUQ over liver. no hepatomegalymild Hicks sign. There is no rebound and no guarding. No hernia. Hernia confirmed negative in the ventral area.   Musculoskeletal: Normal range of motion. She exhibits no edema or tenderness.   Neurological: She is alert and oriented to person, place, and time. No cranial nerve deficit.   Skin: Skin is warm, dry and intact. No rash noted. She is not diaphoretic. No erythema. No pallor.   allopecia   Psychiatric: She has a normal mood and affect. Her behavior is normal. Thought content normal.   Nursing note and vitals reviewed.     Assessment:       1. 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 Biliary colic    4. Calculus of gallbladder without cholecystitis without  obstruction    5. RUQ pain    6. Chronic cholecystitis    7. History of ITP    8. Anemia due to antineoplastic chemotherapy        Laboratory Data:    Neuroendocrine Labs Latest Ref Rng & Units 8/8/2017 7/21/2017 7/18/2017 7/13/2017 6/29/2017 6/29/2017 6/26/2017   CA 19-9 0 - 35 U/mL - - 17 - - - -   WBC 3.90 - 12.70 K/uL - - - - 7.87 - -   HGB 12.0 - 15.0 g/dl - - 10.0(L) - 11.6(L) - 10.4(L)   HCT 36.0 - 48.0 % - - 28.4(L) - 33.2(L) - 31.9(L)   PLATLETS 140 - 440 K/ul - - 325 - 424(H) - 403   PT 9.0 - 12.5 sec - - - - 11.7 - -   PTT 21.0 - 32.0 sec - - - - - - -   INR 0.8 - 1.2 - - - - 1.1 - -   GLUCOSE 70 - 110 mg/dL - - - - - - -   BUN 8 - 20 mg/dL - - 9 - - - 14   CREATININE 0.5 - 1.4 mg/dL - - - - - - -    - 145 mmol/L - - - - - - -   K 3.5 - 5.0 mmol/L - - 3.8 - - - 4.3   CHLORIDE 95 - 110 mmol/L - - - - - - -   CO2 22.8 - 31.6 mmol/L - - 24.7 - - - 25.9   CALCIUM 7.7 - 10.4 mg/dL - - 8.7 - - - 8.7   PROTEIN, TOTAL 6.0 - 8.2 g/dL - - 6.2 - - - 6.2   ALBUMIN 3.5 - 5.2 g/dL - - - - - - -   TOTAL BILIRUBIN 0.3 - 1.0 mg/dL - - 0.5 - - - 0.3   DIRECT BILIRUBIN 0.1 - 0.3 mg/dL - - - - - - -   ALK PHOSPHATASE 40 - 104 IU/L - - 144(H) - - - 142(H)   SGOT (AST) 10 - 40 IU/L - - 29 - - - 32   SGPT (ALT) 10 - 44 U/L - - - - - - -   Weight - 102 lbs 13 oz 106 lbs - 106 lbs - 107 lbs -   Sedimentation Rate, Manual 0 - 20 mm/Hr - - - - - - -     Scans/images reviewed.  CT 5/1/17:  IMPRESSION: Focal heterogeneous lesion in the dome of the liver which is  overall decreased in size compared to the prior scan of 03/10/2027 although  there is an increasing enhancing focus centrally. Other hypoattenuating foci in  the liver are either stable are decreased in size.    Stable hypoattenuating area posterior and superior to the pancreas likely  representing peripancreatic lymphadenopathy.    Probable cholesterol gallstones. Recommend abdominal ultrasound for further  evaluation.    Peripheral zone of increased attenuation in  the uterine fundus with a central  zone of homogeneous relative decreased attenuation. Pelvic ultrasound is  recommended to further characterize.  PRE_RX Scan:       POST TX 6/28/17:      HIDA: HIDA scan positive,  Suspect cholecystitis/biliary colic.     Impression: HCC responding to Rx  Possible LDT candidate   Gallstones,+/- symprtomatic  Plan:            Long d/w patient  Recommend :chemo holiday  Cardiology clearance done  Lap chip  for cholecystectomy followed by Y 90 to liver after recovered.  Moderate risk.  Risks/benefits explained and accepted.  All questions answered.    MARY Luther MD, FACS  Professor of Surgery, [facility]  Neuroendocrine Surgery, Hepatic/Pancreatic & General Surgery  [address], [address]  [place]  [address]  ph. [phone]; [phone]  fax. [phone]

## 2017-08-11 VITALS
HEIGHT: 63 IN | SYSTOLIC BLOOD PRESSURE: 84 MMHG | OXYGEN SATURATION: 99 % | RESPIRATION RATE: 18 BRPM | WEIGHT: 102 LBS | HEART RATE: 63 BPM | TEMPERATURE: 98 F | DIASTOLIC BLOOD PRESSURE: 45 MMHG | BODY MASS INDEX: 18.07 KG/M2

## 2017-08-11 PROCEDURE — 25000003 PHARM REV CODE 250: Performed by: SURGERY

## 2017-08-11 PROCEDURE — 94761 N-INVAS EAR/PLS OXIMETRY MLT: CPT

## 2017-08-11 PROCEDURE — A4216 STERILE WATER/SALINE, 10 ML: HCPCS | Performed by: SURGERY

## 2017-08-11 RX ORDER — HYDROCODONE BITARTRATE AND ACETAMINOPHEN 5; 325 MG/1; MG/1
1 TABLET ORAL EVERY 6 HOURS PRN
Qty: 30 TABLET | Refills: 0 | Status: SHIPPED | OUTPATIENT
Start: 2017-08-11

## 2017-08-11 RX ADMIN — SODIUM CHLORIDE, PRESERVATIVE FREE 3 ML: 5 INJECTION INTRAVENOUS at 03:08

## 2017-08-11 NOTE — NURSING
Discharge instructions given to patient along with pain prescription. Patient verbalized understanding.IV discontinued to rt. Hand, gelco intact. Tolerated well.

## 2017-08-11 NOTE — PROGRESS NOTES
Progress Note    Admit Date: 8/10/2017   LOS: 0 days     SUBJECTIVE:     NAEO, s/p lap chip and liver Bx yesterday, doping well. [place] CLD, amb some, voiding well, afebrile, pain controlled.    Scheduled Meds:   enoxaparin  40 mg Subcutaneous Q24H    sodium chloride 0.9%  3 mL Intravenous Q8H     Continuous Infusions:   PRN Meds:acetaminophen, diphenhydrAMINE, hydrocodone-acetaminophen 5-325mg, ibuprofen, ondansetron, promethazine (PHENERGAN) IVPB, ramelteon, sodium chloride 0.9%    Review of patient's allergies indicates:   Allergen Reactions    Plaquenil [hydroxychloroquine] Palpitations         OBJECTIVE:     Vital Signs (Most Recent)  Temp: 98.1 °F (36.7 °C) (08/11/17 0535)  Pulse: 61 (08/11/17 0535)  Resp: 17 (08/11/17 0535)  BP: (!) 113/59 (08/11/17 0535)  SpO2: 100 % (08/11/17 0142)    Vital Signs Range (Last 24H):  Temp:  [97.3 °F (36.3 °C)-98.3 °F (36.8 °C)]   Pulse:  []   Resp:  [12-35]   BP: (113-156)/(59-91)   SpO2:  [97 %-100 %]     I & O (Last 24H):  Intake/Output Summary (Last 24 hours) at 08/11/17 0820  Last data filed at 08/11/17 0530   Gross per 24 hour   Intake              800 ml   Output             1250 ml   Net             -450 ml     Physical Exam:  Gen:NAD  Cv: RRR  Resp: No Inc WOB  Abd: soft, Nt, Nd, incisions. C/D/I    Laboratory:  CBC:   Recent Labs  Lab 08/10/17  0808   WBC 10.00   RBC 3.71*   HGB 12.0   HCT 34.0*   *   MCV 92   MCH 32.3*   MCHC 35.3     CMP:   Recent Labs  Lab 08/10/17  0808   GLU 56*  56*   CALCIUM 9.2  9.2   ALBUMIN 3.4*   PROT 7.3   *  130*   K 3.7  3.7   CO2 18*  18*   CL 99  99   BUN 10  10   CREATININE 0.8  0.8   ALKPHOS 165*   ALT 10   AST 28   BILITOT 0.8       ASSESSMENT/PLAN:     70yo F with chronic cholecystitis, liver cancer s/p lap chip and liver Bx    Encourage Amb and PO intake  Reg diet for lunch  Pain control  Likely DC today    Adryan Agudelo MD

## 2017-08-11 NOTE — DISCHARGE SUMMARY
[facility]  Discharge Summary      Admit Date: 8/10/2017    Discharge Date and Time: 8/11/17 1600    Attending Physician: MEMO Luther MD     Reason for Admission: chronic cholecystitis, cholecystectomy    Procedures Performed: Procedure(s) (LRB):  CHOLECYSTECTOMY-LAPAROSCOPIC (N/A)  BIOPSY-LIVER-LAPAROSCOPIC (N/A)    Hospital Course Had successful Lap chip and liver Bx. On POD1 italo diet, amb well, pain controlled, afebrile., Discharged home in stable condition    Consults: none    Significant Diagnostic Studies: none    Final Diagnoses:    Principal Problem: Cholecystitis   Secondary Diagnoses:   Active Hospital Problems    Diagnosis  POA    *Cholecystitis [K81.9]  Yes      Resolved Hospital Problems    Diagnosis Date Resolved POA   No resolved problems to display.       Discharged Condition: stable    Disposition: Home or Self Care    Follow Up/Patient Instructions:     Medications:  Reconciled Home Medications:   Discharge Medication List as of 8/11/2017  3:47 PM      CONTINUE these medications which have CHANGED    Details   hydrocodone-acetaminophen 5-325mg (NORCO) 5-325 mg per tablet Take 1 tablet by mouth every 6 (six) hours as needed for Pain., Starting Fri 8/11/2017, Print         CONTINUE these medications which have NOT CHANGED    Details   calcium-vitamin D3 (CALCIUM 500 + D) 500 mg(1,250mg) -200 unit per tablet Take 2 tablets by mouth every other day., Until Discontinued, Historical Med      multivitamin capsule Take 1 capsule by mouth once daily., Until Discontinued, Historical Med      ondansetron (ZOFRAN) 8 MG tablet TAKE 1 TABLET BY MOUTH EVERY 8 HOURS AS NEEDED FOR NAUSEA AND VOMITING, Historical Med      ondansetron (ZOFRAN-ODT) 8 MG TbDL Take 4 mg by mouth every 6 (six) hours as needed (nausea)., Until Discontinued, Historical Med             Discharge Procedure Orders  Diet general     Activity as tolerated     Call MD for:  temperature >100.4     Call MD for:   persistent nausea and vomiting or diarrhea     Call MD for:  severe uncontrolled pain     Call MD for:  redness, tenderness, or signs of infection (pain, swelling, redness, odor or green/yellow discharge around incision site)     Call MD for:  difficulty breathing or increased cough     Call MD for:  severe persistent headache     Call MD for:  worsening rash     Call MD for:  persistent dizziness, light-headedness, or visual disturbances     Call MD for:  increased confusion or weakness     No dressing needed       Follow-up Information     J Ranjit Luther MD In 2 weeks.    Specialty:  General Surgery  Contact information:  [address]  [address]  [place] [address] [phone]

## 2017-08-14 ENCOUNTER — TELEPHONE (OUTPATIENT)
Dept: NEUROLOGY | Facility: HOSPITAL | Age: 72
End: 2017-08-14

## 2017-08-14 NOTE — TELEPHONE ENCOUNTER
----- Message from Evelyn Covarrubias sent at 8/14/2017 10:01 AM CDT -----  Contact:   JPB-  called for a 2 week followup. Patient will have to speak to nurse to give a sooner appointmen. Call back number [phone]

## 2017-08-29 ENCOUNTER — OFFICE VISIT (OUTPATIENT)
Dept: NEUROLOGY | Facility: HOSPITAL | Age: 72
End: 2017-08-29
Attending: SURGERY
Payer: MEDICARE

## 2017-08-29 VITALS
DIASTOLIC BLOOD PRESSURE: 59 MMHG | HEIGHT: 63 IN | BODY MASS INDEX: 18.07 KG/M2 | SYSTOLIC BLOOD PRESSURE: 101 MMHG | WEIGHT: 102 LBS | HEART RATE: 65 BPM | TEMPERATURE: 97 F

## 2017-08-29 DIAGNOSIS — C22.0: ICD-10-CM

## 2017-08-29 DIAGNOSIS — C78.7 CHOLANGIOCARCINOMA METASTATIC TO LIVER: Primary | ICD-10-CM

## 2017-08-29 DIAGNOSIS — C22.1 CHOLANGIOCARCINOMA METASTATIC TO LIVER: Primary | ICD-10-CM

## 2017-08-29 PROCEDURE — 99213 OFFICE O/P EST LOW 20 MIN: CPT | Performed by: SURGERY

## 2017-08-29 NOTE — PROGRESS NOTES
S/p lap chip and liver biopsy.    Feels fine  Good appetite afebriole  Incisions healing well.    Biopsy of tumor:  fibrosis and eosinophils    ( incr)    OK to proceed with Y90  Embolization  F/U with Dr Samaniego

## 2017-08-29 NOTE — PATIENT INSTRUCTIONS
Someone will contact you to schedule Y90 with Interventional Radiology.     Follow up with Dr. Whitehead

## 2017-08-30 ENCOUNTER — TELEPHONE (OUTPATIENT)
Dept: HEMATOLOGY/ONCOLOGY | Facility: CLINIC | Age: 72
End: 2017-08-30

## 2017-08-31 ENCOUNTER — TELEPHONE (OUTPATIENT)
Dept: NEUROLOGY | Facility: HOSPITAL | Age: 72
End: 2017-08-31

## 2017-08-31 NOTE — TELEPHONE ENCOUNTER
----- Message from Silvia Paredes sent at 8/30/2017 10:40 AM CDT -----  Patient is schedule on 9-7-17 for a clinic consult with Dr. Bronson to discuss the Y-90 treatment.  ----- Message -----  From: Maegan Martini LPN  Sent: 8/29/2017   2:45 PM  To: Ryan Bronson MD, [place] Scheduling    This pt followed up in clinic with Dr. Luther today and per Dr. Luther, she is ready to proceed with y90.. Please advise and/or contact to schedule    Thanks!  Maegan

## 2017-09-01 ENCOUNTER — TELEPHONE (OUTPATIENT)
Dept: NEUROLOGY | Facility: HOSPITAL | Age: 72
End: 2017-09-01

## 2017-09-01 NOTE — TELEPHONE ENCOUNTER
----- Message from Evelyn Covarrubias sent at 8/31/2017  4:09 PM CDT -----  Contact: Dr Charley BURGOS- Dr. Whitehead was looking for the final results biopsy. Call back number [phone]. Any nurse can take call

## 2017-09-04 ENCOUNTER — TELEPHONE (OUTPATIENT)
Dept: HEMATOLOGY/ONCOLOGY | Facility: CLINIC | Age: 72
End: 2017-09-04

## 2017-09-04 NOTE — TELEPHONE ENCOUNTER
----- Message from Brenda Gil sent at 8/30/2017  9:52 AM CDT -----  Contact: Tonie-    Patient's  called in and stated that Dr. Luther removed Aurelia's gallbladder and Dr. Luther would like for her to have a follow up with you. Tonie () is requesting a [place] appointment. Please schedule. Thanks

## 2017-09-07 ENCOUNTER — INITIAL CONSULT (OUTPATIENT)
Dept: INTERVENTIONAL RADIOLOGY/VASCULAR | Facility: CLINIC | Age: 72
End: 2017-09-07
Payer: MEDICARE

## 2017-09-07 VITALS
WEIGHT: 101.38 LBS | BODY MASS INDEX: 17.96 KG/M2 | SYSTOLIC BLOOD PRESSURE: 94 MMHG | HEART RATE: 66 BPM | DIASTOLIC BLOOD PRESSURE: 63 MMHG

## 2017-09-07 DIAGNOSIS — C22.1 CHOLANGIOCARCINOMA METASTATIC TO LIVER: Primary | ICD-10-CM

## 2017-09-07 DIAGNOSIS — C22.0: ICD-10-CM

## 2017-09-07 DIAGNOSIS — C78.7 CHOLANGIOCARCINOMA METASTATIC TO LIVER: Primary | ICD-10-CM

## 2017-09-07 PROCEDURE — 3008F BODY MASS INDEX DOCD: CPT | Mod: S$GLB,,, | Performed by: RADIOLOGY

## 2017-09-07 PROCEDURE — 99999 PR PBB SHADOW E&M-EST. PATIENT-LVL III: CPT | Mod: PBBFAC,,, | Performed by: RADIOLOGY

## 2017-09-07 PROCEDURE — 99203 OFFICE O/P NEW LOW 30 MIN: CPT | Mod: S$GLB,,, | Performed by: RADIOLOGY

## 2017-09-07 PROCEDURE — 1159F MED LIST DOCD IN RCRD: CPT | Mod: S$GLB,,, | Performed by: RADIOLOGY

## 2017-09-07 PROCEDURE — 1126F AMNT PAIN NOTED NONE PRSNT: CPT | Mod: S$GLB,,, | Performed by: RADIOLOGY

## 2017-09-07 NOTE — PROGRESS NOTES
Consult Note  Interventional Radiology    Consult Requested By: Dr. ANILA Luther    Reason for Consult: Metastatic cancer to liver    Consults    SUBJECTIVE:     Chief Complaint: Liver mass    History of Present Illness: 72-year-old female with metastatic cancer of unknown primary, likely hepatocellular or pancreatic or biliary cancer presents for consultation for liver directed therapy for large right hepatic liver lobe mass.  Patient reports initial weight loss of 20 pounds.  However, her weight has been stable at approximately 100 pounds lately.  She reports weakness which it she relates to her malignancy and no interval worsening of her functional status.  She needs no assistance with activities of daily living.  She denies any pain currently.  Patient has been off chemotherapy for approximately 6 weeks.  She recently underwent cholecystectomy by Dr. Luther and she's she'll do well from the surgery. Patient presents to discuss therapeutic options.    Past Medical History:   Diagnosis Date    Arthritis     Cholangiocarcinoma 12/2016    liver first round chemo 1/30/2017     Encounter for blood transfusion     History of ITP     1980's    Rheumatoid arthritis involving both hands      Past Surgical History:   Procedure Laterality Date    COLONOSCOPY      SPLENECTOMY, TOTAL  1980's     Family History   Problem Relation Age of Onset    Rheum arthritis Sister     Heart disease Sister      MI    Cancer Mother      leukemia    Heart disease Mother     Cancer Father      brain and lung    Cancer Brother      lung    Coronary artery disease Maternal Grandmother     Cancer Maternal Grandfather      lung     Social History   Substance Use Topics    Smoking status: Never Smoker    Smokeless tobacco: Never Used    Alcohol use No       Review of patient's allergies indicates:   Allergen Reactions    Plaquenil [hydroxychloroquine] Palpitations        Review of Systems:  Constitutional/General:No fever,  chills, change in appetite or weight loss.  Eyes: negative   ENT: no sore throat, ear pain, or symptoms suggestive of sinusitis  Hematological/Immuno: no known coagulopathies  Respiratory: no shortness of breath  Cardiovascular: no chest pain  Gastrointestinal: no abdominal pain  Genito-Urinary: no dysuria  Musculoskeletal: negative  Skin: Negative for rash, itching, pigmentation changes, nail or hair changes.  Neurological: no TIA or stroke symptoms  Psychiatric: normal mood/affect, good insight/judgement  Endo:no signs suggestive of diabetes, thyroid disease, or other endocrine disorders    OBJECTIVE:     BP 94/63 (BP Location: Right arm, Patient Position: Sitting)   Pulse 66   Wt 46 kg (101 lb 6.4 oz)   BMI 17.96 kg/m²       Physical Exam:  General- Patient alert and oriented x3 in NAD  Eyes-  ENT- EOMI  Neck- No JVD  CV- Regular rate and rhythm  Resp-  No increased WOB  GI- Non tender/non-distended  Extrem- No cyanosis, clubbing.   Derm- No rashes, masses, or lesions noted  Neuro-  No focal deficits noted.     Physical Exam  Body mass index is 17.96 kg/m².    Laboratory:    Lab Results   Component Value Date    INR 1.1 06/29/2017       Lab Results   Component Value Date    WBC 10.00 08/10/2017    HGB 12.0 08/10/2017    HCT 34.0 (L) 08/10/2017    MCV 92 08/10/2017     (H) 08/10/2017      Lab Results   Component Value Date    GLU 56 (L) 08/10/2017    GLU 56 (L) 08/10/2017     (L) 08/10/2017     (L) 08/10/2017    K 3.7 08/10/2017    K 3.7 08/10/2017    CL 99 08/10/2017    CL 99 08/10/2017    CO2 18 (L) 08/10/2017    CO2 18 (L) 08/10/2017    BUN 10 08/10/2017    BUN 10 08/10/2017    CREATININE 0.8 08/10/2017    CREATININE 0.8 08/10/2017    CALCIUM 9.2 08/10/2017    CALCIUM 9.2 08/10/2017    ALT 10 08/10/2017    AST 28 08/10/2017    ALBUMIN 3.4 (L) 08/10/2017    BILITOT 0.8 08/10/2017    BILIDIR 0.4 (H) 02/01/2017         Diagnostic Results:  CT: Reviewed    EKG: N/A    ASSESSMENT/PLAN:      72-year-old female with metastatic adenocarcinoma of unknown primary to the liver.  -  Reveal most recent CT scan demonstrates a predominantly hypodense liver lesion with adjacent satellite nodules occupying the majority of segment 8 and possibly extending to segment 5.  Pulmonary nodules are also identified.  Possible extrahepatic disease in the form of peripancreatic adenopathy is also suspected.  Discussed with patient different forms of liver the rectum therapy.  Given the size of the lesion, ablation would not be ideal at this time.  Radioembolization, either via radial or femoral approach were discussed.  Discussed risks, benefits and alternatives as well as therefore additional treatment depending on overall response.  The patient would like to proceed as soon as possible and with arrange for mapping procedure next week.  We'll continue to monitor bilirubin levels closely given that the most recent prior study, there is upward trend.  Given disease distribution, will attempt segmentectomy rather than lobar treatment, in attempt to preserve as much normal parenchyma as possible.  Discussed with patient postprocedure instructions as well as followup.    Thank you for referral,    Ryan Bronson M.D.  Diagnostic and Interventional Radiologist  Department of Radiology  Pager: [phone]

## 2017-09-20 ENCOUNTER — TELEPHONE (OUTPATIENT)
Dept: INTERVENTIONAL RADIOLOGY/VASCULAR | Facility: HOSPITAL | Age: 72
End: 2017-09-20

## 2017-09-22 ENCOUNTER — HOSPITAL ENCOUNTER (OUTPATIENT)
Dept: RADIOLOGY | Facility: HOSPITAL | Age: 72
Discharge: HOME | End: 2017-09-22
Attending: RADIOLOGY
Payer: MEDICARE

## 2017-09-22 ENCOUNTER — HOSPITAL ENCOUNTER (OUTPATIENT)
Dept: INTERVENTIONAL RADIOLOGY/VASCULAR | Facility: HOSPITAL | Age: 72
Discharge: HOME | End: 2017-09-22
Attending: RADIOLOGY
Payer: MEDICARE

## 2017-09-22 ENCOUNTER — HOSPITAL ENCOUNTER (OUTPATIENT)
Dept: RADIOLOGY | Facility: HOSPITAL | Age: 72
Discharge: HOME | End: 2017-09-22
Attending: RADIOLOGY | Admitting: RADIOLOGY
Payer: MEDICARE

## 2017-09-22 VITALS
BODY MASS INDEX: 19.26 KG/M2 | HEIGHT: 61 IN | SYSTOLIC BLOOD PRESSURE: 115 MMHG | HEART RATE: 70 BPM | WEIGHT: 102 LBS | DIASTOLIC BLOOD PRESSURE: 65 MMHG | RESPIRATION RATE: 14 BRPM | OXYGEN SATURATION: 95 % | TEMPERATURE: 98 F

## 2017-09-22 DIAGNOSIS — C78.7 METASTATIC CANCER TO LIVER: ICD-10-CM

## 2017-09-22 LAB
ALBUMIN SERPL BCP-MCNC: 3.1 G/DL
ALP SERPL-CCNC: 147 U/L
ALT SERPL W/O P-5'-P-CCNC: 11 U/L
ANION GAP SERPL CALC-SCNC: 7 MMOL/L
APTT BLDCRRT: 27.2 SEC
AST SERPL-CCNC: 28 U/L
BASOPHILS # BLD AUTO: 0.06 K/UL
BASOPHILS NFR BLD: 0.9 %
BILIRUB SERPL-MCNC: 0.3 MG/DL
BUN SERPL-MCNC: 10 MG/DL
CALCIUM SERPL-MCNC: 9.1 MG/DL
CHLORIDE SERPL-SCNC: 105 MMOL/L
CO2 SERPL-SCNC: 22 MMOL/L
CREAT SERPL-MCNC: 0.7 MG/DL
DIFFERENTIAL METHOD: ABNORMAL
EOSINOPHIL # BLD AUTO: 0.1 K/UL
EOSINOPHIL NFR BLD: 1.4 %
ERYTHROCYTE [DISTWIDTH] IN BLOOD BY AUTOMATED COUNT: 13.6 %
EST. GFR  (AFRICAN AMERICAN): >60 ML/MIN/1.73 M^2
EST. GFR  (NON AFRICAN AMERICAN): >60 ML/MIN/1.73 M^2
GLUCOSE SERPL-MCNC: 82 MG/DL
HCT VFR BLD AUTO: 33 %
HGB BLD-MCNC: 11 G/DL
INR PPP: 1
LYMPHOCYTES # BLD AUTO: 3.3 K/UL
LYMPHOCYTES NFR BLD: 46.6 %
MCH RBC QN AUTO: 31.2 PG
MCHC RBC AUTO-ENTMCNC: 33.3 G/DL
MCV RBC AUTO: 94 FL
MONOCYTES # BLD AUTO: 0.5 K/UL
MONOCYTES NFR BLD: 7.7 %
NEUTROPHILS # BLD AUTO: 3 K/UL
NEUTROPHILS NFR BLD: 43.3 %
PLATELET # BLD AUTO: 343 K/UL
PMV BLD AUTO: 9.5 FL
POTASSIUM SERPL-SCNC: 4.7 MMOL/L
PROT SERPL-MCNC: 6.5 G/DL
PROTHROMBIN TIME: 10.7 SEC
RBC # BLD AUTO: 3.53 M/UL
SODIUM SERPL-SCNC: 134 MMOL/L
WBC # BLD AUTO: 6.97 K/UL

## 2017-09-22 PROCEDURE — 25000003 PHARM REV CODE 250

## 2017-09-22 PROCEDURE — 36245 INS CATH ABD/L-EXT ART 1ST: CPT | Mod: 59,,, | Performed by: RADIOLOGY

## 2017-09-22 PROCEDURE — 36247 INS CATH ABD/L-EXT ART 3RD: CPT | Mod: 51,LT,, | Performed by: RADIOLOGY

## 2017-09-22 PROCEDURE — 85730 THROMBOPLASTIN TIME PARTIAL: CPT

## 2017-09-22 PROCEDURE — 36248 INS CATH ABD/L-EXT ART ADDL: CPT | Mod: RT,,, | Performed by: RADIOLOGY

## 2017-09-22 PROCEDURE — 27201090 IR EMBOLIZATION COMP FOR TUMOR_ORGAN ISCHEMIA_INFARC

## 2017-09-22 PROCEDURE — 75774 ARTERY X-RAY EACH VESSEL: CPT | Mod: 26,59,, | Performed by: RADIOLOGY

## 2017-09-22 PROCEDURE — 85610 PROTHROMBIN TIME: CPT

## 2017-09-22 PROCEDURE — 79445 NUCLEAR RX INTRA-ARTERIAL: CPT | Mod: 26,,, | Performed by: RADIOLOGY

## 2017-09-22 PROCEDURE — 75774 ARTERY X-RAY EACH VESSEL: CPT | Mod: TC

## 2017-09-22 PROCEDURE — 25000003 PHARM REV CODE 250: Performed by: RADIOLOGY

## 2017-09-22 PROCEDURE — 75726 ARTERY X-RAYS ABDOMEN: CPT | Mod: TC

## 2017-09-22 PROCEDURE — 36415 COLL VENOUS BLD VENIPUNCTURE: CPT

## 2017-09-22 PROCEDURE — 85025 COMPLETE CBC W/AUTO DIFF WBC: CPT

## 2017-09-22 PROCEDURE — 75726 ARTERY X-RAYS ABDOMEN: CPT | Mod: 26,59,, | Performed by: RADIOLOGY

## 2017-09-22 PROCEDURE — 79445 NUCLEAR RX INTRA-ARTERIAL: CPT | Mod: TC

## 2017-09-22 PROCEDURE — 63600175 PHARM REV CODE 636 W HCPCS: Performed by: RADIOLOGY

## 2017-09-22 PROCEDURE — 78205 NM LIVER IMAGING SPECT: CPT | Mod: TC

## 2017-09-22 PROCEDURE — 78205 NM LIVER IMAGING SPECT: CPT | Mod: 26,,, | Performed by: RADIOLOGY

## 2017-09-22 PROCEDURE — 80053 COMPREHEN METABOLIC PANEL: CPT

## 2017-09-22 PROCEDURE — 78201 LIVER IMAGING STATIC ONLY: CPT | Mod: TC

## 2017-09-22 PROCEDURE — 99152 MOD SED SAME PHYS/QHP 5/>YRS: CPT | Mod: ,,, | Performed by: RADIOLOGY

## 2017-09-22 PROCEDURE — 36248 INS CATH ABD/L-EXT ART ADDL: CPT

## 2017-09-22 PROCEDURE — 99152 MOD SED SAME PHYS/QHP 5/>YRS: CPT

## 2017-09-22 PROCEDURE — 99153 MOD SED SAME PHYS/QHP EA: CPT

## 2017-09-22 PROCEDURE — 37243 VASC EMBOLIZE/OCCLUDE ORGAN: CPT | Mod: RT,,, | Performed by: RADIOLOGY

## 2017-09-22 PROCEDURE — 37243 VASC EMBOLIZE/OCCLUDE ORGAN: CPT

## 2017-09-22 RX ORDER — NITROGLYCERIN 5 MG/ML
INJECTION, SOLUTION INTRAVENOUS CODE/TRAUMA/SEDATION MEDICATION
Status: COMPLETED | OUTPATIENT
Start: 2017-09-22 | End: 2017-09-22

## 2017-09-22 RX ORDER — FENTANYL CITRATE 50 UG/ML
INJECTION, SOLUTION INTRAMUSCULAR; INTRAVENOUS CODE/TRAUMA/SEDATION MEDICATION
Status: COMPLETED | OUTPATIENT
Start: 2017-09-22 | End: 2017-09-22

## 2017-09-22 RX ORDER — SODIUM CHLORIDE 9 MG/ML
INJECTION, SOLUTION INTRAVENOUS CONTINUOUS
Status: DISCONTINUED | OUTPATIENT
Start: 2017-09-22 | End: 2017-09-23 | Stop reason: HOSPADM

## 2017-09-22 RX ORDER — MIDAZOLAM HYDROCHLORIDE 1 MG/ML
INJECTION, SOLUTION INTRAMUSCULAR; INTRAVENOUS CODE/TRAUMA/SEDATION MEDICATION
Status: COMPLETED | OUTPATIENT
Start: 2017-09-22 | End: 2017-09-22

## 2017-09-22 RX ORDER — PROMETHAZINE HYDROCHLORIDE 25 MG/ML
INJECTION, SOLUTION INTRAMUSCULAR; INTRAVENOUS CODE/TRAUMA/SEDATION MEDICATION
Status: COMPLETED | OUTPATIENT
Start: 2017-09-22 | End: 2017-09-22

## 2017-09-22 RX ORDER — HYDROCODONE BITARTRATE AND ACETAMINOPHEN 5; 325 MG/1; MG/1
1 TABLET ORAL EVERY 4 HOURS PRN
Status: CANCELLED | OUTPATIENT
Start: 2017-09-22

## 2017-09-22 RX ADMIN — NITROGLYCERIN 200 MCG: 5 INJECTION, SOLUTION INTRAVENOUS at 10:09

## 2017-09-22 RX ADMIN — MIDAZOLAM 2 MG: 1 INJECTION INTRAMUSCULAR; INTRAVENOUS at 09:09

## 2017-09-22 RX ADMIN — FENTANYL CITRATE 50 MCG: 50 INJECTION, SOLUTION INTRAMUSCULAR; INTRAVENOUS at 09:09

## 2017-09-22 RX ADMIN — PROMETHAZINE HYDROCHLORIDE 6.25 MG: 25 INJECTION INTRAMUSCULAR; INTRAVENOUS at 10:09

## 2017-09-22 RX ADMIN — MIDAZOLAM 1 MG: 1 INJECTION INTRAMUSCULAR; INTRAVENOUS at 09:09

## 2017-09-22 NOTE — DISCHARGE INSTRUCTIONS
Discharge Instructions for Hepatic Angiography  You had a procedure called hepatic angiography. This is an X-ray study of the blood vessels that supply your liver. During the procedure, a catheter (thin, flexible tube) was inserted into one of your blood vessels through a small incision. A specialty trained doctor called an interventional radiologist usually does the procedure. Heres what to do at home afterward.  Home care  · Follow your doctor's recommendations on when it is safe to drive after the procedure.  · Rest according to your doctor's instructions after the procedure. Most people are able to resume normal activity within a few days.  · Dont lift anything heavier than 10 pounds for 3 to 4 days.  · Avoid strenuous activity for 2 weeks after the procedure.  · Exercise according to your doctors recommendations.  · You can shower the day after the procedure.  · Ask your doctor when it is safe to swim or take a bath.  · Take your medications exactly as directed. Dont skip doses.  · Unless directed otherwise, drink 6 to 8 glasses of water a day to prevent dehydration and to help flush your body of the dye that was used during your procedure.  · Take your temperature and check the place where your incision was made for signs of infection (redness, swelling, or warmth) every day for a week.  Follow-up care  · Make a follow-up appointment as directed by our staff.  · If you have stitches or staples, see your doctor to have them removed 7 to 10 days after your procedure.  · Ask your doctor when you can return to work.  When to call your doctor  Call your doctor right away if you have any of the following:  · Constant or increasing pain or numbness in your leg  · Fever above 100.4°F (38.0°C)  · Signs of infection at the place where the incision was made (redness, swelling, or warmth)  · Shortness of breath  · A leg that feels cold or looks blue  · Bleeding, bruising, or a large swelling where the catheter was  inserted  · Blood in your urine  · Black or tarry stools  · Any unusual bleeding     Your Y-90 dose will be delivered to the hospital within 2-3 weeks. You will be contacted, within 2-3 weeks to be scheduled for the Y-90 delivery. The Y-90 delivery will be very similar to your procedure today. You are to be have nothing to eat or drink after midnight the night before your Y-90 delivery. Post operatively, you will, again, have to lie flat for 2 hours.

## 2017-09-22 NOTE — DISCHARGE SUMMARY
Radiology Discharge Summary      Hospital Course: No complications    Admit Date: 9/22/2017  Discharge Date: 09/22/2017     Instructions Given to Patient: Yes  Diet: Resume prior diet  Activity: activity as tolerated and no driving for today    Description of Condition on Discharge: Stable  Vital Signs (Most Recent): Temp: 97.8 °F (36.6 °C) (09/22/17 0738)  Pulse: 87 (09/22/17 1050)  Resp: 16 (09/22/17 1050)  BP: (!) 144/78 (09/22/17 1050)  SpO2: 100 % (09/22/17 1050)    Discharge Disposition: Home    Discharge Diagnosis: metastatic cancer to liver. F/U as scheduled. Take nexium or prilosec over the counter x 2 months.    Ryan Bronson M.D.  Diagnostic and Interventional Radiologist  Department of Radiology  Pager: [phone]

## 2017-09-22 NOTE — INTERVAL H&P NOTE
The patient has been examined and the H&P has been reviewed:    I concur with the findings and no changes have occurred since H&P was written.     Labs reviewed.    Ryan Bronson M.D.  Diagnostic and Interventional Radiologist  Department of Radiology  Pager: [phone]          Active Hospital Problems    Diagnosis  POA    Metastatic cancer to liver [C78.7]  Yes      Resolved Hospital Problems    Diagnosis Date Resolved POA   No resolved problems to display.

## 2017-09-22 NOTE — PROCEDURES
Radiology Post-Procedure Note    Pre Op Diagnosis: metastatic adenocarcinoma    Post Op Diagnosis: Same    Procedure: Y90 mapping    Procedure performed by: Ryan Bronson MD    Written Informed Consent Obtained: Yes  Specimen Removed: NO  Estimated Blood Loss: less than 50     Findings:   Via rt cfa, angiograms of sma, celiac, BHAVESH, LHA, MHA, supraduodenal artery, RHA and two RHA branches were obtained. 5 mCi of Tc99 MAA were administered via RHA branch supplying tumor. MHA coiled for consolidation purposes and supraduodenal from RHA coiled for protection. No complications. Exoseal to rt CFA.    Patient tolerated procedure well.    Ryan Bronson M.D.  Diagnostic and Interventional Radiologist  Department of Radiology  Pager: [phone]

## 2017-09-22 NOTE — H&P (VIEW-ONLY)
Consult Note  Interventional Radiology    Consult Requested By: Dr. ANILA Luther    Reason for Consult: Metastatic cancer to liver    Consults    SUBJECTIVE:     Chief Complaint: Liver mass    History of Present Illness: 72-year-old female with metastatic cancer of unknown primary, likely hepatocellular or pancreatic or biliary cancer presents for consultation for liver directed therapy for large right hepatic liver lobe mass.  Patient reports initial weight loss of 20 pounds.  However, her weight has been stable at approximately 100 pounds lately.  She reports weakness which it she relates to her malignancy and no interval worsening of her functional status.  She needs no assistance with activities of daily living.  She denies any pain currently.  Patient has been off chemotherapy for approximately 6 weeks.  She recently underwent cholecystectomy by Dr. Luther and she's she'll do well from the surgery. Patient presents to discuss therapeutic options.    Past Medical History:   Diagnosis Date    Arthritis     Cholangiocarcinoma 12/2016    liver first round chemo 1/30/2017     Encounter for blood transfusion     History of ITP     1980's    Rheumatoid arthritis involving both hands      Past Surgical History:   Procedure Laterality Date    COLONOSCOPY      SPLENECTOMY, TOTAL  1980's     Family History   Problem Relation Age of Onset    Rheum arthritis Sister     Heart disease Sister      MI    Cancer Mother      leukemia    Heart disease Mother     Cancer Father      brain and lung    Cancer Brother      lung    Coronary artery disease Maternal Grandmother     Cancer Maternal Grandfather      lung     Social History   Substance Use Topics    Smoking status: Never Smoker    Smokeless tobacco: Never Used    Alcohol use No       Review of patient's allergies indicates:   Allergen Reactions    Plaquenil [hydroxychloroquine] Palpitations        Review of Systems:  Constitutional/General:No fever,  chills, change in appetite or weight loss.  Eyes: negative   ENT: no sore throat, ear pain, or symptoms suggestive of sinusitis  Hematological/Immuno: no known coagulopathies  Respiratory: no shortness of breath  Cardiovascular: no chest pain  Gastrointestinal: no abdominal pain  Genito-Urinary: no dysuria  Musculoskeletal: negative  Skin: Negative for rash, itching, pigmentation changes, nail or hair changes.  Neurological: no TIA or stroke symptoms  Psychiatric: normal mood/affect, good insight/judgement  Endo:no signs suggestive of diabetes, thyroid disease, or other endocrine disorders    OBJECTIVE:     BP 94/63 (BP Location: Right arm, Patient Position: Sitting)   Pulse 66   Wt 46 kg (101 lb 6.4 oz)   BMI 17.96 kg/m²       Physical Exam:  General- Patient alert and oriented x3 in NAD  Eyes-  ENT- EOMI  Neck- No JVD  CV- Regular rate and rhythm  Resp-  No increased WOB  GI- Non tender/non-distended  Extrem- No cyanosis, clubbing.   Derm- No rashes, masses, or lesions noted  Neuro-  No focal deficits noted.     Physical Exam  Body mass index is 17.96 kg/m².    Laboratory:    Lab Results   Component Value Date    INR 1.1 06/29/2017       Lab Results   Component Value Date    WBC 10.00 08/10/2017    HGB 12.0 08/10/2017    HCT 34.0 (L) 08/10/2017    MCV 92 08/10/2017     (H) 08/10/2017      Lab Results   Component Value Date    GLU 56 (L) 08/10/2017    GLU 56 (L) 08/10/2017     (L) 08/10/2017     (L) 08/10/2017    K 3.7 08/10/2017    K 3.7 08/10/2017    CL 99 08/10/2017    CL 99 08/10/2017    CO2 18 (L) 08/10/2017    CO2 18 (L) 08/10/2017    BUN 10 08/10/2017    BUN 10 08/10/2017    CREATININE 0.8 08/10/2017    CREATININE 0.8 08/10/2017    CALCIUM 9.2 08/10/2017    CALCIUM 9.2 08/10/2017    ALT 10 08/10/2017    AST 28 08/10/2017    ALBUMIN 3.4 (L) 08/10/2017    BILITOT 0.8 08/10/2017    BILIDIR 0.4 (H) 02/01/2017         Diagnostic Results:  CT: Reviewed    EKG: N/A    ASSESSMENT/PLAN:      72-year-old female with metastatic adenocarcinoma of unknown primary to the liver.  -  Reveal most recent CT scan demonstrates a predominantly hypodense liver lesion with adjacent satellite nodules occupying the majority of segment 8 and possibly extending to segment 5.  Pulmonary nodules are also identified.  Possible extrahepatic disease in the form of peripancreatic adenopathy is also suspected.  Discussed with patient different forms of liver the rectum therapy.  Given the size of the lesion, ablation would not be ideal at this time.  Radioembolization, either via radial or femoral approach were discussed.  Discussed risks, benefits and alternatives as well as therefore additional treatment depending on overall response.  The patient would like to proceed as soon as possible and with arrange for mapping procedure next week.  We'll continue to monitor bilirubin levels closely given that the most recent prior study, there is upward trend.  Given disease distribution, will attempt segmentectomy rather than lobar treatment, in attempt to preserve as much normal parenchyma as possible.  Discussed with patient postprocedure instructions as well as followup.    Thank you for referral,    Ryan Bronson M.D.  Diagnostic and Interventional Radiologist  Department of Radiology  Pager: [phone]

## 2017-09-27 ENCOUNTER — OFFICE VISIT (OUTPATIENT)
Dept: HEMATOLOGY/ONCOLOGY | Facility: CLINIC | Age: 72
End: 2017-09-27
Payer: MEDICARE

## 2017-09-27 VITALS
WEIGHT: 100 LBS | HEART RATE: 71 BPM | RESPIRATION RATE: 20 BRPM | SYSTOLIC BLOOD PRESSURE: 114 MMHG | DIASTOLIC BLOOD PRESSURE: 70 MMHG | TEMPERATURE: 98 F | BODY MASS INDEX: 18.89 KG/M2

## 2017-09-27 DIAGNOSIS — C78.7 METASTATIC CANCER TO LIVER: Primary | ICD-10-CM

## 2017-09-27 DIAGNOSIS — C22.1 CHOLANGIOCARCINOMA METASTATIC TO LIVER: ICD-10-CM

## 2017-09-27 DIAGNOSIS — C78.7 CHOLANGIOCARCINOMA METASTATIC TO LIVER: ICD-10-CM

## 2017-09-27 PROCEDURE — 99214 OFFICE O/P EST MOD 30 MIN: CPT | Mod: ,,, | Performed by: INTERNAL MEDICINE

## 2017-09-27 RX ORDER — ESOMEPRAZOLE MAGNESIUM 40 MG/1
40 CAPSULE, DELAYED RELEASE ORAL
COMMUNITY
End: 2017-11-09 | Stop reason: ALTCHOICE

## 2017-09-27 NOTE — LETTER
October 2, 2017      MEMO Luther MD  [address]  [address]  [place] [address]           [facility]  [address] [place] [address] [place] [address]  Phone: [phone]  Fax: [phone]          Patient: Aurelia Massey   MR Number: [id number]   YOB: 1945   Date of Visit: 9/27/2017       Dear Dr. MEMO Luther:    Thank you for referring Aurelia Massey to me for evaluation. Attached you will find relevant portions of my assessment and plan of care.    If you have questions, please do not hesitate to call me. I look forward to following Aurelia Massey along with you.    Sincerely,    Lorna Luther    Enclosure  CC:  No Recipients    If you would like to receive this communication electronically, please contact [email or web address] or [phone] to request more information on [employer] access.    For providers and/or their staff who would like to refer a patient to [facility], please contact us through our one-stop-shop provider referral line, [facility], at [phone].    If you feel you have received this communication in error or would no longer like to receive these types of communications, please e-mail [email or web address]

## 2017-09-29 ENCOUNTER — TELEPHONE (OUTPATIENT)
Dept: INTERVENTIONAL RADIOLOGY/VASCULAR | Facility: HOSPITAL | Age: 72
End: 2017-09-29

## 2017-10-04 ENCOUNTER — HOSPITAL ENCOUNTER (OUTPATIENT)
Dept: RADIOLOGY | Facility: HOSPITAL | Age: 72
Discharge: HOME | End: 2017-10-04
Attending: RADIOLOGY
Payer: MEDICARE

## 2017-10-04 ENCOUNTER — HOSPITAL ENCOUNTER (OUTPATIENT)
Dept: RADIOLOGY | Facility: HOSPITAL | Age: 72
Discharge: HOME | End: 2017-10-04
Attending: RADIOLOGY | Admitting: RADIOLOGY
Payer: MEDICARE

## 2017-10-04 ENCOUNTER — HOSPITAL ENCOUNTER (OUTPATIENT)
Facility: HOSPITAL | Age: 72
Discharge: HOME | End: 2017-10-04
Attending: RADIOLOGY | Admitting: RADIOLOGY
Payer: MEDICARE

## 2017-10-04 VITALS
HEIGHT: 61 IN | TEMPERATURE: 99 F | BODY MASS INDEX: 18.88 KG/M2 | HEART RATE: 85 BPM | SYSTOLIC BLOOD PRESSURE: 128 MMHG | OXYGEN SATURATION: 98 % | WEIGHT: 100 LBS | RESPIRATION RATE: 16 BRPM | DIASTOLIC BLOOD PRESSURE: 60 MMHG

## 2017-10-04 DIAGNOSIS — C78.7 METASTATIC CANCER TO LIVER: ICD-10-CM

## 2017-10-04 LAB
ALBUMIN SERPL BCP-MCNC: 3.4 G/DL
ALP SERPL-CCNC: 171 U/L
ALT SERPL W/O P-5'-P-CCNC: 11 U/L
ANION GAP SERPL CALC-SCNC: 7 MMOL/L
AST SERPL-CCNC: 30 U/L
BILIRUB SERPL-MCNC: 0.4 MG/DL
BUN SERPL-MCNC: 10 MG/DL
CALCIUM SERPL-MCNC: 9.4 MG/DL
CHLORIDE SERPL-SCNC: 98 MMOL/L
CO2 SERPL-SCNC: 27 MMOL/L
CREAT SERPL-MCNC: 0.8 MG/DL
EST. GFR  (AFRICAN AMERICAN): >60 ML/MIN/1.73 M^2
EST. GFR  (NON AFRICAN AMERICAN): >60 ML/MIN/1.73 M^2
GLUCOSE SERPL-MCNC: 82 MG/DL
POTASSIUM SERPL-SCNC: 4.3 MMOL/L
PROT SERPL-MCNC: 7.5 G/DL
SODIUM SERPL-SCNC: 132 MMOL/L

## 2017-10-04 PROCEDURE — 25500020 PHARM REV CODE 255

## 2017-10-04 PROCEDURE — 77790 RADIATION HANDLING: CPT | Mod: TC

## 2017-10-04 PROCEDURE — 80053 COMPREHEN METABOLIC PANEL: CPT

## 2017-10-04 PROCEDURE — 78201 LIVER IMAGING STATIC ONLY: CPT | Mod: TC

## 2017-10-04 PROCEDURE — 63600175 PHARM REV CODE 636 W HCPCS: Performed by: RADIOLOGY

## 2017-10-04 PROCEDURE — 36415 COLL VENOUS BLD VENIPUNCTURE: CPT

## 2017-10-04 PROCEDURE — 78205 NM LIVER IMAGING SPECT: CPT | Mod: 26,,, | Performed by: RADIOLOGY

## 2017-10-04 PROCEDURE — C2616 BRACHYTX, NON-STR,YTTRIUM-90: HCPCS

## 2017-10-04 PROCEDURE — 25000003 PHARM REV CODE 250: Performed by: RADIOLOGY

## 2017-10-04 PROCEDURE — 78205 NM LIVER IMAGING SPECT: CPT | Mod: TC

## 2017-10-04 RX ORDER — MIDAZOLAM HYDROCHLORIDE 1 MG/ML
INJECTION, SOLUTION INTRAMUSCULAR; INTRAVENOUS CODE/TRAUMA/SEDATION MEDICATION
Status: COMPLETED | OUTPATIENT
Start: 2017-10-04 | End: 2017-10-04

## 2017-10-04 RX ORDER — FENTANYL CITRATE 50 UG/ML
INJECTION, SOLUTION INTRAMUSCULAR; INTRAVENOUS CODE/TRAUMA/SEDATION MEDICATION
Status: COMPLETED | OUTPATIENT
Start: 2017-10-04 | End: 2017-10-04

## 2017-10-04 RX ORDER — HYDROCODONE BITARTRATE AND ACETAMINOPHEN 5; 325 MG/1; MG/1
1 TABLET ORAL EVERY 4 HOURS PRN
Status: DISCONTINUED | OUTPATIENT
Start: 2017-10-04 | End: 2017-10-04 | Stop reason: HOSPADM

## 2017-10-04 RX ORDER — LIDOCAINE HYDROCHLORIDE 10 MG/ML
INJECTION INFILTRATION; PERINEURAL CODE/TRAUMA/SEDATION MEDICATION
Status: COMPLETED | OUTPATIENT
Start: 2017-10-04 | End: 2017-10-04

## 2017-10-04 RX ORDER — HYDROCODONE BITARTRATE AND ACETAMINOPHEN 5; 325 MG/1; MG/1
1 TABLET ORAL EVERY 4 HOURS PRN
Status: CANCELLED | OUTPATIENT
Start: 2017-10-04

## 2017-10-04 RX ORDER — METHYLPREDNISOLONE 4 MG/1
TABLET ORAL
Qty: 1 PACKAGE | Refills: 0 | Status: SHIPPED | OUTPATIENT
Start: 2017-10-04 | End: 2017-10-25

## 2017-10-04 RX ORDER — SODIUM CHLORIDE 9 MG/ML
INJECTION, SOLUTION INTRAVENOUS CONTINUOUS
Status: DISCONTINUED | OUTPATIENT
Start: 2017-10-04 | End: 2017-10-04 | Stop reason: HOSPADM

## 2017-10-04 RX ADMIN — SODIUM CHLORIDE: 900 INJECTION, SOLUTION INTRAVENOUS at 08:10

## 2017-10-04 RX ADMIN — FENTANYL CITRATE 50 MCG: 50 INJECTION, SOLUTION INTRAMUSCULAR; INTRAVENOUS at 09:10

## 2017-10-04 RX ADMIN — MIDAZOLAM 1 MG: 1 INJECTION INTRAMUSCULAR; INTRAVENOUS at 09:10

## 2017-10-04 RX ADMIN — LIDOCAINE HYDROCHLORIDE 5 ML: 10 INJECTION, SOLUTION INFILTRATION; PERINEURAL at 09:10

## 2017-10-04 NOTE — H&P
Radiology History & Physical      SUBJECTIVE:     Chief Complaint: Metastatic cancer    History of Present Illness:  Aurelia Massey is a 72 y.o. female who presents for Y90 delivery to [facility].  Past Medical History:   Diagnosis Date    Arthritis     Cancer     Cholangiocarcinoma 12/2016    liver first round chemo 1/30/2017     Encounter for blood transfusion     History of ITP     1980's    Rheumatoid arthritis involving both hands      Past Surgical History:   Procedure Laterality Date    CHOLECYSTECTOMY      COLONOSCOPY      SPLENECTOMY, TOTAL  1980's       Home Meds:   Prior to Admission medications    Medication Sig Start Date End Date Taking? Authorizing Provider   calcium-vitamin D3 (CALCIUM 500 + D) 500 mg(1,250mg) -200 unit per tablet Take 2 tablets by mouth every other day.   Yes Historical Provider, MD   esomeprazole (NEXIUM) 40 MG capsule Take 40 mg by mouth before breakfast.   Yes Historical Provider, MD   hydrocodone-acetaminophen 5-325mg (NORCO) 5-325 mg per tablet Take 1 tablet by mouth every 6 (six) hours as needed for Pain. 8/11/17  Yes Adryan Agudelo MD   multivitamin capsule Take 1 capsule by mouth once daily.   Yes Historical Provider, MD   ondansetron (ZOFRAN-ODT) 8 MG TbDL Take 4 mg by mouth every 6 (six) hours as needed (nausea).   Yes Historical Provider, MD   ondansetron (ZOFRAN) 8 MG tablet TAKE 1 TABLET BY MOUTH EVERY 8 HOURS AS NEEDED FOR NAUSEA AND VOMITING 3/17/17   Historical Provider, MD     Anticoagulants/Antiplatelets: no anticoagulation    Allergies:   Review of patient's allergies indicates:   Allergen Reactions    Plaquenil [hydroxychloroquine] Palpitations     Sedation History:  no adverse reactions    Review of Systems:   Hematological: no known coagulopathies  Respiratory: no shortness of breath  Cardiovascular: no chest pain  Gastrointestinal: no abdominal pain  Genito-Urinary: no dysuria  Musculoskeletal: negative  Neurological: no TIA or stroke symptoms          OBJECTIVE:     Vital Signs (Most Recent)  Temp: 98.2 °F (36.8 °C) (10/04/17 0802)  Pulse: 79 (10/04/17 0802)  Resp: 16 (10/04/17 0802)  BP: 134/60 (10/04/17 0802)  SpO2: 95 % (10/04/17 0802)    Physical Exam:  ASA: 2  Mallampati: 2    General: no acute distress  Mental Status: alert and oriented to person, place and time  HEENT: normocephalic, atraumatic  Chest: unlabored breathing  Heart: regular heart rate  Abdomen: nondistended  Extremity: moves all extremities    Laboratory  Lab Results   Component Value Date    INR 1.0 09/22/2017       Lab Results   Component Value Date    WBC 6.97 09/22/2017    HGB 11.0 (L) 09/22/2017    HCT 33.0 (L) 09/22/2017    MCV 94 09/22/2017     09/22/2017      Lab Results   Component Value Date    GLU 82 10/04/2017     (L) 10/04/2017    K 4.3 10/04/2017    CL 98 10/04/2017    CO2 27 10/04/2017    BUN 10 10/04/2017    CREATININE 0.8 10/04/2017    CALCIUM 9.4 10/04/2017    ALT 11 10/04/2017    AST 30 10/04/2017    ALBUMIN 3.4 (L) 10/04/2017    BILITOT 0.4 10/04/2017    BILIDIR 0.4 (H) 02/01/2017       ASSESSMENT/PLAN:     Sedation Plan: Moderate  Patient will undergo Y90 delivery to [facility] as planned before.    Ryan Bronson M.D.  Diagnostic and Interventional Radiologist  Department of Radiology  Pager: [phone]

## 2017-10-04 NOTE — DISCHARGE SUMMARY
Radiology Discharge Summary      Hospital Course: No complications    Admit Date: 10/4/2017  Discharge Date: 10/04/2017     Instructions Given to Patient: Yes  Diet: Resume prior diet  Activity: activity as tolerated and no driving for today    Description of Condition on Discharge: Stable  Vital Signs (Most Recent): Temp: 98.2 °F (36.8 °C) (10/04/17 0802)  Pulse: 76 (10/04/17 1015)  Resp: 16 (10/04/17 1015)  BP: 135/78 (10/04/17 1015)  SpO2: 98 % (10/04/17 1015)    Discharge Disposition: Home    Discharge Diagnosis: metastatic cancer. F/U as scheduled.    Ryan Bronson M.D.  Diagnostic and Interventional Radiologist  Department of Radiology  Pager: [phone]

## 2017-10-04 NOTE — NURSING
Pt. Able to tolerate cracker and water. D/c instructions given to the pt spouse and daughter. Verb. Understanding. Pt. Stable. Right groin CDI. Pt. Transported to private vehicle via w/c by nurse.

## 2017-10-04 NOTE — DISCHARGE INSTRUCTIONS
Hepatic Angiography  Hepatic angiography is an imaging test. It uses X-rays to look at the blood vessels that send blood to your liver. The test uses a thin, flexible tube (catheter). The catheter is put into a blood vessel through a small cut (incision). X-ray dye (contrast medium) is then injected into the catheter. The dye makes your blood vessels show up more clearly on the X-rays. This procedure is usually done by an interventional radiologist.    Risks of hepatic angiography  All procedures have some risks. The risks of this test include:  · Bruising at the insertion site  · Problems due to the X-ray dye, such as an allergic reaction or kidney damage  · Damage to your artery  ·    Getting ready for your procedure  Tell your healthcare provider if you:  · Are pregnant or think you may be pregnant  · Are breastfeeding  · Are allergic to X-ray dye or other medicines  Be sure your provider knows about any medicines, herbs, or supplements you are taking. You may need to stop taking all or some of these before your test. This includes:  · All prescription medicines  · Over-the-counter medicines such as aspirin or ibuprofen  · Street drugs  You may be told not to eat or drink after midnight the night before your test. Follow any other instructions from your provider.  Also make sure to have a family member or friend take you home from the hospital. You wont be able to drive yourself.     During your procedure  · You will change into a hospital gown and lie on an X-ray table.  · An IV (intravenous) line will be put in a vein in your arm or hand. Youll receive fluids and medicines through this IV.  · You may be given medicine to help you relax and make you sleepy (sedation).  · You may be given medicine (local anesthesia) to numb the skin near your groin. A guide wire is then put through the skin into a large artery in your thigh (femoral artery).  · Using X-ray images as a guide, the radiologist will thread the  wire through your arteries to your liver. A catheter is then put over the guide wire. The guide wire is then taken out.  · X-ray dye will be injected into your artery through the catheter. This helps the arteries in your liver show clearly on X-rays.  · You will have to keep still and sometimes hold your breath while X-ray pictures of your liver are taken. You may need to change position so that images may be taken from different angles.  · When the test is done, the catheter is taken out. Pressure will be put on the insertion site for 10 to 15 minutes to stop bleeding.  ·   After your procedure  · You will be asked to lie flat with your leg stretched out for 2 hours to prevent bleeding at the insertion site.  · You may be able to go home that day. Or you may be asked to stay in the hospital overnight. You should have a friend or family member drive you home.   · Drink plenty of water to help flush the X-ray dye from your body.  · Care for the insertion site as directed by your provider.  ·   When to call your healthcare provider  Call your provider if you have a lump or bleeding at the insertion site.     .

## 2017-10-04 NOTE — PROCEDURES
Radiology Post-Procedure Note    Pre Op Diagnosis: metastatic cancer    Post Op Diagnosis: Same    Procedure: Y90 delivery    Procedure performed by: Ryan Bronson MD    Written Informed Consent Obtained: Yes  Specimen Removed: NO  Estimated Blood Loss: Minimal    Findings:   Via rt CFA celiac and segment 5/8 angio was performed. Y90 (4Gbq) delivered to [facility] branch supplying segments 5/8. Exoseal to rt cfa. No complications.    Patient tolerated procedure well.    Ryan Bronson M.D.  Diagnostic and Interventional Radiologist  Department of Radiology  Pager: [phone]

## 2017-10-08 NOTE — PROGRESS NOTES
[facility] HEME/ONC PROGRESS NOTE      Subjective:       Patient ID:   Aurelia Massey  72 y.o. female.  1945      Chief Complaint:  Here to review chemo status    History of Present Illness:     Patient returns today for a regularly scheduled follow-up visit.   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    No c/o.  No  N/V/ D/ C.  No pain.    She saw Dr. Luther, cholecystectomy done.    Bx of liver and mapping of liver done. 9/22/17.  Due for Y 90 10/4/17.    Fatigue +  Appetite, taste better.  On nexium x 2 months.      ROS:   GEN: normal without any fever, night sweats or weight loss  HEENT: normal with no HA's, sore throat, stiff neck, changes in vision  CV: normal with no CP, SOB, PND, CAMPBELL or orthopnea  PULM: normal with no SOB, cough, hemoptysis, sputum or pleuritic pain  GI: See HPI.  no abdominal pain, nausea, vomiting, constipation, diarrhea, melanotic stools, BRBPR, or hematemesis  : normal with no hematuria, dysuria  BREAST: normal with no mass, discharge, pain  SKIN: normal with no rash, erythema, bruising, or swelling    Allergies:  Review of patient's allergies indicates:   Allergen Reactions    Plaquenil [hydroxychloroquine] Palpitations       Medications:    Current Outpatient Prescriptions:     calcium-vitamin D3 (CALCIUM 500 + D) 500 mg(1,250mg) -200 unit per tablet, Take 2 tablets by mouth every other day., Disp: , Rfl:     esomeprazole (NEXIUM) 40 MG capsule, Take 40 mg by mouth before breakfast., Disp: , Rfl:     hydrocodone-acetaminophen 5-325mg (NORCO) 5-325 mg per tablet, Take 1 tablet by mouth every 6 (six) hours as needed for Pain., Disp: 30 tablet, Rfl: 0    multivitamin capsule, Take 1 capsule by mouth once daily., Disp: , Rfl:     methylPREDNISolone (MEDROL DOSEPACK) 4 mg tablet, use as directed, Disp: 1 Package, Rfl: 0    ondansetron (ZOFRAN) 8 MG tablet, TAKE 1 TABLET BY MOUTH EVERY 8 HOURS  AS NEEDED FOR NAUSEA AND VOMITING, Disp: , Rfl: 2    ondansetron (ZOFRAN-ODT) 8 MG TbDL, Take 4 mg by mouth every 6 (six) hours as needed (nausea)., Disp: , Rfl:       Objective:     Vitals:  Blood pressure 114/70, pulse 71, temperature 98 °F (36.7 °C), resp. rate 20, weight 45.4 kg (100 lb).    Physical Examination:   GEN: no apparent distress, comfortable; AAOx3  HEAD: atraumatic and normocephalic  EYES: no pallor, no icterus, PERRLA  ENT: OMM, no pharyngeal erythema, external ears WNL; no nasal discharge; no thrush  NECK: no masses, thyroid normal, trachea midline, no LAD/LN's, supple  CV: RRR with no murmur; normal pulse; normal S1 and S2; no pedal edema  CHEST: Normal respiratory effort; CTAB; normal breath sounds; no wheeze or crackles  ABDOM: nontender and nondistended; soft; normal bowel sounds; no rebound/guarding  MUSC/Skeletal: ROM normal; no crepitus; joints normal; no deformities or arthropathy  EXTREM: no clubbing, cyanosis, inflammation or swelling  SKIN: no rashes, lesions, ulcers, petechiae or subcutaneous nodules  : no valenzuela  NEURO: grossly intact; motor/sensory WNL; AAOx3; no tremors  PSYCH: normal mood, affect and behavior  LYMPH: normal cervical, supraclavicular, axillary and groin LN's            Labs:   Lab Results   Component Value Date    WBC 6.97 09/22/2017    HGB 11.0 (L) 09/22/2017    HCT 33.0 (L) 09/22/2017    MCV 94 09/22/2017     09/22/2017    CMP  Sodium   Date Value Ref Range Status   10/04/2017 132 (L) 136 - 145 mmol/L Final   07/18/2017 129 (L) 134 - 144 mmol/L      Potassium   Date Value Ref Range Status   10/04/2017 4.3 3.5 - 5.1 mmol/L Final     Chloride   Date Value Ref Range Status   10/04/2017 98 95 - 110 mmol/L Final   07/18/2017 98 98 - 110 mmol/L      CO2   Date Value Ref Range Status   10/04/2017 27 23 - 29 mmol/L Final     Glucose   Date Value Ref Range Status   10/04/2017 82 70 - 110 mg/dL Final   07/18/2017 89 70 - 99 mg/dL      BUN, Bld   Date Value Ref  Range Status   10/04/2017 10 8 - 23 mg/dL Final     Creatinine   Date Value Ref Range Status   10/04/2017 0.8 0.5 - 1.4 mg/dL Final   07/18/2017 0.52 (L) 0.60 - 1.40 mg/dL      Calcium   Date Value Ref Range Status   10/04/2017 9.4 8.7 - 10.5 mg/dL Final     Total Protein   Date Value Ref Range Status   10/04/2017 7.5 6.0 - 8.4 g/dL Final     Albumin   Date Value Ref Range Status   10/04/2017 3.4 (L) 3.5 - 5.2 g/dL Final   07/18/2017 3.1 3.1 - 4.7 g/dL      Total Bilirubin   Date Value Ref Range Status   10/04/2017 0.4 0.1 - 1.0 mg/dL Final     Comment:     For infants and newborns, interpretation of results should be based  on gestational age, weight and in agreement with clinical  observations.  Premature Infant recommended reference ranges:  Up to 24 hours.............<8.0 mg/dL  Up to 48 hours............<12.0 mg/dL  3-5 days..................<15.0 mg/dL  6-29 days.................<15.0 mg/dL       Alkaline Phosphatase   Date Value Ref Range Status   10/04/2017 171 (H) 55 - 135 U/L Final     AST   Date Value Ref Range Status   10/04/2017 30 10 - 40 U/L Final     ALT   Date Value Ref Range Status   10/04/2017 11 10 - 44 U/L Final     Anion Gap   Date Value Ref Range Status   10/04/2017 7 (L) 8 - 16 mmol/L Final     eGFR if    Date Value Ref Range Status   10/04/2017 >60 >60 mL/min/1.73 m^2 Final     eGFR if non    Date Value Ref Range Status   10/04/2017 >60 >60 mL/min/1.73 m^2 Final     Comment:     Calculation used to obtain the estimated glomerular filtration  rate (eGFR) is the CKD-EPI equation. Since race is unknown   in our information system, the eGFR values for   -American and Non--American patients are given   for each creatinine result.       I have reviewed all available lab results and radiology reports.    Radiology/Diagnostic Studies:          Assessment/Plan:   (1) 72 y.o. female with diagnosis of metastatic cancer of unknown primary, likely  hepatocellular or cholangiobiliary Ca.  Responding to chemo..  Abraxane gemzar.     (2) For Y 90 10/4/17.    (3)S/P cholecystectomy.    RTC 1 month.                  Reactions     Palpitations

## 2017-10-10 RX ORDER — HEPARIN 100 UNIT/ML
500 SYRINGE INTRAVENOUS
Status: CANCELLED | OUTPATIENT
Start: 2017-10-10

## 2017-10-10 RX ORDER — SODIUM CHLORIDE 0.9 % (FLUSH) 0.9 %
10 SYRINGE (ML) INJECTION
Status: CANCELLED | OUTPATIENT
Start: 2017-10-10

## 2017-10-11 ENCOUNTER — TELEPHONE (OUTPATIENT)
Dept: INTERVENTIONAL RADIOLOGY/VASCULAR | Facility: HOSPITAL | Age: 72
End: 2017-10-11

## 2017-10-11 ENCOUNTER — OFFICE VISIT (OUTPATIENT)
Dept: HEMATOLOGY/ONCOLOGY | Facility: CLINIC | Age: 72
End: 2017-10-11
Payer: MEDICARE

## 2017-10-11 VITALS
RESPIRATION RATE: 18 BRPM | WEIGHT: 100 LBS | SYSTOLIC BLOOD PRESSURE: 102 MMHG | HEART RATE: 84 BPM | DIASTOLIC BLOOD PRESSURE: 65 MMHG | BODY MASS INDEX: 18.89 KG/M2 | TEMPERATURE: 98 F

## 2017-10-11 DIAGNOSIS — D64.81 ANEMIA DUE TO ANTINEOPLASTIC CHEMOTHERAPY: ICD-10-CM

## 2017-10-11 DIAGNOSIS — C22.1 CHOLANGIOCARCINOMA METASTATIC TO LIVER: Primary | ICD-10-CM

## 2017-10-11 DIAGNOSIS — T45.1X5A ANEMIA DUE TO ANTINEOPLASTIC CHEMOTHERAPY: ICD-10-CM

## 2017-10-11 DIAGNOSIS — C78.7 CHOLANGIOCARCINOMA METASTATIC TO LIVER: Primary | ICD-10-CM

## 2017-10-11 PROCEDURE — 99214 OFFICE O/P EST MOD 30 MIN: CPT | Mod: ,,, | Performed by: INTERNAL MEDICINE

## 2017-10-11 NOTE — PROGRESS NOTES
[facility] HEME/ONC PROGRESS NOTE      Subjective:       Patient ID:   Aurelia Massey  72 y.o. female.  1945      Chief Complaint:  Here for follow-up following Y 90 treatment    History of Present Illness:     Patient returns today for a regularly scheduled follow-up visit.   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    No c/o.  No  N/V/ D/ C.  No pain.    She saw Dr. Luther, cholecystectomy done.    Bx of liver and mapping of liver done. 9/22/17.  Right hepatic infusion of Y 90 was accomplished per Dr. Turner on 10/04/2017. She did experience some nausea and some right upper quadrant pains after the procedure but the symptoms have improved over the last week.    Fatigue +  Appetite, taste better.  On nexium x 2 months.      ROS:   GEN: normal without any fever, night sweats or weight loss  HEENT: normal with no HA's, sore throat, stiff neck, changes in vision  CV: normal with no CP, SOB, PND, CAMPBELL or orthopnea  PULM: normal with no SOB, cough, hemoptysis, sputum or pleuritic pain  GI: See HPI.  no abdominal pain, nausea, vomiting, constipation, diarrhea, melanotic stools, BRBPR, or hematemesis  : normal with no hematuria, dysuria  BREAST: normal with no mass, discharge, pain  SKIN: normal with no rash, erythema, bruising, or swelling    Allergies:  Review of patient's allergies indicates:   Allergen Reactions    Plaquenil [hydroxychloroquine] Palpitations       Medications:    Current Outpatient Prescriptions:     hydrocodone-acetaminophen 5-325mg (NORCO) 5-325 mg per tablet, Take 1 tablet by mouth every 6 (six) hours as needed for Pain., Disp: 30 tablet, Rfl: 0    multivitamin capsule, Take 1 capsule by mouth once daily., Disp: , Rfl:     ondansetron (ZOFRAN) 8 MG tablet, TAKE 1 TABLET BY MOUTH EVERY 8 HOURS AS NEEDED FOR NAUSEA AND VOMITING, Disp: , Rfl: 2    calcium-vitamin D3 (CALCIUM 500 + D) 500 mg(1,250mg) -200  unit per tablet, Take 2 tablets by mouth every other day., Disp: , Rfl:     esomeprazole (NEXIUM) 40 MG capsule, Take 40 mg by mouth before breakfast., Disp: , Rfl:     methylPREDNISolone (MEDROL DOSEPACK) 4 mg tablet, use as directed, Disp: 1 Package, Rfl: 0    ondansetron (ZOFRAN-ODT) 8 MG TbDL, Take 4 mg by mouth every 6 (six) hours as needed (nausea)., Disp: , Rfl:       Objective:     Vitals:  Blood pressure 102/65, pulse 84, temperature 97.6 °F (36.4 °C), resp. rate 18, weight 45.4 kg (100 lb).    Physical Examination:   GEN: no apparent distress, comfortable  HEAD: atraumatic and normocephalic  EYES: no pallor, no icterus  ENT:  no pharyngeal erythema, external ears WNL; no nasal discharge  NECK: no masses, thyroid normal, trachea midline, no LAD/LN's, supple  CV: RRR with no murmur; normal pulse; normal S1 and S2; no pedal edema  CHEST: Normal respiratory effort; CTAB; normal breath sounds; no wheeze or crackles  ABDOM: nontender and nondistended; soft; normal bowel sounds; no rebound/guarding  MUSC/Skeletal: ROM normal; no crepitus; joints normal  EXTREM: no clubbing, cyanosis, inflammation or swelling  SKIN: no rashes, lesions, ulcers, petechiae or subcutaneous nodules  : no valenzuela  NEURO: grossly intact; motor/sensory WNL;no tremors  PSYCH: normal mood, affect and behavior  LYMPH: normal cervical, supraclavicular, axillary and groin LN's    Labs:   Lab Results   Component Value Date    WBC 6.97 09/22/2017    HGB 11.0 (L) 09/22/2017    HCT 33.0 (L) 09/22/2017    MCV 94 09/22/2017     09/22/2017    CMP  Sodium   Date Value Ref Range Status   10/04/2017 132 (L) 136 - 145 mmol/L Final   07/18/2017 129 (L) 134 - 144 mmol/L      Potassium   Date Value Ref Range Status   10/04/2017 4.3 3.5 - 5.1 mmol/L Final     Chloride   Date Value Ref Range Status   10/04/2017 98 95 - 110 mmol/L Final   07/18/2017 98 98 - 110 mmol/L      CO2   Date Value Ref Range Status   10/04/2017 27 23 - 29 mmol/L Final      Glucose   Date Value Ref Range Status   10/04/2017 82 70 - 110 mg/dL Final   07/18/2017 89 70 - 99 mg/dL      BUN, Bld   Date Value Ref Range Status   10/04/2017 10 8 - 23 mg/dL Final     Creatinine   Date Value Ref Range Status   10/04/2017 0.8 0.5 - 1.4 mg/dL Final   07/18/2017 0.52 (L) 0.60 - 1.40 mg/dL      Calcium   Date Value Ref Range Status   10/04/2017 9.4 8.7 - 10.5 mg/dL Final     Total Protein   Date Value Ref Range Status   10/04/2017 7.5 6.0 - 8.4 g/dL Final     Albumin   Date Value Ref Range Status   10/04/2017 3.4 (L) 3.5 - 5.2 g/dL Final   07/18/2017 3.1 3.1 - 4.7 g/dL      Total Bilirubin   Date Value Ref Range Status   10/04/2017 0.4 0.1 - 1.0 mg/dL Final     Comment:     For infants and newborns, interpretation of results should be based  on gestational age, weight and in agreement with clinical  observations.  Premature Infant recommended reference ranges:  Up to 24 hours.............<8.0 mg/dL  Up to 48 hours............<12.0 mg/dL  3-5 days..................<15.0 mg/dL  6-29 days.................<15.0 mg/dL       Alkaline Phosphatase   Date Value Ref Range Status   10/04/2017 171 (H) 55 - 135 U/L Final     AST   Date Value Ref Range Status   10/04/2017 30 10 - 40 U/L Final     ALT   Date Value Ref Range Status   10/04/2017 11 10 - 44 U/L Final     Anion Gap   Date Value Ref Range Status   10/04/2017 7 (L) 8 - 16 mmol/L Final     eGFR if    Date Value Ref Range Status   10/04/2017 >60 >60 mL/min/1.73 m^2 Final     eGFR if non    Date Value Ref Range Status   10/04/2017 >60 >60 mL/min/1.73 m^2 Final     Comment:     Calculation used to obtain the estimated glomerular filtration  rate (eGFR) is the CKD-EPI equation. Since race is unknown   in our information system, the eGFR values for   -American and Non--American patients are given   for each creatinine result.           Radiology/Diagnostic Studies:          Assessment/Plan:   (1) 72 y.o.  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  Responding to chemo..  Abraxane gemzar.     (2) Status post Y 90 right hepatic administration on 10/04/2017    (3)S/P cholecystectomy.    RTC 1 month. Further systemic chemotherapy on hold for now.                  Reactions     Palpitations

## 2017-11-09 ENCOUNTER — OFFICE VISIT (OUTPATIENT)
Dept: HEMATOLOGY/ONCOLOGY | Facility: CLINIC | Age: 72
End: 2017-11-09
Payer: MEDICARE

## 2017-11-09 VITALS
DIASTOLIC BLOOD PRESSURE: 81 MMHG | WEIGHT: 92 LBS | HEART RATE: 98 BPM | BODY MASS INDEX: 17.38 KG/M2 | RESPIRATION RATE: 18 BRPM | SYSTOLIC BLOOD PRESSURE: 120 MMHG | TEMPERATURE: 98 F

## 2017-11-09 DIAGNOSIS — C22.1 CHOLANGIOCARCINOMA METASTATIC TO LIVER: ICD-10-CM

## 2017-11-09 DIAGNOSIS — C78.7 METASTATIC CANCER TO LIVER: Primary | ICD-10-CM

## 2017-11-09 DIAGNOSIS — C78.7 CHOLANGIOCARCINOMA METASTATIC TO LIVER: ICD-10-CM

## 2017-11-09 DIAGNOSIS — K81.1 CHRONIC CHOLECYSTITIS: ICD-10-CM

## 2017-11-09 PROCEDURE — 99214 OFFICE O/P EST MOD 30 MIN: CPT | Mod: ,,, | Performed by: INTERNAL MEDICINE

## 2017-11-09 RX ORDER — ADHESIVE BANDAGE
30 BANDAGE TOPICAL DAILY PRN
COMMUNITY

## 2017-11-09 RX ORDER — SYRING-NEEDL,DISP,INSUL,0.3 ML 29 G X1/2"
296 SYRINGE, EMPTY DISPOSABLE MISCELLANEOUS ONCE
COMMUNITY

## 2017-11-09 NOTE — PROGRESS NOTES
[facility] HEME/ONC PROGRESS NOTE      Subjective:       Patient ID:   Aurelia Massey  72 y.o. female.  1945      Chief Complaint:  Cancer follow up    History of Present Illness:     Patient returns today for a regularly scheduled follow-up visit.   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    C/O decreased appetite and constipation sx.   No  N/V/ D.     No pain.    She saw Dr. Luther, cholecystectomy done.    Bx of liver and mapping of liver done. 9/22/17.  Had Y 90 10/4/17. Per Santosh Luther/Johnny.  Due for follow up scan 12/2017.    Fatigue +  Appetite, taste better.  On nexium x 2 months.      ROS:   GEN: normal without any fever, night sweats or weight loss  HEENT: normal with no HA's, sore throat, stiff neck, changes in vision  CV: normal with no CP, SOB, PND, CAMPBELL or orthopnea  PULM: normal with no SOB, cough, hemoptysis, sputum or pleuritic pain  GI: See HPI.  no abdominal pain, nausea, vomiting,  diarrhea, melanotic stools, BRBPR, or hematemesis  : normal with no hematuria, dysuria  BREAST: normal with no mass, discharge, pain  SKIN: normal with no rash, erythema, bruising, or swelling    Allergies:  Review of patient's allergies indicates:   Allergen Reactions    Plaquenil [hydroxychloroquine] Palpitations       Medications:    Current Outpatient Prescriptions:     calcium-vitamin D3 (CALCIUM 500 + D) 500 mg(1,250mg) -200 unit per tablet, Take 2 tablets by mouth every other day., Disp: , Rfl:     esomeprazole (NEXIUM) 40 MG capsule, Take 40 mg by mouth before breakfast., Disp: , Rfl:     hydrocodone-acetaminophen 5-325mg (NORCO) 5-325 mg per tablet, Take 1 tablet by mouth every 6 (six) hours as needed for Pain., Disp: 30 tablet, Rfl: 0    multivitamin capsule, Take 1 capsule by mouth once daily., Disp: , Rfl:     ondansetron (ZOFRAN) 8 MG tablet, TAKE 1 TABLET BY MOUTH EVERY 8 HOURS AS NEEDED FOR NAUSEA AND  VOMITING, Disp: , Rfl: 2    ondansetron (ZOFRAN-ODT) 8 MG TbDL, Take 4 mg by mouth every 6 (six) hours as needed (nausea)., Disp: , Rfl:       Objective:     Vitals:  There were no vitals taken for this visit.    Physical Examination:   GEN: chronically ill in appearance, thin  HEAD: atraumatic and normocephalic  EYES: +  pallor, no icterus  ENT:  no pharyngeal erythema, external ears WNL; no nasal discharge  NECK: no masses, thyroid normal, trachea midline, no LAD/LN's, supple  CV: RRR with no murmur; normal pulse; normal S1 and S2; no pedal edema  CHEST: Normal respiratory effort; CTAB; normal breath sounds; no wheeze or crackles  ABDOM: nontender and nondistended; soft; normal bowel sounds; no rebound/guarding  MUSC/Skeletal: ROM normal; no crepitus; joints normal  EXTREM: no clubbing, cyanosis, inflammation or swelling  SKIN: no rashes, lesions, ulcers, petechiae   : no valenzuela  NEURO: grossly intact; motor/sensory WNL;  no tremors  PSYCH: normal mood, affect and behavior  LYMPH: normal cervical, supraclavicular, axillary and groin LN's      Labs:   Lab Results   Component Value Date    WBC 6.97 09/22/2017    HGB 11.0 (L) 09/22/2017    HCT 33.0 (L) 09/22/2017    MCV 94 09/22/2017     09/22/2017    CMP  Sodium   Date Value Ref Range Status   10/04/2017 132 (L) 136 - 145 mmol/L Final   07/18/2017 129 (L) 134 - 144 mmol/L      Potassium   Date Value Ref Range Status   10/04/2017 4.3 3.5 - 5.1 mmol/L Final     Chloride   Date Value Ref Range Status   10/04/2017 98 95 - 110 mmol/L Final   07/18/2017 98 98 - 110 mmol/L      CO2   Date Value Ref Range Status   10/04/2017 27 23 - 29 mmol/L Final     Glucose   Date Value Ref Range Status   10/04/2017 82 70 - 110 mg/dL Final   07/18/2017 89 70 - 99 mg/dL      BUN, Bld   Date Value Ref Range Status   10/04/2017 10 8 - 23 mg/dL Final     Creatinine   Date Value Ref Range Status   10/04/2017 0.8 0.5 - 1.4 mg/dL Final   07/18/2017 0.52 (L) 0.60 - 1.40 mg/dL       Calcium   Date Value Ref Range Status   10/04/2017 9.4 8.7 - 10.5 mg/dL Final     Total Protein   Date Value Ref Range Status   10/04/2017 7.5 6.0 - 8.4 g/dL Final     Albumin   Date Value Ref Range Status   10/04/2017 3.4 (L) 3.5 - 5.2 g/dL Final   07/18/2017 3.1 3.1 - 4.7 g/dL      Total Bilirubin   Date Value Ref Range Status   10/04/2017 0.4 0.1 - 1.0 mg/dL Final     Comment:     For infants and newborns, interpretation of results should be based  on gestational age, weight and in agreement with clinical  observations.  Premature Infant recommended reference ranges:  Up to 24 hours.............<8.0 mg/dL  Up to 48 hours............<12.0 mg/dL  3-5 days..................<15.0 mg/dL  6-29 days.................<15.0 mg/dL       Alkaline Phosphatase   Date Value Ref Range Status   10/04/2017 171 (H) 55 - 135 U/L Final     AST   Date Value Ref Range Status   10/04/2017 30 10 - 40 U/L Final     ALT   Date Value Ref Range Status   10/04/2017 11 10 - 44 U/L Final     Anion Gap   Date Value Ref Range Status   10/04/2017 7 (L) 8 - 16 mmol/L Final     eGFR if    Date Value Ref Range Status   10/04/2017 >60 >60 mL/min/1.73 m^2 Final     eGFR if non    Date Value Ref Range Status   10/04/2017 >60 >60 mL/min/1.73 m^2 Final     Comment:     Calculation used to obtain the estimated glomerular filtration  rate (eGFR) is the CKD-EPI equation. Since race is unknown   in our information system, the eGFR values for   -American and Non--American patients are given   for each creatinine result.           Radiology/Diagnostic Studies:    Due for CAT 12/2017.      Assessment/Plan:   (1) 72 y.o. 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  Responding to chemo..  Abraxane gemzar.     (2) Had  Y 90 10/4/17.  For scan 12/2017.    (3)S/P cholecystectomy.    Loss of appetite, weight, constipation sx.  Add Marinol 2.5 mg 1/day x's  7 days.  Chronulac syrup BID.    RTC 1 week.                  Reactions     Palpitations

## 2017-11-09 NOTE — PROGRESS NOTES
[facility] HEME/ONC PROGRESS NOTE      Subjective:       Patient ID:   Aurelia Massey  72 y.o. female.  1945      Chief Complaint:  Here for follow-up following Y 90 treatment    History of Present Illness:     Patient returns today for a regularly scheduled follow-up visit.   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    No c/o.  No  N/V/ D/ C.  No pain.    She saw Dr. Luther, cholecystectomy done.    Bx of liver and mapping of liver done. 9/22/17.  Right hepatic infusion of Y 90 was accomplished per Dr. Turner on 10/04/2017. She did experience some nausea and some right upper quadrant pains after the procedure but the symptoms have improved over the last week.    Fatigue +  Appetite, taste better.  On nexium x 2 months.      ROS:   GEN: normal without any fever, night sweats or weight loss  HEENT: normal with no HA's, sore throat, stiff neck, changes in vision  CV: normal with no CP, SOB, PND, CAMPBELL or orthopnea  PULM: normal with no SOB, cough, hemoptysis, sputum or pleuritic pain  GI: See HPI.  no abdominal pain, nausea, vomiting, constipation, diarrhea, melanotic stools, BRBPR, or hematemesis  : normal with no hematuria, dysuria  BREAST: normal with no mass, discharge, pain  SKIN: normal with no rash, erythema, bruising, or swelling    Allergies:  Review of patient's allergies indicates:   Allergen Reactions    Plaquenil [hydroxychloroquine] Palpitations       Medications:    Current Outpatient Prescriptions:     calcium-vitamin D3 (CALCIUM 500 + D) 500 mg(1,250mg) -200 unit per tablet, Take 2 tablets by mouth every other day., Disp: , Rfl:     esomeprazole (NEXIUM) 40 MG capsule, Take 40 mg by mouth before breakfast., Disp: , Rfl:     hydrocodone-acetaminophen 5-325mg (NORCO) 5-325 mg per tablet, Take 1 tablet by mouth every 6 (six) hours as needed for Pain., Disp: 30 tablet, Rfl: 0    magnesium citrate solution, Take  296 mLs by mouth once., Disp: , Rfl:     magnesium hydroxide 400 mg/5 ml (MILK OF MAGNESIA) 400 mg/5 mL Susp, Take 30 mLs by mouth daily as needed., Disp: , Rfl:     multivitamin capsule, Take 1 capsule by mouth once daily., Disp: , Rfl:     ondansetron (ZOFRAN) 8 MG tablet, TAKE 1 TABLET BY MOUTH EVERY 8 HOURS AS NEEDED FOR NAUSEA AND VOMITING, Disp: , Rfl: 2    ondansetron (ZOFRAN-ODT) 8 MG TbDL, Take 4 mg by mouth every 6 (six) hours as needed (nausea)., Disp: , Rfl:       Objective:     Vitals:  Blood pressure 120/81, pulse 98, temperature 98.1 °F (36.7 °C), resp. rate 18, weight 41.7 kg (92 lb).    Physical Examination:   GEN: no apparent distress, comfortable  HEAD: atraumatic and normocephalic  EYES: no pallor, no icterus  ENT:  no pharyngeal erythema, external ears WNL; no nasal discharge  NECK: no masses, thyroid normal, trachea midline, no LAD/LN's, supple  CV: RRR with no murmur; normal pulse; normal S1 and S2; no pedal edema  CHEST: Normal respiratory effort; CTAB; normal breath sounds; no wheeze or crackles  ABDOM: nontender and nondistended; soft; normal bowel sounds; no rebound/guarding  MUSC/Skeletal: ROM normal; no crepitus; joints normal  EXTREM: no clubbing, cyanosis, inflammation or swelling  SKIN: no rashes, lesions, ulcers, petechiae or subcutaneous nodules  : no valenzuela  NEURO: grossly intact; motor/sensory WNL;no tremors  PSYCH: normal mood, affect and behavior  LYMPH: normal cervical, supraclavicular, axillary and groin LN's    Labs:   Lab Results   Component Value Date    WBC 6.97 09/22/2017    HGB 11.0 (L) 09/22/2017    HCT 33.0 (L) 09/22/2017    MCV 94 09/22/2017     09/22/2017    CMP  Sodium   Date Value Ref Range Status   10/04/2017 132 (L) 136 - 145 mmol/L Final   07/18/2017 129 (L) 134 - 144 mmol/L      Potassium   Date Value Ref Range Status   10/04/2017 4.3 3.5 - 5.1 mmol/L Final     Chloride   Date Value Ref Range Status   10/04/2017 98 95 - 110 mmol/L Final   07/18/2017  98 98 - 110 mmol/L      CO2   Date Value Ref Range Status   10/04/2017 27 23 - 29 mmol/L Final     Glucose   Date Value Ref Range Status   10/04/2017 82 70 - 110 mg/dL Final   07/18/2017 89 70 - 99 mg/dL      BUN, Bld   Date Value Ref Range Status   10/04/2017 10 8 - 23 mg/dL Final     Creatinine   Date Value Ref Range Status   10/04/2017 0.8 0.5 - 1.4 mg/dL Final   07/18/2017 0.52 (L) 0.60 - 1.40 mg/dL      Calcium   Date Value Ref Range Status   10/04/2017 9.4 8.7 - 10.5 mg/dL Final     Total Protein   Date Value Ref Range Status   10/04/2017 7.5 6.0 - 8.4 g/dL Final     Albumin   Date Value Ref Range Status   10/04/2017 3.4 (L) 3.5 - 5.2 g/dL Final   07/18/2017 3.1 3.1 - 4.7 g/dL      Total Bilirubin   Date Value Ref Range Status   10/04/2017 0.4 0.1 - 1.0 mg/dL Final     Comment:     For infants and newborns, interpretation of results should be based  on gestational age, weight and in agreement with clinical  observations.  Premature Infant recommended reference ranges:  Up to 24 hours.............<8.0 mg/dL  Up to 48 hours............<12.0 mg/dL  3-5 days..................<15.0 mg/dL  6-29 days.................<15.0 mg/dL       Alkaline Phosphatase   Date Value Ref Range Status   10/04/2017 171 (H) 55 - 135 U/L Final     AST   Date Value Ref Range Status   10/04/2017 30 10 - 40 U/L Final     ALT   Date Value Ref Range Status   10/04/2017 11 10 - 44 U/L Final     Anion Gap   Date Value Ref Range Status   10/04/2017 7 (L) 8 - 16 mmol/L Final     eGFR if    Date Value Ref Range Status   10/04/2017 >60 >60 mL/min/1.73 m^2 Final     eGFR if non    Date Value Ref Range Status   10/04/2017 >60 >60 mL/min/1.73 m^2 Final     Comment:     Calculation used to obtain the estimated glomerular filtration  rate (eGFR) is the CKD-EPI equation. Since race is unknown   in our information system, the eGFR values for   -American and Non--American patients are given   for each  creatinine result.           Radiology/Diagnostic Studies:          Assessment/Plan:   (1) 72 y.o. 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  Responding to chemo..  Abraxane gemzar.     (2) Status post Y 90 right hepatic administration on 10/04/2017    (3)S/P cholecystectomy.    RTC 1 month. Further systemic chemotherapy on hold for now.                  Reactions     Palpitations

## 2017-11-16 ENCOUNTER — OFFICE VISIT (OUTPATIENT)
Dept: HEMATOLOGY/ONCOLOGY | Facility: CLINIC | Age: 72
End: 2017-11-16
Payer: MEDICARE

## 2017-11-16 VITALS
WEIGHT: 90 LBS | RESPIRATION RATE: 18 BRPM | DIASTOLIC BLOOD PRESSURE: 74 MMHG | TEMPERATURE: 98 F | BODY MASS INDEX: 17.01 KG/M2 | HEART RATE: 97 BPM | SYSTOLIC BLOOD PRESSURE: 105 MMHG

## 2017-11-16 DIAGNOSIS — C22.1 MALIGNANT NEOPLASM OF INTRAHEPATIC BILE DUCTS: Primary | ICD-10-CM

## 2017-11-16 PROCEDURE — 99213 OFFICE O/P EST LOW 20 MIN: CPT | Mod: ,,, | Performed by: INTERNAL MEDICINE

## 2017-11-16 RX ORDER — DRONABINOL 2.5 MG/1
CAPSULE ORAL
COMMUNITY
Start: 2017-11-09

## 2017-11-16 RX ORDER — AMOXICILLIN 500 MG/1
CAPSULE ORAL
COMMUNITY
Start: 2017-10-18

## 2017-11-16 RX ORDER — LACTULOSE 10 G/15ML
SOLUTION ORAL; RECTAL
COMMUNITY
Start: 2017-11-09

## 2017-11-17 NOTE — PROGRESS NOTES
[facility] HEME/ONC PROGRESS NOTE      Subjective:       Patient ID:   Aurelia Massey  72 y.o. female.  1945      Chief Complaint:  Cancer follow up    History of Present Illness:     Patient returns today for a regularly scheduled follow-up visit.   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    C/O decreased appetite and constipation sx.   No  N/V/ D.  She took Marinol x's 1, stopped it.    Contipation sx, she took chronulac syrup x's 1, had abdomenal cramps, she stopped it.  I do not think she will resume it at decreased dose.    She saw Dr. Luther, cholecystectomy done.    Bx of liver and mapping of liver done. 9/22/17.  Had Y 90 10/4/17. Per Santosh Luther/Johnny.  I have ordered follow up [facility] 12/2017.    ROS:   GEN: normal without any fever, night sweats or weight loss  HEENT: normal with no HA's, sore throat, stiff neck, changes in vision  CV: normal with no CP, SOB, PND, CAMPBELL or orthopnea  PULM: normal with no SOB, cough, hemoptysis, sputum or pleuritic pain  GI: See HPI.  no nausea, vomiting,  diarrhea, melanotic stools, BRBPR, or hematemesis  : normal with no hematuria, dysuria  BREAST: normal with no mass, discharge, pain  SKIN: normal with no rash, erythema, bruising, or swelling    Allergies:  Review of patient's allergies indicates:   Allergen Reactions    Plaquenil [hydroxychloroquine] Palpitations       Medications:    Current Outpatient Prescriptions:     calcium-vitamin D3 (CALCIUM 500 + D) 500 mg(1,250mg) -200 unit per tablet, Take 2 tablets by mouth every other day., Disp: , Rfl:     multivitamin capsule, Take 1 capsule by mouth once daily., Disp: , Rfl:     ondansetron (ZOFRAN) 8 MG tablet, TAKE 1 TABLET BY MOUTH EVERY 8 HOURS AS NEEDED FOR NAUSEA AND VOMITING, Disp: , Rfl: 2    ondansetron (ZOFRAN-ODT) 8 MG TbDL, Take 4 mg by mouth every 6 (six) hours as needed (nausea)., Disp: ,  Rfl:     amoxicillin (AMOXIL) 500 MG capsule, , Disp: , Rfl:     dronabinol (MARINOL) 2.5 MG capsule, , Disp: , Rfl:     hydrocodone-acetaminophen 5-325mg (NORCO) 5-325 mg per tablet, Take 1 tablet by mouth every 6 (six) hours as needed for Pain., Disp: 30 tablet, Rfl: 0    lactulose (CHRONULAC) 10 gram/15 mL solution, , Disp: , Rfl:     magnesium citrate solution, Take 296 mLs by mouth once., Disp: , Rfl:     magnesium hydroxide 400 mg/5 ml (MILK OF MAGNESIA) 400 mg/5 mL Susp, Take 30 mLs by mouth daily as needed., Disp: , Rfl:       Objective:     Vitals:  Blood pressure 105/74, pulse 97, temperature 97.6 °F (36.4 °C), resp. rate 18, weight 40.8 kg (90 lb).    Physical Examination:   GEN: chronically ill in appearance, thin  HEAD: atraumatic and normocephalic  EYES: +  pallor, no icterus  ENT:  no pharyngeal erythema, external ears WNL; no nasal discharge  NECK: no masses, thyroid normal, trachea midline, no LAD/LN's, supple  CV: RRR with no murmur; normal pulse; normal S1 and S2; no pedal edema  CHEST: Normal respiratory effort; CTAB; normal breath sounds; no wheeze or crackles  ABDOM: nontender and nondistended; soft; normal bowel sounds; no rebound/guarding  MUSC/Skeletal: ROM normal; no crepitus; joints normal  EXTREM: no clubbing, cyanosis, inflammation or swelling  SKIN: no rashes, lesions, ulcers, petechiae   : no valenzuela  NEURO: grossly intact; motor/sensory WNL;  no tremors  PSYCH: normal mood, affect and behavior  LYMPH: normal cervical, supraclavicular, axillary and groin LN's      Labs:   Lab Results   Component Value Date    WBC 6.97 09/22/2017    HGB 11.0 (L) 09/22/2017    HCT 33.0 (L) 09/22/2017    MCV 94 09/22/2017     09/22/2017    CMP  Sodium   Date Value Ref Range Status   10/04/2017 132 (L) 136 - 145 mmol/L Final   07/18/2017 129 (L) 134 - 144 mmol/L      Potassium   Date Value Ref Range Status   10/04/2017 4.3 3.5 - 5.1 mmol/L Final     Chloride   Date Value Ref Range Status    10/04/2017 98 95 - 110 mmol/L Final   07/18/2017 98 98 - 110 mmol/L      CO2   Date Value Ref Range Status   10/04/2017 27 23 - 29 mmol/L Final     Glucose   Date Value Ref Range Status   10/04/2017 82 70 - 110 mg/dL Final   07/18/2017 89 70 - 99 mg/dL      BUN, Bld   Date Value Ref Range Status   10/04/2017 10 8 - 23 mg/dL Final     Creatinine   Date Value Ref Range Status   10/04/2017 0.8 0.5 - 1.4 mg/dL Final   07/18/2017 0.52 (L) 0.60 - 1.40 mg/dL      Calcium   Date Value Ref Range Status   10/04/2017 9.4 8.7 - 10.5 mg/dL Final     Total Protein   Date Value Ref Range Status   10/04/2017 7.5 6.0 - 8.4 g/dL Final     Albumin   Date Value Ref Range Status   10/04/2017 3.4 (L) 3.5 - 5.2 g/dL Final   07/18/2017 3.1 3.1 - 4.7 g/dL      Total Bilirubin   Date Value Ref Range Status   10/04/2017 0.4 0.1 - 1.0 mg/dL Final     Comment:     For infants and newborns, interpretation of results should be based  on gestational age, weight and in agreement with clinical  observations.  Premature Infant recommended reference ranges:  Up to 24 hours.............<8.0 mg/dL  Up to 48 hours............<12.0 mg/dL  3-5 days..................<15.0 mg/dL  6-29 days.................<15.0 mg/dL       Alkaline Phosphatase   Date Value Ref Range Status   10/04/2017 171 (H) 55 - 135 U/L Final     AST   Date Value Ref Range Status   10/04/2017 30 10 - 40 U/L Final     ALT   Date Value Ref Range Status   10/04/2017 11 10 - 44 U/L Final     Anion Gap   Date Value Ref Range Status   10/04/2017 7 (L) 8 - 16 mmol/L Final     eGFR if    Date Value Ref Range Status   10/04/2017 >60 >60 mL/min/1.73 m^2 Final     eGFR if non    Date Value Ref Range Status   10/04/2017 >60 >60 mL/min/1.73 m^2 Final     Comment:     Calculation used to obtain the estimated glomerular filtration  rate (eGFR) is the CKD-EPI equation. Since race is unknown   in our information system, the eGFR values for   -American and  Non--American patients are given   for each creatinine result.           Radiology/Diagnostic Studies:    CAT ordered for 12/2017      Assessment/Plan:   (1) 72 y.o. 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  Responding to chemo..  Abraxane gemzar.     (2) Had  Y 90 10/4/17.  For scan 12/2017.    (3)S/P cholecystectomy.    Loss of appetite, weight, constipation sx.  Tolerated  Marinol 2.5 mg 1/day and  Chronulac syrup BID poorly.    RTC 3 week after CAT scan.                  Reactions     Palpitations

## 2017-11-21 ENCOUNTER — HISTORICAL (OUTPATIENT)
Dept: ADMINISTRATIVE | Facility: HOSPITAL | Age: 72
End: 2017-11-21

## 2017-11-21 LAB
BASOPHILS NFR BLD: 0.1 K/UL (ref 0–0.2)
BASOPHILS NFR BLD: 1.1 %
EOSINOPHIL NFR BLD: 0.1 K/UL (ref 0–0.7)
EOSINOPHIL NFR BLD: 1.1 %
ERYTHROCYTE [DISTWIDTH] IN BLOOD BY AUTOMATED COUNT: 12.6 % (ref 12.5–14.5)
GRAN #: 3.8 K/UL (ref 1.4–6.5)
GRAN%: 68.2 %
HCT VFR BLD AUTO: 33.2 % (ref 36–48)
HGB BLD-MCNC: 11.5 G/DL (ref 12–15)
IMMATURE GRANS (ABS): 0 K/UL (ref 0–1)
IMMATURE GRANULOCYTES: 0.2 %
LYMPH #: 1.1 K/UL (ref 1.2–3.4)
LYMPH%: 20.1 %
MCH RBC QN AUTO: 30.6 PG (ref 25–35)
MCHC RBC AUTO-ENTMCNC: 34.6 G/DL (ref 31–36)
MCV RBC AUTO: 88.3 FL (ref 79–98)
MONO #: 0.5 K/UL (ref 0.1–0.6)
MONO%: 9.3 %
NUCLEATED RBCS: 0 %
NUCLEATED RED BLOOD CELLS: 0 /100 WBC
PERFORMED BY:: ABNORMAL
PLATELET # BLD AUTO: 513 K/UL (ref 140–440)
PMV BLD AUTO: 8.7 FL (ref 8.8–12.7)
RBC # BLD AUTO: 3.76 M/UL (ref 3.5–5.5)
WBC # BLD: 5.6 K/UL (ref 5–10)

## 2017-11-21 RX ORDER — SODIUM CHLORIDE 0.9 % (FLUSH) 0.9 %
10 SYRINGE (ML) INJECTION
Status: CANCELLED | OUTPATIENT
Start: 2017-11-21

## 2017-11-21 RX ORDER — HEPARIN 100 UNIT/ML
500 SYRINGE INTRAVENOUS
Status: CANCELLED | OUTPATIENT
Start: 2017-11-21

## 2017-12-04 ENCOUNTER — TELEPHONE (OUTPATIENT)
Dept: HEMATOLOGY/ONCOLOGY | Facility: CLINIC | Age: 72
End: 2017-12-04

## 2017-12-04 NOTE — TELEPHONE ENCOUNTER
Please tell Mrs. Massey that her CAT scan looked is much improved. Nodules of cancer in the liver appear to be dying all and getting smaller. When is her return to clinic to see me?

## 2017-12-04 NOTE — TELEPHONE ENCOUNTER
Notified pt CT scan looks much improved. Nodules in liver look smaller and appear to be dying. VU. Next appt is 12/13.

## 2017-12-06 ENCOUNTER — TELEPHONE (OUTPATIENT)
Dept: HEMATOLOGY/ONCOLOGY | Facility: CLINIC | Age: 72
End: 2017-12-06

## 2017-12-06 NOTE — TELEPHONE ENCOUNTER
Spoke with pt. Instructed her that Dr. Whitehead was out of town at a conference and could not prescribe this until OK'd by him. Suggested she call her PMD to see if he would or could try her pain meds  He had prescribed. She is complaining of a knot in her neck that wont relax.

## 2017-12-06 NOTE — TELEPHONE ENCOUNTER
----- Message from Clarita Self sent at 12/5/2017  2:05 PM CST -----  Contact: CALL BACK # [phone]  PT called in asked if dr lopes would call in a muscle relaxer to [facility] in [place].   Pt had knots in her shoulders that she cant work out. Call back # above

## 2017-12-13 ENCOUNTER — OFFICE VISIT (OUTPATIENT)
Dept: HEMATOLOGY/ONCOLOGY | Facility: CLINIC | Age: 72
End: 2017-12-13
Payer: MEDICARE

## 2017-12-13 VITALS
DIASTOLIC BLOOD PRESSURE: 67 MMHG | TEMPERATURE: 97 F | BODY MASS INDEX: 17.57 KG/M2 | RESPIRATION RATE: 18 BRPM | SYSTOLIC BLOOD PRESSURE: 97 MMHG | HEART RATE: 88 BPM | WEIGHT: 93 LBS

## 2017-12-13 DIAGNOSIS — C78.7 METASTATIC CANCER TO LIVER: Primary | ICD-10-CM

## 2017-12-13 DIAGNOSIS — C22.1 CHOLANGIOCARCINOMA METASTATIC TO LIVER: ICD-10-CM

## 2017-12-13 DIAGNOSIS — C78.7 CHOLANGIOCARCINOMA METASTATIC TO LIVER: ICD-10-CM

## 2017-12-13 PROCEDURE — 99214 OFFICE O/P EST MOD 30 MIN: CPT | Mod: ,,, | Performed by: INTERNAL MEDICINE

## 2018-01-04 RX ORDER — SODIUM CHLORIDE 0.9 % (FLUSH) 0.9 %
10 SYRINGE (ML) INJECTION
Status: CANCELLED | OUTPATIENT
Start: 2018-01-04

## 2018-01-04 RX ORDER — HEPARIN 100 UNIT/ML
500 SYRINGE INTRAVENOUS
Status: CANCELLED | OUTPATIENT
Start: 2018-01-04

## 2018-02-01 ENCOUNTER — OFFICE VISIT (OUTPATIENT)
Dept: HEMATOLOGY/ONCOLOGY | Facility: CLINIC | Age: 73
End: 2018-02-01
Payer: MEDICARE

## 2018-02-01 VITALS
RESPIRATION RATE: 18 BRPM | HEART RATE: 84 BPM | WEIGHT: 100 LBS | BODY MASS INDEX: 18.89 KG/M2 | TEMPERATURE: 97 F | DIASTOLIC BLOOD PRESSURE: 74 MMHG | SYSTOLIC BLOOD PRESSURE: 110 MMHG

## 2018-02-01 DIAGNOSIS — C22.1 CHOLANGIOCARCINOMA METASTATIC TO LIVER: Primary | ICD-10-CM

## 2018-02-01 DIAGNOSIS — C78.7 SECONDARY MALIGNANT NEOPLASM OF LIVER AND INTRAHEPATIC BILE DUCT: ICD-10-CM

## 2018-02-01 DIAGNOSIS — C78.7 CHOLANGIOCARCINOMA METASTATIC TO LIVER: Primary | ICD-10-CM

## 2018-02-01 PROCEDURE — 99214 OFFICE O/P EST MOD 30 MIN: CPT | Mod: ,,, | Performed by: INTERNAL MEDICINE

## 2018-02-01 PROCEDURE — 3008F BODY MASS INDEX DOCD: CPT | Mod: ,,, | Performed by: INTERNAL MEDICINE

## 2018-02-01 PROCEDURE — 1159F MED LIST DOCD IN RCRD: CPT | Mod: ,,, | Performed by: INTERNAL MEDICINE

## 2018-02-01 RX ORDER — CARISOPRODOL 350 MG/1
TABLET ORAL
COMMUNITY
Start: 2017-12-13

## 2018-02-01 RX ORDER — ERGOCALCIFEROL 1.25 MG/1
50000 CAPSULE ORAL
COMMUNITY

## 2018-02-01 NOTE — PROGRESS NOTES
[facility] HEME/ONC PROGRESS NOTE      Subjective:       Patient ID:   Aurelia Massey  72 y.o. female.  1945      Chief Complaint:  Cancer follow up    History of Present Illness:     Patient returns today for a regularly scheduled follow-up visit.   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    C/O decreased appetite and constipation sx.   No  N/V/ D.  She took Marinol x's 1, stopped it.    Contipation sx, she took chronulac syrup x's 1, had abdomenal cramps, she stopped it.  I do not think she will resume it at decreased dose.    She saw Dr. Luther, cholecystectomy done.    Bx of liver and mapping of liver done. 9/22/17.  Had Y 90 10/4/17. Per Santosh Luther/Johnny.    CAT 12/4/17 decreased mass effect, tumor necrosis.    Stronger, energy better. Appetite better.  Wt 100# increased.    ROS:   GEN: normal without any fever, night sweats or weight loss  HEENT: normal with no HA's, sore throat, stiff neck, changes in vision  CV: normal with no CP, SOB, PND, CAMPBELL or orthopnea  PULM: normal with no SOB, cough, hemoptysis, sputum or pleuritic pain  GI: See HPI.  no nausea, vomiting,  diarrhea, melanotic stools, BRBPR, or hematemesis  : normal with no hematuria, dysuria  BREAST: normal with no mass, discharge, pain  SKIN: normal with no rash, erythema, bruising, or swelling    Allergies:  Review of patient's allergies indicates:   Allergen Reactions    Plaquenil [hydroxychloroquine] Palpitations       Medications:    Current Outpatient Prescriptions:     carisoprodol (SOMA) 350 MG tablet, , Disp: , Rfl:     ergocalciferol (VITAMIN D2) 50,000 unit Cap, Take 50,000 Units by mouth every 7 days., Disp: , Rfl:     hydrocodone-acetaminophen 5-325mg (NORCO) 5-325 mg per tablet, Take 1 tablet by mouth every 6 (six) hours as needed for Pain., Disp: 30 tablet, Rfl: 0    lactulose (CHRONULAC) 10 gram/15 mL solution, , Disp: , Rfl:      multivitamin capsule, Take 1 capsule by mouth once daily., Disp: , Rfl:     amoxicillin (AMOXIL) 500 MG capsule, , Disp: , Rfl:     calcium-vitamin D3 (CALCIUM 500 + D) 500 mg(1,250mg) -200 unit per tablet, Take 2 tablets by mouth every other day., Disp: , Rfl:     dronabinol (MARINOL) 2.5 MG capsule, , Disp: , Rfl:     magnesium citrate solution, Take 296 mLs by mouth once., Disp: , Rfl:     magnesium hydroxide 400 mg/5 ml (MILK OF MAGNESIA) 400 mg/5 mL Susp, Take 30 mLs by mouth daily as needed., Disp: , Rfl:     ondansetron (ZOFRAN) 8 MG tablet, TAKE 1 TABLET BY MOUTH EVERY 8 HOURS AS NEEDED FOR NAUSEA AND VOMITING, Disp: , Rfl: 2    ondansetron (ZOFRAN-ODT) 8 MG TbDL, Take 4 mg by mouth every 6 (six) hours as needed (nausea)., Disp: , Rfl:       Objective:     Vitals:  Blood pressure 110/74, pulse 84, temperature 97 °F (36.1 °C), resp. rate 18, weight 45.4 kg (100 lb).    Physical Examination:   GEN: chronically ill in appearance, thin  HEAD: atraumatic and normocephalic  EYES: +  pallor, no icterus  ENT:  no pharyngeal erythema, external ears WNL; no nasal discharge  NECK: no masses, thyroid normal, trachea midline, no LAD/LN's, supple  CV: RRR with no murmur; normal pulse; normal S1 and S2; no pedal edema  CHEST: Normal respiratory effort; CTAB; normal breath sounds; no wheeze or crackles  ABDOM: nontender and nondistended; soft; normal bowel sounds; no rebound/guarding  MUSC/Skeletal: ROM normal; no crepitus; joints normal  EXTREM: no clubbing, cyanosis, inflammation or swelling  SKIN: no rashes, lesions, ulcers, petechiae   : no valenzuela  NEURO: grossly intact; motor/sensory WNL;  no tremors  PSYCH: normal mood, affect and behavior  LYMPH: normal cervical, supraclavicular, axillary and groin LN's      Labs:   Lab Results   Component Value Date    WBC 5.6 11/21/2017    HGB 11.5 (L) 11/21/2017    HCT 33.2 (L) 11/21/2017    MCV 94 09/22/2017     (H) 11/21/2017    CMP  Sodium   Date Value Ref Range  Status   11/21/2017 131 (L) 134 - 144 mmol/L      Potassium   Date Value Ref Range Status   11/21/2017 3.6 3.5 - 5.0 mmol/L      Chloride   Date Value Ref Range Status   11/21/2017 98 98 - 110 mmol/L      CO2   Date Value Ref Range Status   11/21/2017 26.5 22.8 - 31.6 mmol/L      Glucose   Date Value Ref Range Status   11/21/2017 90 70 - 99 mg/dL      BUN, Bld   Date Value Ref Range Status   11/21/2017 8 8 - 20 mg/dL      Creatinine   Date Value Ref Range Status   11/21/2017 0.62 0.60 - 1.40 mg/dL      Calcium   Date Value Ref Range Status   11/21/2017 8.7 7.7 - 10.4 mg/dL      Total Protein   Date Value Ref Range Status   11/21/2017 6.9 6.0 - 8.2 g/dL      Albumin   Date Value Ref Range Status   11/21/2017 3.0 (L) 3.1 - 4.7 g/dL      Total Bilirubin   Date Value Ref Range Status   11/21/2017 0.3 0.3 - 1.0 mg/dL      Alkaline Phosphatase   Date Value Ref Range Status   11/21/2017 101 40 - 104 IU/L      AST   Date Value Ref Range Status   11/21/2017 23 10 - 40 IU/L      ALT   Date Value Ref Range Status   10/04/2017 11 10 - 44 U/L Final     Anion Gap   Date Value Ref Range Status   10/04/2017 7 (L) 8 - 16 mmol/L Final     eGFR if    Date Value Ref Range Status   10/04/2017 >60 >60 mL/min/1.73 m^2 Final     eGFR if non    Date Value Ref Range Status   10/04/2017 >60 >60 mL/min/1.73 m^2 Final     Comment:     Calculation used to obtain the estimated glomerular filtration  rate (eGFR) is the CKD-EPI equation. Since race is unknown   in our information system, the eGFR values for   -American and Non--American patients are given   for each creatinine result.         Assessment/Plan:   (1) 72 y.o. 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  Responding to chemo..  Abraxane gemzar.     (2) Had  Y 90 10/4/17.  CAT scan 12/2017 better with necrotic mass, 6 cm at liver.    (3)S/P cholecystectomy.    Discussed result  with Santosh Luther  regards Y90 retreatment to liver vs observation for now.  He recommended to repeat the tumor markers and the cat of abd/ pelvis in 1 months.  We will decide on observation vs Y90 vs systemic chemo, after that CAT is reviewed.    RTC 1 month.                  Reactions     Palpitations

## 2018-03-01 LAB — ISTAT CREATININE: 0.7 MG/DL (ref 0.6–1.4)

## 2018-03-05 ENCOUNTER — HISTORICAL (OUTPATIENT)
Dept: ADMINISTRATIVE | Facility: HOSPITAL | Age: 73
End: 2018-03-05

## 2018-03-05 LAB
ALBUMIN SERPL-MCNC: 3.7 G/DL (ref 3.1–4.7)
ALP SERPL-CCNC: 130 IU/L (ref 40–104)
ALT (SGPT): 13 IU/L (ref 3–33)
AST SERPL-CCNC: 29 IU/L (ref 10–40)
BASOPHILS NFR BLD: 0.1 K/UL (ref 0–0.2)
BASOPHILS NFR BLD: 1.6 %
BILIRUB SERPL-MCNC: 0.8 MG/DL (ref 0.3–1)
BUN SERPL-MCNC: 7 MG/DL (ref 8–20)
CALCIUM SERPL-MCNC: 9.1 MG/DL (ref 7.7–10.4)
CHLORIDE: 95 MMOL/L (ref 98–110)
CO2 SERPL-SCNC: 24.4 MMOL/L (ref 22.8–31.6)
CREATININE: 0.76 MG/DL (ref 0.6–1.4)
EOSINOPHIL NFR BLD: 0.2 K/UL (ref 0–0.7)
EOSINOPHIL NFR BLD: 3.5 %
ERYTHROCYTE [DISTWIDTH] IN BLOOD BY AUTOMATED COUNT: 13.2 % (ref 12.5–14.5)
GLUCOSE: 80 MG/DL (ref 70–99)
GRAN #: 2.5 K/UL (ref 1.4–6.5)
GRAN%: 49.2 %
HCT VFR BLD AUTO: 38.9 % (ref 36–48)
HGB BLD-MCNC: 13.2 G/DL (ref 12–15)
IMMATURE GRANS (ABS): 0 K/UL (ref 0–1)
IMMATURE GRANULOCYTES: 0.4 %
LYMPH #: 1.9 K/UL (ref 1.2–3.4)
LYMPH%: 36.6 %
MCH RBC QN AUTO: 31.1 PG (ref 25–35)
MCHC RBC AUTO-ENTMCNC: 33.9 G/DL (ref 31–36)
MCV RBC AUTO: 91.5 FL (ref 79–98)
MONO #: 0.4 K/UL (ref 0.1–0.6)
MONO%: 8.7 %
NUCLEATED RBCS: 0 %
NUCLEATED RED BLOOD CELLS: 0 /100 WBC
PERFORMED BY:: NORMAL
PLATELET # BLD AUTO: 333 K/UL (ref 140–440)
PMV BLD AUTO: 8.8 FL (ref 8.8–12.7)
POTASSIUM SERPL-SCNC: 3.9 MMOL/L (ref 3.5–5)
PROT SERPL-MCNC: 7.2 G/DL (ref 6–8.2)
RBC # BLD AUTO: 4.25 M/UL (ref 3.5–5.5)
SODIUM: 127 MMOL/L (ref 134–144)
WBC # BLD: 5.1 K/UL (ref 5–10)

## 2018-03-06 LAB — CANCER AG19-9 SERPL-ACNC: 12 U/ML (ref 0–35)

## 2018-03-08 ENCOUNTER — OFFICE VISIT (OUTPATIENT)
Dept: HEMATOLOGY/ONCOLOGY | Facility: CLINIC | Age: 73
End: 2018-03-08
Payer: MEDICARE

## 2018-03-08 VITALS
TEMPERATURE: 98 F | DIASTOLIC BLOOD PRESSURE: 61 MMHG | SYSTOLIC BLOOD PRESSURE: 102 MMHG | WEIGHT: 101 LBS | BODY MASS INDEX: 19.08 KG/M2 | RESPIRATION RATE: 16 BRPM | HEART RATE: 72 BPM

## 2018-03-08 DIAGNOSIS — C78.7 CHOLANGIOCARCINOMA METASTATIC TO LIVER: Primary | ICD-10-CM

## 2018-03-08 DIAGNOSIS — C78.7 METASTATIC CANCER TO LIVER: ICD-10-CM

## 2018-03-08 DIAGNOSIS — C22.1 CHOLANGIOCARCINOMA METASTATIC TO LIVER: Primary | ICD-10-CM

## 2018-03-08 PROCEDURE — 99213 OFFICE O/P EST LOW 20 MIN: CPT | Mod: ,,, | Performed by: INTERNAL MEDICINE

## 2018-03-08 NOTE — PROGRESS NOTES
[facility] HEME/ONC PROGRESS NOTE      Subjective:       Patient ID:   Aurelia Massey  72 y.o. female.  1945      Chief Complaint:  Cancer follow up    History of Present Illness:     Patient returns today for a regularly scheduled follow-up visit.   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     Appetite and constipation sx better.   No  N/V/ D.  She took Marinol x's 1, stopped it.    Contipation sx, she took chronulac syrup x's 1, had abdomenal cramps, she stopped it.  I do not think she will resume it at decreased dose.  Intermittent hiccups, she does not want thorazine.    She saw Dr. Luther, cholecystectomy done.    Bx of liver and mapping of liver done. 9/22/17.  Had Y 90 10/4/17. Per Santosh Luther/Johnny.    CAT 12/4/17 decreased mass effect, tumor necrosis.  CAT 3/2018 better, masses no change in mass sizes, but have necrosis.    Stronger, energy better. Appetite better.  Wt 102# increased.    ROS:   GEN: normal without any fever, night sweats or weight loss  HEENT: normal with no HA's, sore throat, stiff neck, changes in vision  CV: normal with no CP, SOB, PND, CAMPBELL or orthopnea  PULM: normal with no SOB, cough, hemoptysis, sputum or pleuritic pain  GI: See HPI.  no nausea, vomiting,  diarrhea, melanotic stools, BRBPR, or hematemesis  : normal with no hematuria, dysuria  BREAST: normal with no mass, discharge, pain  SKIN: normal with no rash, erythema, bruising, or swelling    Allergies:  Review of patient's allergies indicates:   Allergen Reactions    Plaquenil [hydroxychloroquine] Palpitations       Medications:    Current Outpatient Prescriptions:     amoxicillin (AMOXIL) 500 MG capsule, , Disp: , Rfl:     calcium-vitamin D3 (CALCIUM 500 + D) 500 mg(1,250mg) -200 unit per tablet, Take 2 tablets by mouth every other day., Disp: , Rfl:     carisoprodol (SOMA) 350 MG tablet, , Disp: , Rfl:     dronabinol  (MARINOL) 2.5 MG capsule, , Disp: , Rfl:     ergocalciferol (VITAMIN D2) 50,000 unit Cap, Take 50,000 Units by mouth every 7 days., Disp: , Rfl:     hydrocodone-acetaminophen 5-325mg (NORCO) 5-325 mg per tablet, Take 1 tablet by mouth every 6 (six) hours as needed for Pain., Disp: 30 tablet, Rfl: 0    lactulose (CHRONULAC) 10 gram/15 mL solution, , Disp: , Rfl:     magnesium citrate solution, Take 296 mLs by mouth once., Disp: , Rfl:     magnesium hydroxide 400 mg/5 ml (MILK OF MAGNESIA) 400 mg/5 mL Susp, Take 30 mLs by mouth daily as needed., Disp: , Rfl:     multivitamin capsule, Take 1 capsule by mouth once daily., Disp: , Rfl:     ondansetron (ZOFRAN) 8 MG tablet, TAKE 1 TABLET BY MOUTH EVERY 8 HOURS AS NEEDED FOR NAUSEA AND VOMITING, Disp: , Rfl: 2    ondansetron (ZOFRAN-ODT) 8 MG TbDL, Take 4 mg by mouth every 6 (six) hours as needed (nausea)., Disp: , Rfl:       Objective:     Vitals:  There were no vitals taken for this visit.    Physical Examination:   GEN: chronically ill in appearance, thin  HEAD: atraumatic and normocephalic  EYES: +  pallor, no icterus  ENT:  no pharyngeal erythema, external ears WNL; no nasal discharge  NECK: no masses, thyroid normal, trachea midline, no LAD/LN's, supple  CV: RRR with no murmur; normal pulse; normal S1 and S2; no pedal edema  CHEST: Normal respiratory effort; CTAB; normal breath sounds; no wheeze or crackles  ABDOM: nontender and nondistended; soft; normal bowel sounds; no rebound/guarding  MUSC/Skeletal: ROM normal; no crepitus; joints normal  EXTREM: no clubbing, cyanosis, inflammation or swelling  SKIN: no rashes, lesions, ulcers, petechiae   : no valenzuela  NEURO: grossly intact; motor/sensory WNL;  no tremors  PSYCH: normal mood, affect and behavior  LYMPH: normal cervical, supraclavicular, axillary and groin LN's      Labs:   Lab Results   Component Value Date    WBC 5.1 03/05/2018    HGB 13.2 03/05/2018    HCT 38.9 03/05/2018    MCV 94 09/22/2017    PLT  333 03/05/2018    CMP  Sodium   Date Value Ref Range Status   03/05/2018 127 (L) 134 - 144 mmol/L      Potassium   Date Value Ref Range Status   03/05/2018 3.9 3.5 - 5.0 mmol/L      Chloride   Date Value Ref Range Status   03/05/2018 95 (L) 98 - 110 mmol/L      CO2   Date Value Ref Range Status   03/05/2018 24.4 22.8 - 31.6 mmol/L      Glucose   Date Value Ref Range Status   03/05/2018 80 70 - 99 mg/dL      BUN, Bld   Date Value Ref Range Status   03/05/2018 7 (L) 8 - 20 mg/dL      Creatinine   Date Value Ref Range Status   03/05/2018 0.76 0.60 - 1.40 mg/dL      Calcium   Date Value Ref Range Status   03/05/2018 9.1 7.7 - 10.4 mg/dL      Total Protein   Date Value Ref Range Status   03/05/2018 7.2 6.0 - 8.2 g/dL      Albumin   Date Value Ref Range Status   03/05/2018 3.7 3.1 - 4.7 g/dL      Total Bilirubin   Date Value Ref Range Status   03/05/2018 0.8 0.3 - 1.0 mg/dL      Alkaline Phosphatase   Date Value Ref Range Status   03/05/2018 130 (H) 40 - 104 IU/L      AST   Date Value Ref Range Status   03/05/2018 29 10 - 40 IU/L      ALT   Date Value Ref Range Status   10/04/2017 11 10 - 44 U/L Final     Anion Gap   Date Value Ref Range Status   10/04/2017 7 (L) 8 - 16 mmol/L Final     eGFR if    Date Value Ref Range Status   10/04/2017 >60 >60 mL/min/1.73 m^2 Final     eGFR if non    Date Value Ref Range Status   10/04/2017 >60 >60 mL/min/1.73 m^2 Final     Comment:     Calculation used to obtain the estimated glomerular filtration  rate (eGFR) is the CKD-EPI equation. Since race is unknown   in our information system, the eGFR values for   -American and Non--American patients are given   for each creatinine result.         Assessment/Plan:   (1) 72 y.o. 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  Responding to chemo..  Abraxane gemzar.     (2) Had  Y 90 10/4/17.  CAT scan 12/2017 better with necrotic mass, 6  cm at liver.  Repeat CAT now is better, mass sizes no change, but with necrosis.  CA 19-9 12.  (3)S/P cholecystectomy.    RTC 2 month.                  Reactions

## 2018-03-27 RX ORDER — SODIUM CHLORIDE 0.9 % (FLUSH) 0.9 %
10 SYRINGE (ML) INJECTION
Status: CANCELLED | OUTPATIENT
Start: 2018-03-27

## 2018-03-27 RX ORDER — HEPARIN 100 UNIT/ML
500 SYRINGE INTRAVENOUS
Status: CANCELLED | OUTPATIENT
Start: 2018-03-27

## 2018-05-08 RX ORDER — SODIUM CHLORIDE 0.9 % (FLUSH) 0.9 %
10 SYRINGE (ML) INJECTION
Status: CANCELLED | OUTPATIENT
Start: 2018-05-08

## 2018-05-08 RX ORDER — HEPARIN 100 UNIT/ML
500 SYRINGE INTRAVENOUS
Status: CANCELLED | OUTPATIENT
Start: 2018-05-08

## 2018-05-10 ENCOUNTER — OFFICE VISIT (OUTPATIENT)
Dept: HEMATOLOGY/ONCOLOGY | Facility: CLINIC | Age: 73
End: 2018-05-10
Payer: MEDICARE

## 2018-05-10 VITALS
WEIGHT: 104 LBS | SYSTOLIC BLOOD PRESSURE: 111 MMHG | BODY MASS INDEX: 19.65 KG/M2 | TEMPERATURE: 97 F | DIASTOLIC BLOOD PRESSURE: 64 MMHG | RESPIRATION RATE: 18 BRPM | HEART RATE: 61 BPM

## 2018-05-10 DIAGNOSIS — C22.1 CHOLANGIOCARCINOMA METASTATIC TO LIVER: Primary | ICD-10-CM

## 2018-05-10 DIAGNOSIS — C78.7 CHOLANGIOCARCINOMA METASTATIC TO LIVER: Primary | ICD-10-CM

## 2018-05-10 PROCEDURE — 99214 OFFICE O/P EST MOD 30 MIN: CPT | Mod: ,,, | Performed by: INTERNAL MEDICINE

## 2018-05-10 NOTE — PROGRESS NOTES
[facility] HEME/ONC PROGRESS NOTE      Subjective:       Patient ID:   Aurelia Massey  72 y.o. female.  1945      Chief Complaint:  Cancer follow up    History of Present Illness:     Patient returns today for a regularly scheduled follow-up visit.   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     Appetite and constipation sx better.   No  N/V/ D.  She took Marinol x's 1, stopped it.    Contipation sx, she took chronulac syrup x's 1, had abdomenal cramps, she stopped it.  I do not think she will resume it at decreased dose.  Intermittent hiccups, she does not want thorazine.    She saw Dr. Luther, cholecystectomy done.    Bx of liver and mapping of liver done. 9/22/17.  Had Y 90 10/4/17. Per Santosh Luther/Johnny.    CAT 12/4/17 decreased mass effect, tumor necrosis.  CAT 3/2018 better, masses no change in mass sizes, but have necrosis.  Re check CAT 9/1/18.    Stronger, energy better. Appetite better.  Wt 102# increased.    ROS:   GEN: normal without any fever, night sweats or weight loss  HEENT: normal with no HA's, sore throat, stiff neck, changes in vision  CV: normal with no CP, SOB, PND, CAMPBELL or orthopnea  PULM: normal with no SOB, cough, hemoptysis, sputum or pleuritic pain  GI: See HPI.  no nausea, vomiting,  diarrhea, melanotic stools, BRBPR, or hematemesis  : normal with no hematuria, dysuria  BREAST: normal with no mass, discharge, pain  SKIN: normal with no rash, erythema, bruising, or swelling    Allergies:  Review of patient's allergies indicates:   Allergen Reactions    Plaquenil [hydroxychloroquine] Palpitations       Medications:    Current Outpatient Prescriptions:     calcium-vitamin D3 (CALCIUM 500 + D) 500 mg(1,250mg) -200 unit per tablet, Take 2 tablets by mouth every other day., Disp: , Rfl:     carisoprodol (SOMA) 350 MG tablet, , Disp: , Rfl:     dronabinol (MARINOL) 2.5 MG capsule, , Disp: , Rfl:      ergocalciferol (VITAMIN D2) 50,000 unit Cap, Take 50,000 Units by mouth every 7 days., Disp: , Rfl:     hydrocodone-acetaminophen 5-325mg (NORCO) 5-325 mg per tablet, Take 1 tablet by mouth every 6 (six) hours as needed for Pain., Disp: 30 tablet, Rfl: 0    lactulose (CHRONULAC) 10 gram/15 mL solution, , Disp: , Rfl:     magnesium citrate solution, Take 296 mLs by mouth once., Disp: , Rfl:     magnesium hydroxide 400 mg/5 ml (MILK OF MAGNESIA) 400 mg/5 mL Susp, Take 30 mLs by mouth daily as needed., Disp: , Rfl:     multivitamin capsule, Take 1 capsule by mouth once daily., Disp: , Rfl:     ondansetron (ZOFRAN) 8 MG tablet, TAKE 1 TABLET BY MOUTH EVERY 8 HOURS AS NEEDED FOR NAUSEA AND VOMITING, Disp: , Rfl: 2    ondansetron (ZOFRAN-ODT) 8 MG TbDL, Take 4 mg by mouth every 6 (six) hours as needed (nausea)., Disp: , Rfl:     amoxicillin (AMOXIL) 500 MG capsule, , Disp: , Rfl:       Objective:     Vitals:  There were no vitals taken for this visit.    Physical Examination:   GEN: chronically ill in appearance, thin  HEAD: atraumatic and normocephalic  EYES: +  pallor, no icterus  ENT:  no pharyngeal erythema, external ears WNL; no nasal discharge  NECK: no masses, thyroid normal, trachea midline, no LAD/LN's, supple  CV: RRR with no murmur; normal pulse; normal S1 and S2; no pedal edema  CHEST: Normal respiratory effort; CTAB; normal breath sounds; no wheeze or crackles  ABDOM: nontender and nondistended; soft; normal bowel sounds; no rebound/guarding  MUSC/Skeletal: ROM normal; no crepitus; joints normal  EXTREM: no clubbing, cyanosis, inflammation or swelling  SKIN: no rashes, lesions, ulcers, petechiae   : no valenzuela  NEURO: grossly intact; motor/sensory WNL;  no tremors  PSYCH: normal mood, affect and behavior  LYMPH: normal cervical, supraclavicular, axillary and groin LN's      Labs:   Lab Results   Component Value Date    WBC 5.1 03/05/2018    HGB 13.2 03/05/2018    HCT 38.9 03/05/2018    MCV 94  09/22/2017     03/05/2018    CMP  Sodium   Date Value Ref Range Status   03/05/2018 127 (L) 134 - 144 mmol/L      Potassium   Date Value Ref Range Status   03/05/2018 3.9 3.5 - 5.0 mmol/L      Chloride   Date Value Ref Range Status   03/05/2018 95 (L) 98 - 110 mmol/L      CO2   Date Value Ref Range Status   03/05/2018 24.4 22.8 - 31.6 mmol/L      Glucose   Date Value Ref Range Status   03/05/2018 80 70 - 99 mg/dL      BUN, Bld   Date Value Ref Range Status   03/05/2018 7 (L) 8 - 20 mg/dL      Creatinine   Date Value Ref Range Status   03/05/2018 0.76 0.60 - 1.40 mg/dL      Calcium   Date Value Ref Range Status   03/05/2018 9.1 7.7 - 10.4 mg/dL      Total Protein   Date Value Ref Range Status   03/05/2018 7.2 6.0 - 8.2 g/dL      Albumin   Date Value Ref Range Status   03/05/2018 3.7 3.1 - 4.7 g/dL      Total Bilirubin   Date Value Ref Range Status   03/05/2018 0.8 0.3 - 1.0 mg/dL      Alkaline Phosphatase   Date Value Ref Range Status   03/05/2018 130 (H) 40 - 104 IU/L      AST   Date Value Ref Range Status   03/05/2018 29 10 - 40 IU/L      ALT   Date Value Ref Range Status   10/04/2017 11 10 - 44 U/L Final     Anion Gap   Date Value Ref Range Status   10/04/2017 7 (L) 8 - 16 mmol/L Final     eGFR if    Date Value Ref Range Status   10/04/2017 >60 >60 mL/min/1.73 m^2 Final     eGFR if non    Date Value Ref Range Status   10/04/2017 >60 >60 mL/min/1.73 m^2 Final     Comment:     Calculation used to obtain the estimated glomerular filtration  rate (eGFR) is the CKD-EPI equation. Since race is unknown   in our information system, the eGFR values for   -American and Non--American patients are given   for each creatinine result.         Assessment/Plan:   (1) 72 y.o. 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  Responding to chemo..  Abraxane gemzar.     (2) Had  Y 90 10/4/17.  CAT scan 12/2017 better with  necrotic mass, 6 cm at liver.  Repeat CAT now is better, mass sizes no change, but with necrosis.  CA 19-9 12.  (3)S/P cholecystectomy.    RTC  Month.  Check CAT 9/1/18.                  Reactions

## 2018-05-10 NOTE — LETTER
May 13, 2018      Avinash Rogers MD  [address]  [address]  [facility]  [place] [address]           [facility]  [address]  [place] [address]  Phone: [phone]  Fax: [phone]          Patient: Aurelia Massey   MR Number: [id number]   YOB: 1945   Date of Visit: 5/10/2018       Dear Dr. Avinash Rogers:    Thank you for referring Aurelia Massey to me for evaluation. Attached you will find relevant portions of my assessment and plan of care.    If you have questions, please do not hesitate to call me. I look forward to following Aurelia Massey along with you.    Sincerely,    SADIA Whitehead MD    Enclosure  CC:  No Recipients    If you would like to receive this communication electronically, please contact [email or web address] or [phone] to request more information on [employer] Link access.    For providers and/or their staff who would like to refer a patient to [facility], please contact us through our one-stop-shop provider referral line, [facility], at [phone].    If you feel you have received this communication in error or would no longer like to receive these types of communications, please e-mail [email or web address]

## 2018-06-19 NOTE — PROGRESS NOTES
[facility] HEME/ONC PROGRESS NOTE      Subjective:       Patient ID:   Aurelia Massey  72 y.o. female.  1945      Chief Complaint:  Cancer follow up    History of Present Illness:     Patient called for appt.  She had temp 100.2., resolved.  No chills.  She has noticed jaundice, constipation, dark urine, light stools recently.  Some RUQ pain also.  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    She took Marinol x's 1, stopped it.    Contipation sx, she took chronulac syrup x's 1, had abdomenal cramps, she stopped it.     She saw Dr. Luther, cholecystectomy done.    Bx of liver and mapping of liver done. 9/22/17.  Had Y 90 10/4/17. Per Santosh Luther/Johnny.    CAT 12/4/17 decreased mass effect, tumor necrosis.  CAT 3/2018 better, masses no change in mass sizes, but have necrosis.  Re check CBC, CMP, CAT scan now.    ROS:   GEN: normal without any fever, night sweats or weight loss  HEENT: normal with no HA's, sore throat, stiff neck, changes in vision  CV: normal with no CP, SOB, PND, CAMPBELL or orthopnea  PULM: normal with no SOB, cough, hemoptysis, sputum or pleuritic pain  GI: See HPI.  no nausea, vomiting,  diarrhea, melanotic stools, BRBPR, or hematemesis  : normal with no hematuria, dysuria  BREAST: normal with no mass, discharge, pain  SKIN: normal with no rash, erythema, bruising, or swelling    Allergies:  Review of patient's allergies indicates:   Allergen Reactions    Plaquenil [hydroxychloroquine] Palpitations       Medications:    Current Outpatient Prescriptions:     amoxicillin (AMOXIL) 500 MG capsule, , Disp: , Rfl:     calcium-vitamin D3 (CALCIUM 500 + D) 500 mg(1,250mg) -200 unit per tablet, Take 2 tablets by mouth every other day., Disp: , Rfl:     carisoprodol (SOMA) 350 MG tablet, , Disp: , Rfl:     dronabinol (MARINOL) 2.5 MG capsule, , Disp: , Rfl:     ergocalciferol (VITAMIN D2) 50,000 unit Cap, Take  50,000 Units by mouth every 7 days., Disp: , Rfl:     hydrocodone-acetaminophen 5-325mg (NORCO) 5-325 mg per tablet, Take 1 tablet by mouth every 6 (six) hours as needed for Pain., Disp: 30 tablet, Rfl: 0    lactulose (CHRONULAC) 10 gram/15 mL solution, , Disp: , Rfl:     magnesium citrate solution, Take 296 mLs by mouth once., Disp: , Rfl:     magnesium hydroxide 400 mg/5 ml (MILK OF MAGNESIA) 400 mg/5 mL Susp, Take 30 mLs by mouth daily as needed., Disp: , Rfl:     multivitamin capsule, Take 1 capsule by mouth once daily., Disp: , Rfl:     ondansetron (ZOFRAN) 8 MG tablet, TAKE 1 TABLET BY MOUTH EVERY 8 HOURS AS NEEDED FOR NAUSEA AND VOMITING, Disp: , Rfl: 2    ondansetron (ZOFRAN-ODT) 8 MG TbDL, Take 4 mg by mouth every 6 (six) hours as needed (nausea)., Disp: , Rfl:       Objective:     Vitals:  There were no vitals taken for this visit.    Physical Examination:   GEN: chronically ill in appearance, thin  HEAD: atraumatic and normocephalic  EYES: +  pallor, + icterus, no jaundice.  ENT:  no pharyngeal erythema, external ears WNL; no nasal discharge  NECK: no masses, thyroid normal, trachea midline, no LAD/LN's, supple  CV: RRR with no murmur; normal pulse; normal S1 and S2; no pedal edema  CHEST: Normal respiratory effort; CTAB; normal breath sounds; no wheeze or crackles  ABDOM: RUQ tenderness +.  nondistended; soft; normal bowel sounds; no rebound/guarding  MUSC/Skeletal: ROM normal; no crepitus; joints normal  EXTREM: no clubbing, cyanosis, inflammation or swelling  SKIN: no rashes, lesions, ulcers, petechiae   : no valenzuela  NEURO: grossly intact; motor/sensory WNL;  no tremors  PSYCH: normal mood, affect and behavior  LYMPH: normal cervical, supraclavicular, axillary and groin LN's      Labs:    CMP, CBC, CAT ordered.      Assessment/Plan:   (1) 72 y.o. 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  Treated with  Abraxane gemzar.     (2)  Had  Y 90 10/4/17.  CAT scan 12/2017 better with necrotic mass, 6 cm at liver.  Repeat CAT now is better, mass sizes no change, but with necrosis.    (3)S/P cholecystectomy.    Recurrant icterus, RUQ pain.  Re check status of Dx.  CBC, CMP, CAT.  RTC 1 week.                  Reactions

## 2018-06-20 ENCOUNTER — TELEPHONE (OUTPATIENT)
Dept: HEMATOLOGY/ONCOLOGY | Facility: CLINIC | Age: 73
End: 2018-06-20

## 2018-06-20 ENCOUNTER — OFFICE VISIT (OUTPATIENT)
Dept: HEMATOLOGY/ONCOLOGY | Facility: CLINIC | Age: 73
End: 2018-06-20
Payer: MEDICARE

## 2018-06-20 VITALS
TEMPERATURE: 98 F | SYSTOLIC BLOOD PRESSURE: 108 MMHG | RESPIRATION RATE: 18 BRPM | HEART RATE: 82 BPM | DIASTOLIC BLOOD PRESSURE: 70 MMHG | BODY MASS INDEX: 19.71 KG/M2 | WEIGHT: 104.31 LBS

## 2018-06-20 DIAGNOSIS — C78.7 CHOLANGIOCARCINOMA METASTATIC TO LIVER: Primary | ICD-10-CM

## 2018-06-20 DIAGNOSIS — C22.1 CHOLANGIOCARCINOMA METASTATIC TO LIVER: Primary | ICD-10-CM

## 2018-06-20 PROCEDURE — 99214 OFFICE O/P EST MOD 30 MIN: CPT | Mod: ,,, | Performed by: INTERNAL MEDICINE

## 2018-06-20 NOTE — LETTER
June 20, 2018      Avinash Rogers MD  [address]  [address]  [facility]  [place] [address]           [facility] - [facility]ematology On[facility]  [address]  [address]  [place] [address]  Phone: [phone]  Fax: [phone]          Patient: Aurelia Massey   MR Number: [id number]   YOB: 1945   Date of Visit: 6/20/2018       Dear Dr. Avinash Rogers:    Thank you for referring Aurelia Massey to me for evaluation. Attached you will find relevant portions of my assessment and plan of care.    If you have questions, please do not hesitate to call me. I look forward to following Aurelia Massey along with you.    Sincerely,    SADIA Whitehead MD    Enclosure  CC:  No Recipients    If you would like to receive this communication electronically, please contact [email or web address] or [phone] to request more information on [employer] Link access.    For providers and/or their staff who would like to refer a patient to [facility], please contact us through our one-stop-shop provider referral line, [facility], at [phone].    If you feel you have received this communication in error or would no longer like to receive these types of communications, please e-mail [email or web address]

## 2018-06-26 ENCOUNTER — TELEPHONE (OUTPATIENT)
Dept: HEMATOLOGY/ONCOLOGY | Facility: CLINIC | Age: 73
End: 2018-06-26

## 2018-06-26 LAB
ALBUMIN SERPL-MCNC: 2.3 G/DL (ref 3.1–4.7)
ALP SERPL-CCNC: 336 IU/L (ref 40–104)
ALT (SGPT): 73 IU/L (ref 3–33)
AST SERPL-CCNC: 120 IU/L (ref 10–40)
BASOPHILS NFR BLD: 0.1 K/UL (ref 0–0.2)
BASOPHILS NFR BLD: 1.2 %
BILIRUB SERPL-MCNC: 12.4 MG/DL (ref 0.3–1)
BUN SERPL-MCNC: 9 MG/DL (ref 8–20)
CALCIUM SERPL-MCNC: 8.3 MG/DL (ref 7.7–10.4)
CHLORIDE: 96 MMOL/L (ref 98–110)
CO2 SERPL-SCNC: 24.6 MMOL/L (ref 22.8–31.6)
CREATININE: 0.46 MG/DL (ref 0.6–1.4)
EOSINOPHIL NFR BLD: 0.1 K/UL (ref 0–0.7)
EOSINOPHIL NFR BLD: 1.1 %
ERYTHROCYTE [DISTWIDTH] IN BLOOD BY AUTOMATED COUNT: 17.2 % (ref 11.7–14.9)
GLUCOSE: 64 MG/DL (ref 70–99)
GRAN #: 5.3 K/UL (ref 1.4–6.5)
GRAN%: 61.8 %
HCT VFR BLD AUTO: 27.5 % (ref 36–48)
HGB BLD-MCNC: 9.9 G/DL (ref 12–15)
IMMATURE GRANS (ABS): 0 K/UL (ref 0–1)
IMMATURE GRANULOCYTES: 0.5 %
ISTAT CREATININE: 0.6 MG/DL (ref 0.6–1.4)
LYMPH #: 2.3 K/UL (ref 1.2–3.4)
LYMPH%: 26.9 %
MCH RBC QN AUTO: 32.8 PG (ref 25–35)
MCHC RBC AUTO-ENTMCNC: 36 G/DL (ref 31–36)
MCV RBC AUTO: 91.1 FL (ref 79–98)
MONO #: 0.7 K/UL (ref 0.1–0.6)
MONO%: 8.5 %
NUCLEATED RBCS: 0 %
PLATELET # BLD AUTO: 349 K/UL (ref 140–440)
PMV BLD AUTO: 11.3 FL (ref 8.8–12.7)
POTASSIUM SERPL-SCNC: 4.5 MMOL/L (ref 3.5–5)
PROT SERPL-MCNC: 5.2 G/DL (ref 6–8.2)
RBC # BLD AUTO: 3.02 M/UL (ref 3.5–5.5)
SODIUM: 128 MMOL/L (ref 134–144)
WBC # BLD AUTO: 8.5 K/UL (ref 5–10)

## 2018-06-26 NOTE — TELEPHONE ENCOUNTER
Dr Adams calling to report the recent scan was done and that he can be reached at [phone].    Report printed and place for the dr to review.

## 2018-06-28 ENCOUNTER — OFFICE VISIT (OUTPATIENT)
Dept: HEMATOLOGY/ONCOLOGY | Facility: CLINIC | Age: 73
End: 2018-06-28
Payer: MEDICARE

## 2018-06-28 VITALS
WEIGHT: 102 LBS | SYSTOLIC BLOOD PRESSURE: 100 MMHG | DIASTOLIC BLOOD PRESSURE: 60 MMHG | BODY MASS INDEX: 20.56 KG/M2 | HEART RATE: 58 BPM | HEIGHT: 59 IN | TEMPERATURE: 98 F

## 2018-06-28 DIAGNOSIS — C78.7 CHOLANGIOCARCINOMA METASTATIC TO LIVER: Primary | ICD-10-CM

## 2018-06-28 DIAGNOSIS — K83.1 BILE DUCT OBSTRUCTION, INTRAHEPATIC: ICD-10-CM

## 2018-06-28 DIAGNOSIS — C22.1 CHOLANGIOCARCINOMA METASTATIC TO LIVER: Primary | ICD-10-CM

## 2018-06-28 LAB — CANCER AG19-9 SERPL-ACNC: 165 U/ML (ref 0–35)

## 2018-06-28 PROCEDURE — 99213 OFFICE O/P EST LOW 20 MIN: CPT | Mod: ,,, | Performed by: INTERNAL MEDICINE

## 2018-06-28 NOTE — LETTER
July 1, 2018      Avinash Rogers MD  [address]  [address]  [facility] [address]           [facility]  [address]  [place] [address]  Phone: [phone]  Fax: [phone]          Patient: Aurelia Massey   MR Number: [id number]   YOB: 1945   Date of Visit: 6/28/2018       Dear Dr. Avinash Rogers:    Thank you for referring Aurelia Massey to me for evaluation. Attached you will find relevant portions of my assessment and plan of care.    If you have questions, please do not hesitate to call me. I look forward to following Aurelia Massey along with you.    Sincerely,    SADIA Whitehead MD    Enclosure  CC:  No Recipients    If you would like to receive this communication electronically, please contact [email or web address] or [phone] to request more information on [employer] Link access.    For providers and/or their staff who would like to refer a patient to [facility], please contact us through our one-stop-shop provider referral line, [facility], at [phone].    If you feel you have received this communication in error or would no longer like to receive these types of communications, please e-mail [email or web address]

## 2018-06-29 ENCOUNTER — TELEPHONE (OUTPATIENT)
Dept: HEMATOLOGY/ONCOLOGY | Facility: CLINIC | Age: 73
End: 2018-06-29

## 2018-06-29 LAB
ALBUMIN SERPL-MCNC: 2.8 G/DL (ref 3.1–4.7)
ALP SERPL-CCNC: 423 IU/L (ref 40–104)
ALT (SGPT): 100 IU/L (ref 3–33)
APTT PPP: 28 SEC (ref 21.7–37.8)
AST SERPL-CCNC: 164 IU/L (ref 10–40)
BILIRUB SERPL-MCNC: 16.7 MG/DL (ref 0.3–1)
BUN SERPL-MCNC: 10 MG/DL (ref 8–20)
CALCIUM SERPL-MCNC: 8.9 MG/DL (ref 7.7–10.4)
CHLORIDE: 99 MMOL/L (ref 98–110)
CO2 SERPL-SCNC: 21.7 MMOL/L (ref 22.8–31.6)
CREATININE: 0.38 MG/DL (ref 0.6–1.4)
GLUCOSE: 81 MG/DL (ref 70–99)
HCT VFR BLD AUTO: 30.9 % (ref 36–48)
HGB BLD-MCNC: 11.4 G/DL (ref 12–15)
INR PPP: 1.1
MCH RBC QN AUTO: 33.1 PG (ref 25–35)
MCHC RBC AUTO-ENTMCNC: 36.9 G/DL (ref 31–36)
MCV RBC AUTO: 89.8 FL (ref 79–98)
NUCLEATED RBCS: 0 %
PLATELET # BLD AUTO: 256 K/UL (ref 140–440)
POTASSIUM SERPL-SCNC: 4.1 MMOL/L (ref 3.5–5)
PROT SERPL-MCNC: 6.4 G/DL (ref 6–8.2)
PROTHROMBIN TIME: 13.7 SEC (ref 11.3–15.2)
RBC # BLD AUTO: 3.44 M/UL (ref 3.5–5.5)
SODIUM: 131 MMOL/L (ref 134–144)
WBC # BLD AUTO: 9.2 K/UL (ref 5–10)

## 2018-06-29 NOTE — TELEPHONE ENCOUNTER
Received a critical of a bilirubin drawn today. It is 16.7 today up from a 12.4 on the 26th. She had a scan today showing a blockage. She is being admitted to [facility]. I called Dr. Samaniego this is his patient and he is on call today. He is aware that she is being admitted and he will see her tomorrow.6/30/18

## 2018-07-01 NOTE — PROGRESS NOTES
[facility] HEME/ONC PROGRESS NOTE      Subjective:       Patient ID:   Aurelia Massey  72 y.o. female.  1945  [place].    Chief Complaint:  Cancer follow up    History of Present Illness:     Patient called for appt.  She had temp 100.2., resolved.  No chills.  She has noticed jaundice, constipation, dark urine, light stools recently.  Some RUQ pain also.  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    She took Marinol x's 1, stopped it.    Contipation sx, she took chronulac syrup x's 1, had abdomenal cramps, she stopped it.     She saw Dr. Luther, cholecystectomy done.    Bx of liver and mapping of liver done. 9/22/17.  Had Y 90 10/4/17. Per Santosh Luther/Johnny.    CAT 12/4/17 decreased mass effect, tumor necrosis.  CAT 3/2018 better, masses no change in mass sizes, but have necrosis.  Re check CBC, CMP, CAT scan now.    ROS:   GEN: normal without any fever, night sweats or weight loss  HEENT: normal with no HA's, sore throat, stiff neck, changes in vision  CV: normal with no CP, SOB, PND, CAMPBELL or orthopnea  PULM: normal with no SOB, cough, hemoptysis, sputum or pleuritic pain  GI: See HPI.  no nausea, vomiting,  diarrhea, melanotic stools, BRBPR, or hematemesis  : normal with no hematuria, dysuria  BREAST: normal with no mass, discharge, pain  SKIN: normal with no rash, erythema, bruising, or swelling    Allergies:  Review of patient's allergies indicates:   Allergen Reactions    Plaquenil [hydroxychloroquine] Palpitations       Medications:    Current Outpatient Prescriptions:     amoxicillin (AMOXIL) 500 MG capsule, , Disp: , Rfl:     calcium-vitamin D3 (CALCIUM 500 + D) 500 mg(1,250mg) -200 unit per tablet, Take 2 tablets by mouth every other day., Disp: , Rfl:     carisoprodol (SOMA) 350 MG tablet, , Disp: , Rfl:     dronabinol (MARINOL) 2.5 MG capsule, , Disp: , Rfl:     ergocalciferol (VITAMIN  "D2) 50,000 unit Cap, Take 50,000 Units by mouth every 7 days., Disp: , Rfl:     hydrocodone-acetaminophen 5-325mg (NORCO) 5-325 mg per tablet, Take 1 tablet by mouth every 6 (six) hours as needed for Pain., Disp: 30 tablet, Rfl: 0    lactulose (CHRONULAC) 10 gram/15 mL solution, , Disp: , Rfl:     magnesium citrate solution, Take 296 mLs by mouth once., Disp: , Rfl:     magnesium hydroxide 400 mg/5 ml (MILK OF MAGNESIA) 400 mg/5 mL Susp, Take 30 mLs by mouth daily as needed., Disp: , Rfl:     multivitamin capsule, Take 1 capsule by mouth once daily., Disp: , Rfl:     ondansetron (ZOFRAN) 8 MG tablet, TAKE 1 TABLET BY MOUTH EVERY 8 HOURS AS NEEDED FOR NAUSEA AND VOMITING, Disp: , Rfl: 2    ondansetron (ZOFRAN-ODT) 8 MG TbDL, Take 4 mg by mouth every 6 (six) hours as needed (nausea)., Disp: , Rfl:       Objective:     Vitals:  Blood pressure 100/60, pulse (!) 58, temperature 97.6 °F (36.4 °C), height 4' 11" (1.499 m), weight 46.3 kg (102 lb).    Physical Examination:   GEN: chronically ill in appearance, thin  HEAD: atraumatic and normocephalic  EYES: +  pallor, + icterus, no jaundice.  ENT:  no pharyngeal erythema, external ears WNL; no nasal discharge  NECK: no masses, thyroid normal, trachea midline, no LAD/LN's, supple  CV: RRR with no murmur; normal pulse; normal S1 and S2; no pedal edema  CHEST: Normal respiratory effort; CTAB; normal breath sounds; no wheeze or crackles  ABDOM: RUQ tenderness +.  nondistended; soft; normal bowel sounds; no rebound/guarding  MUSC/Skeletal: ROM normal; no crepitus; joints normal  EXTREM: no clubbing, cyanosis, inflammation or swelling  SKIN: no rashes, lesions, ulcers, petechiae   : no valenzuela  NEURO: grossly intact; motor/sensory WNL;  no tremors  PSYCH: normal mood, affect and behavior  LYMPH: normal cervical, supraclavicular, axillary and groin LN's      Labs:    CMP, CBC, CAT showed masses blocking the ducts and bile drainage.      Assessment/Plan:   (1) 72 y.o. 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  Treated with  Abraxane gemzar.     (2) Had  Y 90 10/4/17.  CAT scan 12/2017 better with necrotic mass, 6 cm at liver.  Repeat CAT was better, mass sizes no change, but with necrosis.    (3)S/P cholecystectomy.    Recurrant icterus, RUQ pain.  Now with recurrant dx, biliary duct obstruction.  Discussed with Dr. Bynum regards stenting procedure?  He asked for MRCP with out contrast.  He will see her 7/2, place stent 7/3 if feasible.  RTC 1 week.                  Reactions

## 2018-07-03 ENCOUNTER — TELEPHONE (OUTPATIENT)
Dept: HEMATOLOGY/ONCOLOGY | Facility: CLINIC | Age: 73
End: 2018-07-03

## 2018-07-03 NOTE — TELEPHONE ENCOUNTER
Called to let the pt  know that after Dr. Whitehead review the scan he noted that the new spots were not seen in the last scan, and the old spot from before is smaller.    LM

## 2018-07-05 ENCOUNTER — TELEPHONE (OUTPATIENT)
Dept: HEMATOLOGY/ONCOLOGY | Facility: CLINIC | Age: 73
End: 2018-07-05

## 2018-07-05 ENCOUNTER — OFFICE VISIT (OUTPATIENT)
Dept: HEMATOLOGY/ONCOLOGY | Facility: CLINIC | Age: 73
End: 2018-07-05
Payer: MEDICARE

## 2018-07-05 VITALS
HEART RATE: 64 BPM | TEMPERATURE: 98 F | WEIGHT: 107 LBS | HEIGHT: 59 IN | DIASTOLIC BLOOD PRESSURE: 56 MMHG | BODY MASS INDEX: 21.57 KG/M2 | SYSTOLIC BLOOD PRESSURE: 94 MMHG

## 2018-07-05 DIAGNOSIS — C78.7 METASTATIC CANCER TO LIVER: ICD-10-CM

## 2018-07-05 DIAGNOSIS — C22.1 CHOLANGIOCARCINOMA METASTATIC TO LIVER: Primary | ICD-10-CM

## 2018-07-05 DIAGNOSIS — C78.7 CHOLANGIOCARCINOMA METASTATIC TO LIVER: Primary | ICD-10-CM

## 2018-07-05 PROCEDURE — 99214 OFFICE O/P EST MOD 30 MIN: CPT | Mod: ,,, | Performed by: INTERNAL MEDICINE

## 2018-07-05 RX ORDER — HEPARIN 100 UNIT/ML
500 SYRINGE INTRAVENOUS
Status: CANCELLED | OUTPATIENT
Start: 2018-07-27

## 2018-07-05 RX ORDER — HEPARIN 100 UNIT/ML
500 SYRINGE INTRAVENOUS
Status: CANCELLED | OUTPATIENT
Start: 2018-07-20

## 2018-07-05 RX ORDER — SODIUM CHLORIDE 0.9 % (FLUSH) 0.9 %
10 SYRINGE (ML) INJECTION
Status: CANCELLED | OUTPATIENT
Start: 2018-07-27

## 2018-07-05 RX ORDER — SODIUM CHLORIDE 0.9 % (FLUSH) 0.9 %
10 SYRINGE (ML) INJECTION
Status: CANCELLED | OUTPATIENT
Start: 2018-07-20

## 2018-07-05 RX ORDER — HEPARIN 100 UNIT/ML
500 SYRINGE INTRAVENOUS
Status: CANCELLED | OUTPATIENT
Start: 2018-07-13

## 2018-07-05 RX ORDER — SODIUM CHLORIDE 0.9 % (FLUSH) 0.9 %
10 SYRINGE (ML) INJECTION
Status: CANCELLED | OUTPATIENT
Start: 2018-07-13

## 2018-07-05 NOTE — LETTER
July 5, 2018      Avinash Rogers MD  [address]  [address]  [place] [address]           [facility]  [address]  [place] [address]  Phone: [phone]  Fax: [phone]          Patient: Aurelia Massey   MR Number: [id number]   YOB: 1945   Date of Visit: 7/5/2018       Dear Dr. Avinash Rogers:    Thank you for referring Aurelia Massey to me for evaluation. Attached you will find relevant portions of my assessment and plan of care.    If you have questions, please do not hesitate to call me. I look forward to following Aurelia Massey along with you.    Sincerely,    SADIA Whitehead MD    Enclosure  CC:  No Recipients    If you would like to receive this communication electronically, please contact [email or web address] or [phone] to request more information on [facility] access.    For providers and/or their staff who would like to refer a patient to [facility], please contact us through our one-stop-shop provider referral line, [facility], at [phone].    If you feel you have received this communication in error or would no longer like to receive these types of communications, please e-mail [email or web address]

## 2018-07-06 ENCOUNTER — TELEPHONE (OUTPATIENT)
Dept: HEMATOLOGY/ONCOLOGY | Facility: CLINIC | Age: 73
End: 2018-07-06

## 2018-07-06 LAB
ALBUMIN SERPL-MCNC: 2.2 G/DL (ref 3.1–4.7)
ALP SERPL-CCNC: 361 IU/L (ref 40–104)
ALT (SGPT): 93 IU/L (ref 3–33)
AST SERPL-CCNC: 109 IU/L (ref 10–40)
BASOPHILS NFR BLD: 0 K/UL (ref 0–0.2)
BASOPHILS NFR BLD: 0.4 %
BILIRUB SERPL-MCNC: 18.7 MG/DL (ref 0.3–1)
BUN SERPL-MCNC: 11 MG/DL (ref 8–20)
CALCIUM SERPL-MCNC: 8.8 MG/DL (ref 7.7–10.4)
CHLORIDE: 106 MMOL/L (ref 98–110)
CO2 SERPL-SCNC: 24.1 MMOL/L (ref 22.8–31.6)
CREATININE: 0.4 MG/DL (ref 0.6–1.4)
EOSINOPHIL NFR BLD: 0.1 K/UL (ref 0–0.7)
EOSINOPHIL NFR BLD: 1.1 %
ERYTHROCYTE [DISTWIDTH] IN BLOOD BY AUTOMATED COUNT: 21.4 % (ref 12.5–14.5)
GLUCOSE: 82 MG/DL (ref 70–99)
GRAN #: 6.5 K/UL (ref 1.4–6.5)
GRAN%: 69.9 %
HCT VFR BLD AUTO: 33.2 % (ref 36–48)
HGB BLD-MCNC: 12.2 G/DL (ref 12–15)
IMMATURE GRANS (ABS): 0.1 K/UL (ref 0–1)
IMMATURE GRANULOCYTES: 0.5 %
LYMPH #: 1.7 K/UL (ref 1.2–3.4)
LYMPH%: 18.6 %
MCH RBC QN AUTO: 33.3 PG (ref 25–35)
MCHC RBC AUTO-ENTMCNC: 36.7 G/DL (ref 31–36)
MCV RBC AUTO: 90.7 FL (ref 79–98)
MONO #: 0.9 K/UL (ref 0.1–0.6)
MONO%: 9.5 %
NUCLEATED RBCS: 0 %
NUCLEATED RED BLOOD CELLS: 0 /100 WBC
PERFORMED BY:: ABNORMAL
PLATELET # BLD AUTO: 318 K/UL (ref 140–440)
PMV BLD AUTO: 10.6 FL (ref 8.8–12.7)
POTASSIUM SERPL-SCNC: 3.9 MMOL/L (ref 3.5–5)
PROT SERPL-MCNC: 5.8 G/DL (ref 6–8.2)
RBC # BLD AUTO: 3.66 M/UL (ref 3.5–5.5)
SODIUM: 137 MMOL/L (ref 134–144)
WBC # BLD: 9.3 K/UL (ref 5–10)

## 2018-07-06 NOTE — PROGRESS NOTES
[facility] HEME/ONC PROGRESS NOTE      Subjective:       Patient ID:   Aurelia Massey  72 y.o. female.  1945  [place].    Chief Complaint:  Cancer follow up    History of Present Illness:     Patient called for appt.  She had temp 100.2., resolved.  No chills.  She has noticed jaundice, constipation, dark urine, light stools recently.  Some RUQ pain also.  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    She took Marinol x's 1, stopped it.    Contipation sx, she took chronulac syrup x's 1, had abdomenal cramps, she stopped it.     She saw Dr. Luther, cholecystectomy done.    Bx of liver and mapping of liver done. 9/22/17.  Had Y 90 10/4/17. Per Santosh Luther/Johnny.    CAT 12/4/17 decreased mass effect, tumor necrosis.  CAT 3/2018 better, masses no change in mass sizes, but have necrosis.    Now with jaundice, CAT and MRCP showed mass obstructing the biliary ducts.  Dr. Bynum did placement of biliary stent. T. Bili.26, re check CMP for any trend.    ROS:   GEN: normal without any fever, night sweats or weight loss  HEENT: normal with no HA's, sore throat, stiff neck, changes in vision  CV: normal with no CP, SOB, PND, CAMPBELL or orthopnea  PULM: normal with no SOB, cough, hemoptysis, sputum or pleuritic pain  GI: See HPI.  no nausea, vomiting,  diarrhea, melanotic stools, BRBPR, or hematemesis  : normal with no hematuria, dysuria  BREAST: normal with no mass, discharge, pain  SKIN: normal with no rash, erythema, bruising, or swelling    Allergies:  Review of patient's allergies indicates:   Allergen Reactions    Plaquenil [hydroxychloroquine] Palpitations       Medications:    Current Outpatient Prescriptions:     amoxicillin (AMOXIL) 500 MG capsule, , Disp: , Rfl:     calcium-vitamin D3 (CALCIUM 500 + D) 500 mg(1,250mg) -200 unit per tablet, Take 2 tablets by mouth every other day., Disp: , Rfl:      "carisoprodol (SOMA) 350 MG tablet, , Disp: , Rfl:     dronabinol (MARINOL) 2.5 MG capsule, , Disp: , Rfl:     ergocalciferol (VITAMIN D2) 50,000 unit Cap, Take 50,000 Units by mouth every 7 days., Disp: , Rfl:     hydrocodone-acetaminophen 5-325mg (NORCO) 5-325 mg per tablet, Take 1 tablet by mouth every 6 (six) hours as needed for Pain., Disp: 30 tablet, Rfl: 0    lactulose (CHRONULAC) 10 gram/15 mL solution, , Disp: , Rfl:     magnesium citrate solution, Take 296 mLs by mouth once., Disp: , Rfl:     magnesium hydroxide 400 mg/5 ml (MILK OF MAGNESIA) 400 mg/5 mL Susp, Take 30 mLs by mouth daily as needed., Disp: , Rfl:     multivitamin capsule, Take 1 capsule by mouth once daily., Disp: , Rfl:     ondansetron (ZOFRAN) 8 MG tablet, TAKE 1 TABLET BY MOUTH EVERY 8 HOURS AS NEEDED FOR NAUSEA AND VOMITING, Disp: , Rfl: 2    ondansetron (ZOFRAN-ODT) 8 MG TbDL, Take 4 mg by mouth every 6 (six) hours as needed (nausea)., Disp: , Rfl:       Objective:     Vitals:  Blood pressure (!) 94/56, pulse 64, temperature 98.3 °F (36.8 °C), height 4' 11" (1.499 m), weight 48.5 kg (107 lb).    Physical Examination:   GEN: chronically ill in appearance, thin  HEAD: atraumatic and normocephalic  EYES: +  pallor, + icterus, jaundice noted.  ENT:  no pharyngeal erythema, external ears WNL; no nasal discharge  NECK: no masses, thyroid normal, trachea midline, no LAD/LN's, supple  CV: RRR with no murmur; normal pulse; normal S1 and S2; no pedal edema  CHEST: Normal respiratory effort; CTAB; normal breath sounds; no wheeze or crackles  ABDOM: RUQ tenderness +.  nondistended; soft; normal bowel sounds; no rebound/guarding  MUSC/Skeletal: ROM normal; no crepitus; joints normal  EXTREM: no clubbing, cyanosis, inflammation or swelling  SKIN: no rashes, lesions, ulcers, petechiae   : no valenzuela  NEURO: grossly intact; motor/sensory WNL;  no tremors  PSYCH: normal mood, affect and behavior  LYMPH: normal cervical, supraclavicular, axillary " and groin LN's      Labs:    CMP, CBC, CAT showed masses blocking the ducts and bile drainage.  Confirmed with MRCP.  T. Bili 26.      Assessment/Plan:   (1) 72 y.o. 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  Treated with  Abraxane gemzar.     (2) Had  Y 90 10/4/17.  CAT scan 12/2017 better with necrotic mass, 6 cm at liver.  Repeat CAT was better, mass sizes no change, but with necrosis.    (3)S/P cholecystectomy.    Recurrant icterus, RUQ pain.  Now with recurrant dx, biliary duct obstruction.  Discussed with Dr. Bynum regards stenting procedure?  He asked for MRCP with out contrast.  He  Placed stent 7/3, re check CMP now.    To resume Abraxane, Gemzar next week.  Check on  Y90 feasability.  RTC 1 month.                  Reactions

## 2018-07-06 NOTE — TELEPHONE ENCOUNTER
I scheduled the pt chemo to start 07/13/2018 @11:00am. She will see dr. michelle on 07/12/2018 @ 10:30 am. I will speak with Love about chemo school. The pt is coming in today and the 9th to the infusion center to do blood work. It may be easier if chemo school is done on the 9th while they are here for blood work.

## 2018-07-06 NOTE — TELEPHONE ENCOUNTER
I am starting her back on gemzar,abraxine 7/120r 7/13  Get Date and time for pt.  Chemo school  Get auth for her chemo

## 2018-07-06 NOTE — TELEPHONE ENCOUNTER
I spoke with Love and she said that pt was not aware of labs for today. I called Mrs. Moses and explained that we need her to come in today to do CBC and CMP. She will then repeat her CMP on Monday 07/09/2018 when she is here for chemo school with Love. Pt stated that she now understands the plan we have laid out for her.

## 2018-07-09 ENCOUNTER — TELEPHONE (OUTPATIENT)
Dept: HEMATOLOGY/ONCOLOGY | Facility: CLINIC | Age: 73
End: 2018-07-09

## 2018-07-09 NOTE — TELEPHONE ENCOUNTER
Spoke with patient regarding her not showing up for her chemotherapy school. Patient states that she is too weak to start chemotherapy at this time.   Patient states that she has been constipated and has taken magnesium citrate, milk of magnesium, and is now on miralax per Dr. Bynum.  She states that Dr. Bynum has been monitoring her constipation and that she has to call him back today if she does not have a bowel movement.  Advised Dr. Samaniego of patients cancellation of chemotherapy class.

## 2019-01-23 NOTE — TELEPHONE ENCOUNTER
----- Message from Brenda Gil sent at 7/24/2017  9:51 AM CDT -----  Patient called in and said that Dr. Luther wants her to cancel any treatments she has until he has finished with her. She is on the schedule for tomorrow next door. Please advise and call the patient. Thanks    Dr. Mendez,   Pt was seen yesterday and colonoscopy was ordered for her. She called stating that she can't go 5 days or even 3 days without her NSAIDS. Does the pt need to stop these meds or is it safe for her to continue them? Please advise. Thank you.

## 2023-07-19 NOTE — PLAN OF CARE
02/01/17 1019   Discharge Assessment   Assessment Type Discharge Planning Assessment   Confirmed/corrected address and phone number on facesheet? Yes   Assessment information obtained from? Patient;Other   Expected Length of Stay (days) 4   Communicated expected length of stay with patient/caregiver yes   If Healthcare Directive is received, is it scanned into Epic? no (comment)   Prior to hospitilization cognitive status: Alert/Oriented   Prior to hospitalization functional status: Independent   Current cognitive status: Alert/Oriented   Current Functional Status: Independent   Arrived From home or self-care   Lives With spouse   Able to Return to Prior Arrangements yes   Is patient able to care for self after discharge? Yes   Patient's perception of discharge disposition home or selfcare   Readmission Within The Last 30 Days no previous admission in last 30 days   Patient currently being followed by outpatient case management? No   Patient currently receives home health services? No   Does the patient currently use HME? No   Patient currently receives private duty nursing? No   Patient currently receives any other outside agency services? No   Equipment Currently Used at Home none   Do you have any problems affording any of your prescribed medications? No   Is the patient taking medications as prescribed? no   Do you have any financial concerns preventing you from receiving the healthcare you need? No   Does the patient have transportation to healthcare appointments? Yes   Transportation Available car   On Dialysis? No   Does the patient receive services at the [facility]mad[facility] Clinic? No   Are there any open cases? No   Discharge Plan A Home   Patient/Family In Agreement With Plan yes     Met with Pt., spouse Iban Massey at the bed side.  Confrimed information on the face sheet.  Correct home phone # [phone] will correct in demographics.  Pt's last admit was 12/28.  Pt. Did f/u with Dr. Whitehead,  chemo was  this past Monday.  Pt's preferred pharmacy is [facility] in [place].  Anticipate Pt will return home at d/c.  My name and number is on the white board, will f/u with any needs.      62

## (undated) DEVICE — DRAPE C-ARM FOR MOBILE XRAY

## (undated) DEVICE — GLOVE SURG BIOGEL LATEX SZ 7.5

## (undated) DEVICE — GUN BIOPSY 18GA MONOPLY

## (undated) DEVICE — SEE MEDLINE ITEM 146372

## (undated) DEVICE — ADHESIVE SURG LIQ 2 OZ

## (undated) DEVICE — ELECTRODE REM PLYHSV RETURN 9

## (undated) DEVICE — GAUZE SPONGE BULKEE 6X6.75IN

## (undated) DEVICE — SUT MONOCRYL 4-0 SH UND MON

## (undated) DEVICE — SEE MEDLINE ITEM 157117

## (undated) DEVICE — TROCAR ENDOPATH EXCEL DILATING

## (undated) DEVICE — MANIFOLD 4 PORT

## (undated) DEVICE — CLOSURE SKIN STERI STRIP 1/2X4

## (undated) DEVICE — APPLICATOR CHLORAPREP ORN 26ML

## (undated) DEVICE — DRAPE LAP TIBURON 77X122IN

## (undated) DEVICE — SEE MEDLINE ITEM 152622

## (undated) DEVICE — TROCAR ENDOPATH EXCEL

## (undated) DEVICE — DRAPE INCISE IOBAN 2 23X23IN

## (undated) DEVICE — SOL IRR NACL .9% 3000ML

## (undated) DEVICE — SUT 2-0 VICRYL / SH (J417)

## (undated) DEVICE — NDL SAFETY 25G X 1.5 ECLIPSE

## (undated) DEVICE — DISSECTOR CURVED 5DCD

## (undated) DEVICE — SLEEVE SCD EXPRESS CALF MEDIUM

## (undated) DEVICE — DRAPE ABDOMINAL TIBURON 14X11

## (undated) DEVICE — SEE MEDLINE ITEM 157116

## (undated) DEVICE — SYR 10CC LUER LOCK

## (undated) DEVICE — BLADE SURG CARBON STEEL SZ11

## (undated) DEVICE — SOL 9P NACL IRR PIC IL

## (undated) DEVICE — DRAPE STERI INSTRUMENT 1018

## (undated) DEVICE — TROCAR ENDOPATH XCEL 12X100MM

## (undated) DEVICE — PACK BASIC

## (undated) DEVICE — COVER OVERHEAD SURG LT BLUE

## (undated) DEVICE — KIT ANTIFOG

## (undated) DEVICE — NDL HYPO REG 25G X 1 1/2

## (undated) DEVICE — CONTAINER SPECIMEN STRL 4OZ

## (undated) DEVICE — SUT 0 VICRYL / UR6 (J603)

## (undated) DEVICE — SWABSTICK BENZOIN 4 IN

## (undated) DEVICE — PACK CUSTOM UNIV BASIN SLI

## (undated) DEVICE — SYR DISP LL 5CC

## (undated) DEVICE — DRESSING TRANS 4X4 TEGADERM

## (undated) DEVICE — TUBING INSUFFLATION 10

## (undated) DEVICE — IRRIGATOR ENDOSCOPY DISP.

## (undated) DEVICE — SUT 3-0 VICRYL / SH (J416)

## (undated) DEVICE — SEE MEDLINE ITEM 156955

## (undated) DEVICE — GLOVE SURG ULTRA TOUCH 8

## (undated) DEVICE — ADHESIVE DERMABOND ADVANCED

## (undated) DEVICE — LINER SUCTION 3000CC

## (undated) DEVICE — APPLIER CLIP ENDO MED/LG 10MM

## (undated) DEVICE — SEE MEDLINE ITEM 146292

## (undated) DEVICE — BAG TISSUE RETRIEVAL 225ML

## (undated) DEVICE — STRAP OR TABLE 5IN X 72IN

## (undated) DEVICE — TROCAR ENDOPATH XCEL 5X75MM

## (undated) DEVICE — DRESSING TELFA N ADH 3X8

## (undated) DEVICE — TROCAR RESPOSABLE 5MM X 100MM

## (undated) DEVICE — SUT MONOCRYL 3-0 PS-2 UND